# Patient Record
Sex: FEMALE | Race: BLACK OR AFRICAN AMERICAN | Employment: UNEMPLOYED | URBAN - METROPOLITAN AREA
[De-identification: names, ages, dates, MRNs, and addresses within clinical notes are randomized per-mention and may not be internally consistent; named-entity substitution may affect disease eponyms.]

---

## 2021-12-07 PROBLEM — R11.0 NAUSEA: Status: ACTIVE | Noted: 2021-12-07

## 2021-12-07 PROBLEM — E86.0 DEHYDRATION: Status: ACTIVE | Noted: 2021-12-07

## 2021-12-07 PROBLEM — F41.1 GENERALIZED ANXIETY DISORDER: Status: ACTIVE | Noted: 2021-12-07

## 2021-12-07 PROBLEM — I10 ESSENTIAL HYPERTENSION: Status: ACTIVE | Noted: 2021-12-07

## 2021-12-09 PROBLEM — K04.7 DENTAL ABSCESS: Status: ACTIVE | Noted: 2021-12-09

## 2021-12-09 PROBLEM — I10 ASYMPTOMATIC HYPERTENSION: Status: RESOLVED | Noted: 2021-12-07 | Resolved: 2021-12-09

## 2021-12-09 PROBLEM — F41.9 ANXIETY: Status: ACTIVE | Noted: 2021-12-09

## 2021-12-09 PROBLEM — I10 PRIMARY HYPERTENSION: Status: ACTIVE | Noted: 2021-12-09

## 2021-12-09 PROBLEM — M06.9 RHEUMATOID ARTHRITIS (HCC): Status: RESOLVED | Noted: 2021-12-09 | Resolved: 2021-12-09

## 2021-12-09 PROBLEM — F41.1 GENERALIZED ANXIETY DISORDER: Status: RESOLVED | Noted: 2021-12-07 | Resolved: 2021-12-09

## 2021-12-09 PROBLEM — M79.10 MYALGIA: Status: ACTIVE | Noted: 2021-12-09

## 2021-12-09 PROBLEM — I10 ACCELERATED HYPERTENSION: Status: ACTIVE | Noted: 2021-12-09

## 2021-12-09 PROBLEM — K02.9 PAIN DUE TO DENTAL CARIES: Status: RESOLVED | Noted: 2021-12-09 | Resolved: 2021-12-09

## 2021-12-09 PROBLEM — R55 SYNCOPE: Status: RESOLVED | Noted: 2021-12-09 | Resolved: 2021-12-09

## 2021-12-09 PROBLEM — K08.89 PAIN, DENTAL: Status: ACTIVE | Noted: 2021-12-09

## 2021-12-09 PROBLEM — K50.90 CROHN'S DISEASE (HCC): Status: ACTIVE | Noted: 2021-12-09

## 2021-12-09 PROBLEM — S99.911A RIGHT ANKLE INJURY: Status: ACTIVE | Noted: 2021-12-09

## 2021-12-09 PROBLEM — S89.90XA KNEE INJURIES: Status: ACTIVE | Noted: 2021-12-09

## 2021-12-09 PROBLEM — M32.9 EXACERBATION OF SYSTEMIC LUPUS (HCC): Status: ACTIVE | Noted: 2021-12-09

## 2021-12-09 PROBLEM — K08.89 PAIN, DENTAL: Status: RESOLVED | Noted: 2021-12-09 | Resolved: 2021-12-09

## 2021-12-09 PROBLEM — R10.9 ABDOMINAL PAIN: Status: RESOLVED | Noted: 2021-12-09 | Resolved: 2021-12-09

## 2021-12-09 PROBLEM — R11.0 NAUSEA: Status: RESOLVED | Noted: 2021-12-07 | Resolved: 2021-12-09

## 2021-12-09 PROBLEM — F32.A DEPRESSIVE DISORDER: Status: ACTIVE | Noted: 2021-12-09

## 2021-12-09 PROBLEM — M06.9 RHEUMATOID ARTHRITIS (HCC): Status: ACTIVE | Noted: 2021-12-09

## 2021-12-09 PROBLEM — R10.9 ABDOMINAL PAIN: Status: ACTIVE | Noted: 2021-12-09

## 2021-12-09 PROBLEM — E86.0 DEHYDRATION: Status: RESOLVED | Noted: 2021-12-07 | Resolved: 2021-12-09

## 2021-12-09 PROBLEM — R58 ECCHYMOSIS: Status: ACTIVE | Noted: 2021-12-09

## 2021-12-09 PROBLEM — I10 ASYMPTOMATIC HYPERTENSION: Status: ACTIVE | Noted: 2021-12-07

## 2021-12-09 PROBLEM — M25.571 RIGHT ANKLE PAIN: Status: ACTIVE | Noted: 2021-12-09

## 2021-12-09 PROBLEM — F32.A DEPRESSIVE DISORDER: Status: RESOLVED | Noted: 2021-12-09 | Resolved: 2021-12-09

## 2021-12-09 PROBLEM — R55 SYNCOPE: Status: ACTIVE | Noted: 2021-12-09

## 2021-12-09 PROBLEM — M79.10 MYALGIA: Status: RESOLVED | Noted: 2021-12-09 | Resolved: 2021-12-09

## 2021-12-09 PROBLEM — M32.9 EXACERBATION OF SYSTEMIC LUPUS (HCC): Status: RESOLVED | Noted: 2021-12-09 | Resolved: 2021-12-09

## 2021-12-09 PROBLEM — S99.911A RIGHT ANKLE INJURY: Status: RESOLVED | Noted: 2021-12-09 | Resolved: 2021-12-09

## 2021-12-09 PROBLEM — K04.7 DENTAL ABSCESS: Status: RESOLVED | Noted: 2021-12-09 | Resolved: 2021-12-09

## 2021-12-09 PROBLEM — R58 ECCHYMOSIS: Status: RESOLVED | Noted: 2021-12-09 | Resolved: 2021-12-09

## 2021-12-09 PROBLEM — M25.571 RIGHT ANKLE PAIN: Status: RESOLVED | Noted: 2021-12-09 | Resolved: 2021-12-09

## 2021-12-09 PROBLEM — S89.90XA KNEE INJURIES: Status: RESOLVED | Noted: 2021-12-09 | Resolved: 2021-12-09

## 2021-12-09 PROBLEM — K02.9 PAIN DUE TO DENTAL CARIES: Status: ACTIVE | Noted: 2021-12-09

## 2021-12-09 PROBLEM — I10 ACCELERATED HYPERTENSION: Status: RESOLVED | Noted: 2021-12-09 | Resolved: 2021-12-09

## 2021-12-16 ENCOUNTER — TELEMEDICINE (OUTPATIENT)
Dept: TELEHEALTH | Age: 36
End: 2021-12-16
Payer: MEDICAID

## 2021-12-16 DIAGNOSIS — Z02.9 ADMINISTRATIVE ENCOUNTER: Primary | ICD-10-CM

## 2021-12-16 PROCEDURE — 99499 UNLISTED E&M SERVICE: CPT | Performed by: NURSE PRACTITIONER

## 2021-12-16 NOTE — PROGRESS NOTES
Well nourished 42 yo female with complaint of flair of carpel tunnel syndrome.   Relates that she was prescribed a \"U\" medicine in the past.  After discussion it was determined to be Ultram. Inform patient that this is a narcotic and beyond the scope of p

## 2021-12-16 NOTE — PROGRESS NOTES
Well nourished 42 yo female with complaint of flair of carpel tunnel syndrome.   She relates that she has a flair in the past and treated with Ultram.  Instructed that this practitioner can not prescribe this medication (narcotic) and that that may be presc

## 2022-01-19 ENCOUNTER — TELEMEDICINE (OUTPATIENT)
Dept: TELEHEALTH | Age: 37
End: 2022-01-19
Payer: MEDICAID

## 2022-01-19 ENCOUNTER — TELEMEDICINE (OUTPATIENT)
Dept: TELEHEALTH | Age: 37
End: 2022-01-19

## 2022-01-19 DIAGNOSIS — Z02.9 ADMINISTRATIVE ENCOUNTER: Primary | ICD-10-CM

## 2022-01-19 PROCEDURE — 99499 UNLISTED E&M SERVICE: CPT | Performed by: NURSE PRACTITIONER

## 2022-01-19 NOTE — PROGRESS NOTES
Patient elected a video visit. She relates that her \"carpel tunnel syndrome \" pain has flared and request pain medication. Informed that this venue can not prescribe opioid medication of any kind.   Instructed to seek assistance with an Immediate Care o

## 2022-01-19 NOTE — PROGRESS NOTES
Review of medical record revealed patient sought care at [facility] [place] in [place].

## 2022-01-20 ENCOUNTER — TELEMEDICINE (OUTPATIENT)
Dept: TELEHEALTH | Age: 37
End: 2022-01-20

## 2022-01-20 DIAGNOSIS — Z02.9 ADMINISTRATIVE ENCOUNTER: Primary | ICD-10-CM

## 2022-01-20 RX ORDER — HYDROXYZINE HYDROCHLORIDE 25 MG/1
TABLET, FILM COATED ORAL
COMMUNITY
Start: 2020-08-31

## 2022-01-20 RX ORDER — ACETAMINOPHEN AND CODEINE PHOSPHATE 300; 30 MG/1; MG/1
TABLET ORAL
COMMUNITY
Start: 2020-09-20 | End: 2022-01-20

## 2022-01-20 RX ORDER — ALPRAZOLAM 0.5 MG/1
TABLET ORAL
COMMUNITY
Start: 2021-10-08

## 2022-01-20 RX ORDER — CHLORHEXIDINE GLUCONATE 0.12 MG/ML
1 RINSE ORAL 2 TIMES DAILY
COMMUNITY
Start: 2022-01-19

## 2022-01-20 RX ORDER — AMLODIPINE BESYLATE 10 MG/1
TABLET ORAL
COMMUNITY
Start: 2021-12-01

## 2022-01-20 RX ORDER — HYDROCODONE BITARTRATE AND ACETAMINOPHEN 5; 325 MG/1; MG/1
TABLET ORAL
COMMUNITY
Start: 2021-06-27 | End: 2022-01-20

## 2022-01-20 RX ORDER — DICYCLOMINE HCL 20 MG
1 TABLET ORAL 2 TIMES DAILY
COMMUNITY
Start: 2021-12-12

## 2022-01-20 RX ORDER — PENICILLIN V POTASSIUM 500 MG/1
500 TABLET ORAL 4 TIMES DAILY
COMMUNITY

## 2022-01-20 RX ORDER — TRAZODONE HYDROCHLORIDE 100 MG/1
TABLET ORAL
COMMUNITY
Start: 2021-12-01

## 2022-01-20 RX ORDER — PROPRANOLOL HYDROCHLORIDE 20 MG/1
1 TABLET ORAL 2 TIMES DAILY
COMMUNITY
Start: 2021-12-01

## 2022-01-20 RX ORDER — HYDRALAZINE HYDROCHLORIDE 50 MG/1
1 TABLET, FILM COATED ORAL 3 TIMES DAILY
COMMUNITY
Start: 2020-09-16

## 2022-01-20 RX ORDER — LORAZEPAM 0.5 MG/1
0.25 TABLET ORAL
COMMUNITY
Start: 2020-09-20

## 2022-01-20 NOTE — PROGRESS NOTES
Patient initiated Video Visit yesterday for pain. Reports still having significant pain today. Went to [facility] ED and was dx with severe dental abscess and started on PenVK, mouth rinse, and is taking acetaminophen. Still having significant pain.      A

## 2022-03-11 ENCOUNTER — TELEMEDICINE (OUTPATIENT)
Dept: TELEHEALTH | Age: 37
End: 2022-03-11

## 2022-03-11 DIAGNOSIS — F41.9 ANXIETY: ICD-10-CM

## 2022-03-11 DIAGNOSIS — K08.89 TOOTH PAIN: Primary | ICD-10-CM

## 2022-03-11 PROCEDURE — 99212 OFFICE O/P EST SF 10 MIN: CPT | Performed by: PHYSICIAN ASSISTANT

## 2022-03-19 ENCOUNTER — TELEMEDICINE (OUTPATIENT)
Dept: TELEHEALTH | Age: 37
End: 2022-03-19

## 2022-03-19 DIAGNOSIS — Z02.9 ADMINISTRATIVE ENCOUNTER: Primary | ICD-10-CM

## 2022-03-21 ENCOUNTER — TELEMEDICINE (OUTPATIENT)
Dept: TELEHEALTH | Age: 37
End: 2022-03-21

## 2022-03-21 DIAGNOSIS — Z02.9 ENCOUNTERS FOR ADMINISTRATIVE PURPOSE: ICD-10-CM

## 2022-03-21 DIAGNOSIS — R07.9 CHEST PAIN, UNSPECIFIED TYPE: Primary | ICD-10-CM

## 2022-04-28 ENCOUNTER — TELEMEDICINE (OUTPATIENT)
Dept: TELEHEALTH | Age: 37
End: 2022-04-28

## 2022-04-28 VITALS — HEIGHT: 62 IN | WEIGHT: 190 LBS | BODY MASS INDEX: 34.96 KG/M2

## 2022-04-28 DIAGNOSIS — L28.2 PRURITIC RASH: ICD-10-CM

## 2022-04-28 DIAGNOSIS — L29.9 ITCHING: Primary | ICD-10-CM

## 2022-04-28 PROCEDURE — 99212 OFFICE O/P EST SF 10 MIN: CPT | Performed by: NURSE PRACTITIONER

## 2022-04-28 RX ORDER — HYDROXYZINE HYDROCHLORIDE 25 MG/1
25 TABLET, FILM COATED ORAL EVERY 8 HOURS PRN
Qty: 15 TABLET | Refills: 0 | Status: SHIPPED | OUTPATIENT
Start: 2022-04-28 | End: 2022-05-03

## 2022-04-28 RX ORDER — LOSARTAN POTASSIUM 50 MG/1
100 TABLET ORAL
COMMUNITY
Start: 2022-03-22

## 2022-04-28 RX ORDER — PREDNISONE 20 MG/1
40 TABLET ORAL DAILY
Qty: 10 TABLET | Refills: 0 | Status: SHIPPED | OUTPATIENT
Start: 2022-04-28 | End: 2022-05-03

## 2022-05-04 ENCOUNTER — TELEMEDICINE (OUTPATIENT)
Dept: TELEHEALTH | Age: 37
End: 2022-05-04

## 2022-05-04 DIAGNOSIS — F41.9 ANXIETY: ICD-10-CM

## 2022-05-04 DIAGNOSIS — M54.42 ACUTE MIDLINE LOW BACK PAIN WITH LEFT-SIDED SCIATICA: Primary | ICD-10-CM

## 2022-05-04 DIAGNOSIS — S06.0X0D CONCUSSION WITHOUT LOSS OF CONSCIOUSNESS, SUBSEQUENT ENCOUNTER: ICD-10-CM

## 2022-05-04 PROCEDURE — 99214 OFFICE O/P EST MOD 30 MIN: CPT | Performed by: NURSE PRACTITIONER

## 2022-05-04 RX ORDER — TRAZODONE HYDROCHLORIDE 100 MG/1
100 TABLET ORAL NIGHTLY
Qty: 30 TABLET | Refills: 0 | Status: SHIPPED | OUTPATIENT
Start: 2022-05-04

## 2022-05-04 RX ORDER — METHYLPREDNISOLONE 4 MG/1
TABLET ORAL
Qty: 1 EACH | Refills: 0 | Status: SHIPPED | OUTPATIENT
Start: 2022-05-04

## 2022-05-13 ENCOUNTER — TELEMEDICINE (OUTPATIENT)
Dept: TELEHEALTH | Age: 37
End: 2022-05-13

## 2022-05-13 DIAGNOSIS — L50.9 URTICARIA: Primary | ICD-10-CM

## 2022-05-13 PROCEDURE — 99213 OFFICE O/P EST LOW 20 MIN: CPT | Performed by: NURSE PRACTITIONER

## 2022-05-13 RX ORDER — PREDNISONE 20 MG/1
TABLET ORAL
Qty: 10 TABLET | Refills: 0 | Status: SHIPPED | OUTPATIENT
Start: 2022-05-13

## 2022-05-13 RX ORDER — HYDROXYZINE 50 MG/1
TABLET, FILM COATED ORAL
Qty: 10 TABLET | Refills: 0 | Status: SHIPPED | OUTPATIENT
Start: 2022-05-13

## 2022-06-03 ENCOUNTER — TELEMEDICINE (OUTPATIENT)
Dept: TELEHEALTH | Age: 37
End: 2022-06-03

## 2022-06-03 DIAGNOSIS — Z02.9 ADMINISTRATIVE ENCOUNTER: Primary | ICD-10-CM

## 2022-06-03 NOTE — PROGRESS NOTES
Patient has had recurrent telehealth visits for urticaria and itching that only happens when she gets home. Think she is allergic to something in her house. No rash at this time. No breathing difficulty or swelling to face or angioedema noted on video visit. Discussed that this is not appropriate management of condition and needs to be seen in person by PCP for chronic urticaria. Referred to health. org to establish PCP if unable to make prompt appointment with Dr. Roro Hazel. Video visit cancelled, no charge.

## 2023-04-15 ENCOUNTER — HOSPITAL ENCOUNTER (EMERGENCY)
Age: 38
Discharge: HOME | End: 2023-04-15
Attending: EMERGENCY MEDICINE
Payer: MEDICAID

## 2023-04-15 VITALS
OXYGEN SATURATION: 98 % | WEIGHT: 180 LBS | SYSTOLIC BLOOD PRESSURE: 171 MMHG | HEIGHT: 62 IN | BODY MASS INDEX: 33.13 KG/M2 | DIASTOLIC BLOOD PRESSURE: 125 MMHG | HEART RATE: 97 BPM | RESPIRATION RATE: 18 BRPM | TEMPERATURE: 98 F

## 2023-04-15 DIAGNOSIS — Z76.0 ENCOUNTER FOR MEDICATION REFILL: ICD-10-CM

## 2023-04-15 DIAGNOSIS — R03.0 ELEVATED BLOOD PRESSURE READING: ICD-10-CM

## 2023-04-15 DIAGNOSIS — K08.89 PAIN, DENTAL: Primary | ICD-10-CM

## 2023-04-15 PROCEDURE — 99285 EMERGENCY DEPT VISIT HI MDM: CPT

## 2023-04-15 PROCEDURE — 64400 NJX AA&/STRD TRIGEMINAL NRV: CPT

## 2023-04-15 PROCEDURE — 99283 EMERGENCY DEPT VISIT LOW MDM: CPT

## 2023-04-15 PROCEDURE — 99284 EMERGENCY DEPT VISIT MOD MDM: CPT

## 2023-04-15 RX ORDER — ACETAMINOPHEN AND CODEINE PHOSPHATE 300; 30 MG/1; MG/1
1-2 TABLET ORAL EVERY 6 HOURS PRN
Qty: 10 TABLET | Refills: 0 | Status: SHIPPED | OUTPATIENT
Start: 2023-04-15 | End: 2023-04-20

## 2023-04-15 RX ORDER — GABAPENTIN 100 MG/1
100 CAPSULE ORAL 3 TIMES DAILY
Qty: 21 CAPSULE | Refills: 0 | Status: SHIPPED | OUTPATIENT
Start: 2023-04-15

## 2023-04-15 RX ORDER — ACETAMINOPHEN AND CODEINE PHOSPHATE 300; 30 MG/1; MG/1
1-2 TABLET ORAL EVERY 6 HOURS PRN
Qty: 10 TABLET | Refills: 0 | Status: SHIPPED | OUTPATIENT
Start: 2023-04-15 | End: 2023-04-15

## 2023-04-15 RX ORDER — HYDRALAZINE HYDROCHLORIDE 50 MG/1
50 TABLET, FILM COATED ORAL 3 TIMES DAILY
Qty: 30 TABLET | Refills: 0 | Status: SHIPPED | OUTPATIENT
Start: 2023-04-15

## 2023-04-15 RX ORDER — BUPIVACAINE HYDROCHLORIDE 5 MG/ML
10 INJECTION, SOLUTION EPIDURAL; INTRACAUDAL ONCE
Status: COMPLETED | OUTPATIENT
Start: 2023-04-15 | End: 2023-04-15

## 2023-04-15 RX ORDER — AMLODIPINE BESYLATE 10 MG/1
10 TABLET ORAL DAILY
Qty: 30 TABLET | Refills: 0 | Status: SHIPPED | OUTPATIENT
Start: 2023-04-15

## 2023-04-15 NOTE — DISCHARGE INSTRUCTIONS
Please follow-up with your primary care physician 1-2 days return to the ER if your symptoms worsen progress or if you have any further concerns. Please take Motrin 600 mg every 6 hours as needed for pain control. Please touch your primary care physician about your elevated blood pressure. It was elevated to 197/125 here which may be related to how much pain urine but it should be rechecked within 1 week.

## 2023-06-21 ENCOUNTER — HOSPITAL ENCOUNTER (EMERGENCY)
Age: 38
Discharge: HOME | End: 2023-06-21
Attending: EMERGENCY MEDICINE
Payer: MEDICAID

## 2023-06-21 VITALS
BODY MASS INDEX: 33.13 KG/M2 | SYSTOLIC BLOOD PRESSURE: 151 MMHG | WEIGHT: 180 LBS | HEART RATE: 85 BPM | RESPIRATION RATE: 18 BRPM | OXYGEN SATURATION: 99 % | DIASTOLIC BLOOD PRESSURE: 92 MMHG | TEMPERATURE: 98 F | HEIGHT: 62 IN

## 2023-06-21 DIAGNOSIS — K08.89 PAIN, DENTAL: Primary | ICD-10-CM

## 2023-06-21 DIAGNOSIS — F41.8 SITUATIONAL ANXIETY: ICD-10-CM

## 2023-06-21 PROCEDURE — 99283 EMERGENCY DEPT VISIT LOW MDM: CPT

## 2023-06-21 PROCEDURE — 99284 EMERGENCY DEPT VISIT MOD MDM: CPT

## 2023-06-21 RX ORDER — AMOXICILLIN 875 MG/1
875 TABLET, COATED ORAL 2 TIMES DAILY
Qty: 28 TABLET | Refills: 0 | Status: SHIPPED | OUTPATIENT
Start: 2023-06-21 | End: 2023-07-05

## 2023-06-21 RX ORDER — DIAZEPAM 5 MG/1
5 TABLET ORAL AS NEEDED
Qty: 6 TABLET | Refills: 0 | Status: SHIPPED | OUTPATIENT
Start: 2023-06-21

## 2023-06-21 RX ORDER — TRAMADOL HYDROCHLORIDE 50 MG/1
50 TABLET ORAL EVERY 8 HOURS PRN
Qty: 10 TABLET | Refills: 0 | Status: SHIPPED | OUTPATIENT
Start: 2023-06-21 | End: 2023-06-26

## 2023-06-21 RX ORDER — AMOXICILLIN 875 MG/1
875 TABLET, COATED ORAL ONCE
Status: COMPLETED | OUTPATIENT
Start: 2023-06-21 | End: 2023-06-21

## 2023-06-21 NOTE — ED INITIAL ASSESSMENT (HPI)
C/o dental pain to top left on and off for a couple of months- unable to get into dentist due to insurance

## 2023-06-21 NOTE — DISCHARGE INSTRUCTIONS
START THE ANTIBIOTICS  START THE SERTRALINE  FOLLOW WITH DENTISTRY- [facility] Artemio VENTURA

## 2023-07-30 ENCOUNTER — HOSPITAL ENCOUNTER (EMERGENCY)
Age: 38
Discharge: HOME | End: 2023-07-30
Attending: EMERGENCY MEDICINE
Payer: MEDICAID

## 2023-07-30 VITALS
DIASTOLIC BLOOD PRESSURE: 106 MMHG | RESPIRATION RATE: 16 BRPM | WEIGHT: 179.88 LBS | OXYGEN SATURATION: 98 % | BODY MASS INDEX: 33 KG/M2 | TEMPERATURE: 98 F | HEART RATE: 106 BPM | SYSTOLIC BLOOD PRESSURE: 176 MMHG

## 2023-07-30 DIAGNOSIS — K02.9 DENTAL CARIES: Primary | ICD-10-CM

## 2023-07-30 LAB
ATRIAL RATE: 100 BPM
P AXIS: 65 DEGREES
P-R INTERVAL: 146 MS
Q-T INTERVAL: 368 MS
QRS DURATION: 76 MS
QTC CALCULATION (BEZET): 474 MS
R AXIS: 60 DEGREES
T AXIS: 74 DEGREES
VENTRICULAR RATE: 100 BPM

## 2023-07-30 PROCEDURE — 93005 ELECTROCARDIOGRAM TRACING: CPT

## 2023-07-30 PROCEDURE — 93010 ELECTROCARDIOGRAM REPORT: CPT

## 2023-07-30 PROCEDURE — 99284 EMERGENCY DEPT VISIT MOD MDM: CPT

## 2023-07-30 PROCEDURE — 99283 EMERGENCY DEPT VISIT LOW MDM: CPT

## 2023-07-30 RX ORDER — PENICILLIN V POTASSIUM 500 MG/1
500 TABLET ORAL 2 TIMES DAILY
Qty: 14 TABLET | Refills: 0 | Status: SHIPPED | OUTPATIENT
Start: 2023-07-30 | End: 2023-08-06

## 2023-07-30 RX ORDER — HYDROCODONE BITARTRATE AND ACETAMINOPHEN 5; 325 MG/1; MG/1
1-2 TABLET ORAL EVERY 4 HOURS PRN
Qty: 12 TABLET | Refills: 0 | Status: SHIPPED | OUTPATIENT
Start: 2023-07-30

## 2023-07-30 NOTE — ED INITIAL ASSESSMENT (HPI)
Intermittent headaches over last 4-5 mos with radiation to left jaw. Current episode started yesterday. C/o left upper tooth pain. States she thinks that is the source of her pains.

## 2023-10-11 PROCEDURE — 93010 ELECTROCARDIOGRAM REPORT: CPT | Performed by: INTERNAL MEDICINE

## 2023-10-12 PROCEDURE — 93010 ELECTROCARDIOGRAM REPORT: CPT | Performed by: INTERNAL MEDICINE

## 2023-10-12 PROCEDURE — 93306 TTE W/DOPPLER COMPLETE: CPT | Performed by: INTERNAL MEDICINE

## 2023-12-13 ENCOUNTER — TELEMEDICINE (OUTPATIENT)
Dept: FAMILY MEDICINE CLINIC | Facility: CLINIC | Age: 38
End: 2023-12-13
Payer: MEDICAID

## 2023-12-13 DIAGNOSIS — L50.9 URTICARIA: ICD-10-CM

## 2023-12-13 DIAGNOSIS — F51.01 PRIMARY INSOMNIA: ICD-10-CM

## 2023-12-13 DIAGNOSIS — F41.9 ANXIETY: ICD-10-CM

## 2023-12-13 DIAGNOSIS — K21.9 GASTROESOPHAGEAL REFLUX DISEASE, UNSPECIFIED WHETHER ESOPHAGITIS PRESENT: ICD-10-CM

## 2023-12-13 DIAGNOSIS — I10 PRIMARY HYPERTENSION: Primary | ICD-10-CM

## 2023-12-13 PROCEDURE — 99214 OFFICE O/P EST MOD 30 MIN: CPT | Performed by: NURSE PRACTITIONER

## 2023-12-13 RX ORDER — LOSARTAN POTASSIUM 50 MG/1
100 TABLET ORAL DAILY
Qty: 30 TABLET | Refills: 1 | Status: SHIPPED | OUTPATIENT
Start: 2023-12-13

## 2023-12-13 RX ORDER — HYDROXYZINE 50 MG/1
TABLET, FILM COATED ORAL
Qty: 30 TABLET | Refills: 0 | Status: SHIPPED | OUTPATIENT
Start: 2023-12-13

## 2023-12-13 RX ORDER — AMLODIPINE BESYLATE 10 MG/1
10 TABLET ORAL DAILY
Qty: 30 TABLET | Refills: 1 | Status: SHIPPED | OUTPATIENT
Start: 2023-12-13

## 2023-12-13 RX ORDER — TRAZODONE HYDROCHLORIDE 50 MG/1
100 TABLET ORAL NIGHTLY
Qty: 30 TABLET | Refills: 1 | Status: SHIPPED | OUTPATIENT
Start: 2023-12-13

## 2023-12-13 NOTE — PROGRESS NOTES
Due to the real risk of possible exposure to Coronavirus (CoV-2, COVID-19) in the office/medical building and recommendations for social distancing (key to mitigation/limiting the spread of the virus) a Virtual or Telemedicine visit over the phone was performed as below. Patient has consented to the Virtual/Telephone Check-In service and expresses understanding and accepts financial responsibility for any deductible, co-insurance and/or co-pays associated with this service. Telehealth outside of [address] Verbal Consent   I conducted a telehealth visit with Caffie Seip today, 12/13/23, which was completed using two-way, real-time interactive audio and video communication. This has been done in good joselyn to provide continuity of care in the best interest of the provider-patient relationship, due to the COVID -19 public health crisis/national emergency where restrictions of face-to-face office visits are ongoing. Every conscious effort was taken to allow for sufficient and adequate time to complete the visit. The patient was made aware of the limitations of the telehealth visit, including treatment limitations as no physical exam could be performed. The patient was advised to call 911 or to go to the ER in case there was an emergency. The patient was also advised of the potential privacy & security concerns related to the telehealth platform. The patient was made aware of where to find [facility] notice of privacy practices, telehealth consent form and other related consent forms and documents. which are located on the [facility] website. The patient verbally agreed to telehealth consent form, related consents and the risks discussed. Lastly, the patient confirmed that they were in [place]. Included in this visit, time may have been spent reviewing labs, medications, radiology tests and decision making.  Appropriate medical decision-making and tests are ordered as detailed in the plan of care above.  Coding/billing information is submitted for this visit based on complexity of care and/or time spent for the visit. HPI:  Chief Complaint   Patient presents with    Follow - Up       Mary Jim is a 34year old female who calls for complaints of:    Several concerns. Reports itching that occurs at work, she thinks there is a lot of dust there. Itching is generalized. Denies rash. Uses Benadryl cream PRN but this does not fully resolve it. No itching on days she does not work. Used to use hydroxyzine as needed and this helped. Acid reflux: Symptoms started 2 years ago, triggered by certain foods. Sometimes causes nausea, belching. Has tried omeprazole OTC which helps at times. Appetite is lower lately due to this. Denies v/d/c, abdominal pain, blood in stool. Anxiety: Used to take sertraline and trazodone but has been out for quite awhile. Also used to take hydroxyzine and this helped a lot. Denies SHIP. Also having trouble sleeping. ROS:  GEN: Denies fever, chills, fatigue. CARDIOVASCULAR: Denies chest pain, dyspnea. RESPIRATORY: Denies cough, dyspnea. GI: Denies n/v/d/c, appetite normal.  NEURO: Denies headaches, dizziness. Physical Exam:  GEN:  Patient is alert, awake and oriented, well developed, well nourished. LUNGS: Patient speaks clearly in full sentences without dyspnea, no cough while on video visit. PSYCH: Appropriate mood and affect. Allergies   Allergen Reactions    Ketorolac Tromethamine HIVES    Metoclopramide SHORTNESS OF BREATH    Naproxen HIVES     Current Outpatient Medications   Medication Sig Dispense Refill    amLODIPine 10 MG Oral Tab Take 1 tablet (10 mg total) by mouth daily. 30 tablet 0    losartan 50 MG Oral Tab 2 tablets (100 mg total).        Past Medical History:   Diagnosis Date    Anxiety     Essential hypertension      Past Surgical History:   Procedure Laterality Date    APPENDECTOMY      REMOVAL GALLBLADDER      TOTAL ABDOM HYSTERECTOMY ASSESSMENT AND PLAN:   1. Patient is a 34year old female who calls for: Follow-up/refills    Additional Assessment and Plan:  1. Primary hypertension  -Reports good compliance with medications. Will refill x 1 month, needs to be seen in-person within the next month. She verbalized understanding. 2. Anxiety  -Will restart sertraline daily. Side effects and how to take this medicine reviewed with patient. See me 4 weeks. 3. Primary insomnia  -Will restart trazodone, starting at lower dose and can increase later if needed. Side effects and how to take reviewed. Follow-up within 1 month. 4. Gastroesophageal reflux disease, unspecified whether esophagitis present  -Recommended omeprazole 20mg QAM. Diet/lifestyle changes reviewed with patient. Should take the Prilosec at least 2 weeks. May use Pepcid PRN for breakthrough symptoms. See me within 4 weeks. 5. Urticaria  -Recommended daily Claritin or Zyrtec over-the-counter. Should shower as soon as she gets home from work and launder her clothes. May use hydroxyzine PRN for breakthrough itching. Discussed not to take this before driving or with other sedating meds. Follow up with me 4 weeks    Call and/or go to the ER if worsening symptoms including, but not limited to: respiratory distress, shortness of breath and  wheezing, worsening fever, cough and mental status. The patient (or patient's parent if <19 y/o) indicates understanding of the above recommendations and agrees to the above plan. No orders of the defined types were placed in this encounter.       Meds & Refills for this Visit:  Requested Prescriptions      No prescriptions requested or ordered in this encounter       Imaging & Consults:  None    ZENY Moran     Time spent during encounter: 15 minutes

## 2024-01-26 ENCOUNTER — TELEMEDICINE (OUTPATIENT)
Dept: TELEHEALTH | Age: 39
End: 2024-01-26
Payer: MEDICAID

## 2024-01-26 ENCOUNTER — E-VISIT (OUTPATIENT)
Dept: TELEHEALTH | Age: 39
End: 2024-01-26
Payer: MEDICAID

## 2024-01-26 DIAGNOSIS — L29.9 PRURITUS: Primary | ICD-10-CM

## 2024-01-26 DIAGNOSIS — Z02.9 ADMINISTRATIVE ENCOUNTER: Primary | ICD-10-CM

## 2024-01-26 RX ORDER — HYDROXYZINE 50 MG/1
50 TABLET, FILM COATED ORAL NIGHTLY PRN
Qty: 20 TABLET | Refills: 0 | Status: SHIPPED | OUTPATIENT
Start: 2024-01-26

## 2024-01-26 NOTE — PROGRESS NOTES
Mago Tovar is a 38 year old female.  HPI:   See answers to questions above.     Current Outpatient Medications   Medication Sig Dispense Refill    hydrOXYzine 50 MG Oral Tab Take 1 tablet (50 mg total) by mouth nightly as needed for Itching. 20 tablet 0    sertraline 50 MG Oral Tab Take 1 tablet (50 mg total) by mouth daily. 30 tablet 1    traZODone 50 MG Oral Tab Take 2 tablets (100 mg total) by mouth nightly. 30 tablet 1    losartan 50 MG Oral Tab Take 2 tablets (100 mg total) by mouth daily. 30 tablet 1    amLODIPine 10 MG Oral Tab Take 1 tablet (10 mg total) by mouth daily. 30 tablet 1      Past Medical History:   Diagnosis Date    Anxiety     Essential hypertension       Past Surgical History:   Procedure Laterality Date    APPENDECTOMY      REMOVAL GALLBLADDER      TOTAL ABDOM HYSTERECTOMY        No family history on file.   Social History:  Social History     Socioeconomic History    Marital status: Single   Tobacco Use    Smoking status: Never   Vaping Use    Vaping Use: Never used   Substance and Sexual Activity    Alcohol use: Yes     Comment: social    Drug use: Never         ASSESSMENT AND PLAN:     Encounter Diagnosis   Name Primary?    Pruritus Yes       Ongoing issue with pruritus - unclear etiology. States trigger is new cat at home and work dust. Seen by PCP for this and Rx'ed hydroxyzine. Works well for pt but ran out. Has tried Claritin and Zyrtec with no relief. Advised one time refill but will need to be re-evaluated by PCP or specialist for further management to determine underlying cause.     Meds & Refills for this Visit:  Requested Prescriptions     Signed Prescriptions Disp Refills    hydrOXYzine 50 MG Oral Tab 20 tablet 0     Sig: Take 1 tablet (50 mg total) by mouth nightly as needed for Itching.       Duration of  the service:  10 minutes    Patient advised to follow up with PCP if no improvement or worsening of symptoms  Refer to [employer] message for specific patient  instructions

## 2024-02-19 ENCOUNTER — HOSPITAL ENCOUNTER (INPATIENT)
Facility: HOSPITAL | Age: 39
LOS: 4 days | Discharge: HOME | End: 2024-02-23
Attending: STUDENT IN AN ORGANIZED HEALTH CARE EDUCATION/TRAINING PROGRAM | Admitting: HOSPITALIST
Payer: MEDICAID

## 2024-02-19 ENCOUNTER — APPOINTMENT (OUTPATIENT)
Dept: GENERAL RADIOLOGY | Facility: HOSPITAL | Age: 39
End: 2024-02-19
Attending: EMERGENCY MEDICINE
Payer: MEDICAID

## 2024-02-19 ENCOUNTER — APPOINTMENT (OUTPATIENT)
Dept: CT IMAGING | Facility: HOSPITAL | Age: 39
End: 2024-02-19
Attending: HOSPITALIST
Payer: MEDICAID

## 2024-02-19 ENCOUNTER — ANESTHESIA EVENT (OUTPATIENT)
Dept: ENDOSCOPY | Facility: HOSPITAL | Age: 39
End: 2024-02-19
Payer: MEDICAID

## 2024-02-19 ENCOUNTER — ANESTHESIA (OUTPATIENT)
Dept: ENDOSCOPY | Facility: HOSPITAL | Age: 39
End: 2024-02-19
Payer: MEDICAID

## 2024-02-19 DIAGNOSIS — F41.0 PANIC ATTACK: ICD-10-CM

## 2024-02-19 DIAGNOSIS — I10 PRIMARY HYPERTENSION: ICD-10-CM

## 2024-02-19 DIAGNOSIS — F41.9 ANXIETY: ICD-10-CM

## 2024-02-19 DIAGNOSIS — T18.128A ESOPHAGEAL OBSTRUCTION DUE TO FOOD IMPACTION: Primary | ICD-10-CM

## 2024-02-19 DIAGNOSIS — R10.9 ABDOMINAL PAIN OF UNKNOWN ETIOLOGY: ICD-10-CM
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
## 2024-02-19 LAB
ALBUMIN SERPL-MCNC: 4.1 G/DL (ref 3.4–5)
ALBUMIN/GLOB SERPL: 0.8 {RATIO} (ref 1–2)
ALP LIVER SERPL-CCNC: 106 U/L
ALT SERPL-CCNC: 19 U/L
ANION GAP SERPL CALC-SCNC: 6 MMOL/L (ref 0–18)
AST SERPL-CCNC: 17 U/L (ref 15–37)
ATRIAL RATE: 127 BPM
BASOPHILS # BLD AUTO: 0.08 X10(3) UL (ref 0–0.2)
BASOPHILS NFR BLD AUTO: 0.8 %
BILIRUB SERPL-MCNC: 0.3 MG/DL (ref 0.1–2)
BUN BLD-MCNC: 13 MG/DL (ref 9–23)
CALCIUM BLD-MCNC: 10.2 MG/DL (ref 8.5–10.1)
CHLORIDE SERPL-SCNC: 106 MMOL/L (ref 98–112)
CO2 SERPL-SCNC: 24 MMOL/L (ref 21–32)
CREAT BLD-MCNC: 0.98 MG/DL
D DIMER PPP FEU-MCNC: 0.45 UG/ML FEU (ref ?–0.5)
EGFRCR SERPLBLD CKD-EPI 2021: 75 ML/MIN/1.73M2 (ref 60–?)
EOSINOPHIL # BLD AUTO: 0.2 X10(3) UL (ref 0–0.7)
EOSINOPHIL NFR BLD AUTO: 2.1 %
ERYTHROCYTE [DISTWIDTH] IN BLOOD BY AUTOMATED COUNT: 14.3 %
FLUAV + FLUBV RNA SPEC NAA+PROBE: NEGATIVE
FLUAV + FLUBV RNA SPEC NAA+PROBE: NEGATIVE
GLOBULIN PLAS-MCNC: 4.9 G/DL (ref 2.8–4.4)
GLUCOSE BLD-MCNC: 103 MG/DL (ref 70–99)
HCT VFR BLD AUTO: 44.3 %
HGB BLD-MCNC: 14.2 G/DL
IMM GRANULOCYTES # BLD AUTO: 0.02 X10(3) UL (ref 0–1)
IMM GRANULOCYTES NFR BLD: 0.2 %
LYMPHOCYTES # BLD AUTO: 4.1 X10(3) UL (ref 1–4)
LYMPHOCYTES NFR BLD AUTO: 42.6 %
MCH RBC QN AUTO: 26 PG (ref 26–34)
MCHC RBC AUTO-ENTMCNC: 32.1 G/DL (ref 31–37)
MCV RBC AUTO: 81 FL
MONOCYTES # BLD AUTO: 0.47 X10(3) UL (ref 0.1–1)
MONOCYTES NFR BLD AUTO: 4.9 %
NEUTROPHILS # BLD AUTO: 4.75 X10 (3) UL (ref 1.5–7.7)
NEUTROPHILS # BLD AUTO: 4.75 X10(3) UL (ref 1.5–7.7)
NEUTROPHILS NFR BLD AUTO: 49.4 %
OSMOLALITY SERPL CALC.SUM OF ELEC: 282 MOSM/KG (ref 275–295)
P AXIS: 78 DEGREES
P-R INTERVAL: 128 MS
PLATELET # BLD AUTO: 258 10(3)UL (ref 150–450)
POTASSIUM SERPL-SCNC: 3.3 MMOL/L (ref 3.5–5.1)
PROT SERPL-MCNC: 9 G/DL (ref 6.4–8.2)
Q-T INTERVAL: 322 MS
QRS DURATION: 72 MS
QTC CALCULATION (BEZET): 467 MS
R AXIS: 76 DEGREES
RBC # BLD AUTO: 5.47 X10(6)UL
RSV RNA SPEC NAA+PROBE: NEGATIVE
SARS-COV-2 RNA RESP QL NAA+PROBE: NOT DETECTED
SODIUM SERPL-SCNC: 136 MMOL/L (ref 136–145)
T AXIS: 85 DEGREES
TROPONIN I SERPL HS-MCNC: 6 NG/L
TROPONIN I SERPL HS-MCNC: 9 NG/L
TSI SER-ACNC: 1.41 MIU/ML (ref 0.36–3.74)
VENTRICULAR RATE: 127 BPM
WBC # BLD AUTO: 9.6 X10(3) UL (ref 4–11)

## 2024-02-19 PROCEDURE — 71045 X-RAY EXAM CHEST 1 VIEW: CPT | Performed by: EMERGENCY MEDICINE

## 2024-02-19 PROCEDURE — 99223 1ST HOSP IP/OBS HIGH 75: CPT | Performed by: HOSPITALIST

## 2024-02-19 PROCEDURE — 74177 CT ABD & PELVIS W/CONTRAST: CPT | Performed by: HOSPITALIST

## 2024-02-19 PROCEDURE — 0DB68ZX EXCISION OF STOMACH, VIA NATURAL OR ARTIFICIAL OPENING ENDOSCOPIC, DIAGNOSTIC: ICD-10-PCS | Performed by: INTERNAL MEDICINE

## 2024-02-19 PROCEDURE — 0DB58ZX EXCISION OF ESOPHAGUS, VIA NATURAL OR ARTIFICIAL OPENING ENDOSCOPIC, DIAGNOSTIC: ICD-10-PCS | Performed by: INTERNAL MEDICINE

## 2024-02-19 RX ORDER — SODIUM CHLORIDE 9 MG/ML
125 INJECTION, SOLUTION INTRAVENOUS CONTINUOUS
Status: DISCONTINUED | OUTPATIENT
Start: 2024-02-19 | End: 2024-02-19

## 2024-02-19 RX ORDER — POTASSIUM CHLORIDE 14.9 MG/ML
20 INJECTION INTRAVENOUS ONCE
Status: COMPLETED | OUTPATIENT
Start: 2024-02-19 | End: 2024-02-19

## 2024-02-19 RX ORDER — HYDROMORPHONE HYDROCHLORIDE 1 MG/ML
0.4 INJECTION, SOLUTION INTRAMUSCULAR; INTRAVENOUS; SUBCUTANEOUS EVERY 5 MIN PRN
Status: DISCONTINUED | OUTPATIENT
Start: 2024-02-19 | End: 2024-02-19 | Stop reason: HOSPADM

## 2024-02-19 RX ORDER — SENNOSIDES 8.6 MG
17.2 TABLET ORAL NIGHTLY PRN
Status: DISCONTINUED | OUTPATIENT
Start: 2024-02-19 | End: 2024-02-23

## 2024-02-19 RX ORDER — LABETALOL HYDROCHLORIDE 5 MG/ML
5 INJECTION, SOLUTION INTRAVENOUS EVERY 5 MIN PRN
Status: DISCONTINUED | OUTPATIENT
Start: 2024-02-19 | End: 2024-02-19 | Stop reason: HOSPADM

## 2024-02-19 RX ORDER — HALOPERIDOL 5 MG/ML
0.5 INJECTION INTRAMUSCULAR EVERY 6 HOURS PRN
Status: DISCONTINUED | OUTPATIENT
Start: 2024-02-19 | End: 2024-02-23

## 2024-02-19 RX ORDER — SODIUM CHLORIDE 9 MG/ML
INJECTION, SOLUTION INTRAVENOUS CONTINUOUS
Status: DISCONTINUED | OUTPATIENT
Start: 2024-02-19 | End: 2024-02-23

## 2024-02-19 RX ORDER — LIDOCAINE HYDROCHLORIDE 10 MG/ML
INJECTION, SOLUTION EPIDURAL; INFILTRATION; INTRACAUDAL; PERINEURAL AS NEEDED
Status: DISCONTINUED | OUTPATIENT
Start: 2024-02-19 | End: 2024-02-19 | Stop reason: SURG

## 2024-02-19 RX ORDER — ACETAMINOPHEN 500 MG
1000 TABLET ORAL ONCE AS NEEDED
Status: DISCONTINUED | OUTPATIENT
Start: 2024-02-19 | End: 2024-02-19 | Stop reason: HOSPADM

## 2024-02-19 RX ORDER — HEPARIN SODIUM 5000 [USP'U]/ML
5000 INJECTION, SOLUTION INTRAVENOUS; SUBCUTANEOUS EVERY 12 HOURS SCHEDULED
Status: DISCONTINUED | OUTPATIENT
Start: 2024-02-19 | End: 2024-02-23

## 2024-02-19 RX ORDER — ACETAMINOPHEN 500 MG
500 TABLET ORAL EVERY 4 HOURS PRN
Status: DISCONTINUED | OUTPATIENT
Start: 2024-02-19 | End: 2024-02-23

## 2024-02-19 RX ORDER — ECHINACEA PURPUREA EXTRACT 125 MG
1 TABLET ORAL
Status: DISCONTINUED | OUTPATIENT
Start: 2024-02-19 | End: 2024-02-23

## 2024-02-19 RX ORDER — PROCHLORPERAZINE EDISYLATE 5 MG/ML
5 INJECTION INTRAMUSCULAR; INTRAVENOUS EVERY 8 HOURS PRN
Status: DISCONTINUED | OUTPATIENT
Start: 2024-02-19 | End: 2024-02-19

## 2024-02-19 RX ORDER — MEPERIDINE HYDROCHLORIDE 25 MG/ML
12.5 INJECTION INTRAMUSCULAR; INTRAVENOUS; SUBCUTANEOUS AS NEEDED
Status: DISCONTINUED | OUTPATIENT
Start: 2024-02-19 | End: 2024-02-19 | Stop reason: HOSPADM

## 2024-02-19 RX ORDER — ONDANSETRON 2 MG/ML
4 INJECTION INTRAMUSCULAR; INTRAVENOUS EVERY 6 HOURS PRN
Status: DISCONTINUED | OUTPATIENT
Start: 2024-02-19 | End: 2024-02-23

## 2024-02-19 RX ORDER — ONDANSETRON 2 MG/ML
4 INJECTION INTRAMUSCULAR; INTRAVENOUS EVERY 6 HOURS PRN
Status: DISCONTINUED | OUTPATIENT
Start: 2024-02-19 | End: 2024-02-19 | Stop reason: HOSPADM

## 2024-02-19 RX ORDER — MIDAZOLAM HYDROCHLORIDE 1 MG/ML
1 INJECTION INTRAMUSCULAR; INTRAVENOUS EVERY 5 MIN PRN
Status: DISCONTINUED | OUTPATIENT
Start: 2024-02-19 | End: 2024-02-19 | Stop reason: HOSPADM

## 2024-02-19 RX ORDER — HYDROCODONE BITARTRATE AND ACETAMINOPHEN 5; 325 MG/1; MG/1
1 TABLET ORAL ONCE AS NEEDED
Status: DISCONTINUED | OUTPATIENT
Start: 2024-02-19 | End: 2024-02-19 | Stop reason: HOSPADM

## 2024-02-19 RX ORDER — POTASSIUM CHLORIDE 1.5 G/1.58G
40 POWDER, FOR SOLUTION ORAL ONCE
Status: DISCONTINUED | OUTPATIENT
Start: 2024-02-19 | End: 2024-02-19

## 2024-02-19 RX ORDER — QUETIAPINE FUMARATE 25 MG/1
25 TABLET, FILM COATED ORAL NIGHTLY
COMMUNITY
End: 2024-02-23

## 2024-02-19 RX ORDER — HYDROMORPHONE HYDROCHLORIDE 1 MG/ML
0.2 INJECTION, SOLUTION INTRAMUSCULAR; INTRAVENOUS; SUBCUTANEOUS EVERY 5 MIN PRN
Status: DISCONTINUED | OUTPATIENT
Start: 2024-02-19 | End: 2024-02-19 | Stop reason: HOSPADM

## 2024-02-19 RX ORDER — NALOXONE HYDROCHLORIDE 0.4 MG/ML
80 INJECTION, SOLUTION INTRAMUSCULAR; INTRAVENOUS; SUBCUTANEOUS AS NEEDED
Status: DISCONTINUED | OUTPATIENT
Start: 2024-02-19 | End: 2024-02-19 | Stop reason: HOSPADM

## 2024-02-19 RX ORDER — ENEMA 19; 7 G/133ML; G/133ML
1 ENEMA RECTAL ONCE AS NEEDED
Status: DISCONTINUED | OUTPATIENT
Start: 2024-02-19 | End: 2024-02-23

## 2024-02-19 RX ORDER — DIPHENHYDRAMINE HYDROCHLORIDE 50 MG/ML
12.5 INJECTION INTRAMUSCULAR; INTRAVENOUS AS NEEDED
Status: DISCONTINUED | OUTPATIENT
Start: 2024-02-19 | End: 2024-02-19 | Stop reason: HOSPADM

## 2024-02-19 RX ORDER — ALPRAZOLAM 0.5 MG/1
1 TABLET ORAL ONCE
Status: COMPLETED | OUTPATIENT
Start: 2024-02-19 | End: 2024-02-19

## 2024-02-19 RX ORDER — DIAZEPAM 5 MG/ML
5 INJECTION, SOLUTION INTRAMUSCULAR; INTRAVENOUS ONCE
Status: COMPLETED | OUTPATIENT
Start: 2024-02-19 | End: 2024-02-19

## 2024-02-19 RX ORDER — SODIUM CHLORIDE, SODIUM LACTATE, POTASSIUM CHLORIDE, CALCIUM CHLORIDE 600; 310; 30; 20 MG/100ML; MG/100ML; MG/100ML; MG/100ML
INJECTION, SOLUTION INTRAVENOUS CONTINUOUS PRN
Status: DISCONTINUED | OUTPATIENT
Start: 2024-02-19 | End: 2024-02-19 | Stop reason: SURG

## 2024-02-19 RX ORDER — SODIUM CHLORIDE 9 MG/ML
INJECTION, SOLUTION INTRAVENOUS CONTINUOUS
Status: DISCONTINUED | OUTPATIENT
Start: 2024-02-19 | End: 2024-02-19

## 2024-02-19 RX ORDER — BISACODYL 10 MG
10 SUPPOSITORY, RECTAL RECTAL
Status: DISCONTINUED | OUTPATIENT
Start: 2024-02-19 | End: 2024-02-23

## 2024-02-19 RX ORDER — AMLODIPINE BESYLATE 5 MG/1
10 TABLET ORAL DAILY
Status: DISCONTINUED | OUTPATIENT
Start: 2024-02-19 | End: 2024-02-23

## 2024-02-19 RX ORDER — POLYETHYLENE GLYCOL 3350 17 G/17G
17 POWDER, FOR SOLUTION ORAL DAILY PRN
Status: DISCONTINUED | OUTPATIENT
Start: 2024-02-19 | End: 2024-02-23

## 2024-02-19 RX ORDER — DIAZEPAM 5 MG/ML
2.5 INJECTION, SOLUTION INTRAMUSCULAR; INTRAVENOUS EVERY 6 HOURS PRN
Status: DISCONTINUED | OUTPATIENT
Start: 2024-02-19 | End: 2024-02-20

## 2024-02-19 RX ORDER — SODIUM CHLORIDE, SODIUM LACTATE, POTASSIUM CHLORIDE, CALCIUM CHLORIDE 600; 310; 30; 20 MG/100ML; MG/100ML; MG/100ML; MG/100ML
INJECTION, SOLUTION INTRAVENOUS CONTINUOUS
Status: DISCONTINUED | OUTPATIENT
Start: 2024-02-19 | End: 2024-02-19

## 2024-02-19 RX ORDER — ONDANSETRON 2 MG/ML
4 INJECTION INTRAMUSCULAR; INTRAVENOUS ONCE
Status: COMPLETED | OUTPATIENT
Start: 2024-02-19 | End: 2024-02-19

## 2024-02-19 RX ORDER — HYDROMORPHONE HYDROCHLORIDE 1 MG/ML
0.6 INJECTION, SOLUTION INTRAMUSCULAR; INTRAVENOUS; SUBCUTANEOUS EVERY 5 MIN PRN
Status: DISCONTINUED | OUTPATIENT
Start: 2024-02-19 | End: 2024-02-19 | Stop reason: HOSPADM

## 2024-02-19 RX ORDER — MELATONIN
3 NIGHTLY PRN
Status: DISCONTINUED | OUTPATIENT
Start: 2024-02-19 | End: 2024-02-23

## 2024-02-19 RX ORDER — ACETAMINOPHEN 500 MG
1000 TABLET ORAL ONCE
Status: DISCONTINUED | OUTPATIENT
Start: 2024-02-19 | End: 2024-02-19

## 2024-02-19 RX ORDER — HYDROCODONE BITARTRATE AND ACETAMINOPHEN 5; 325 MG/1; MG/1
2 TABLET ORAL ONCE AS NEEDED
Status: DISCONTINUED | OUTPATIENT
Start: 2024-02-19 | End: 2024-02-19 | Stop reason: HOSPADM

## 2024-02-19 RX ORDER — PROCHLORPERAZINE EDISYLATE 5 MG/ML
5 INJECTION INTRAMUSCULAR; INTRAVENOUS EVERY 8 HOURS PRN
Status: DISCONTINUED | OUTPATIENT
Start: 2024-02-19 | End: 2024-02-19 | Stop reason: HOSPADM

## 2024-02-19 RX ADMIN — SODIUM CHLORIDE, SODIUM LACTATE, POTASSIUM CHLORIDE, CALCIUM CHLORIDE: 600; 310; 30; 20 INJECTION, SOLUTION INTRAVENOUS at 10:03:00

## 2024-02-19 RX ADMIN — LIDOCAINE HYDROCHLORIDE 5 ML: 10 INJECTION, SOLUTION EPIDURAL; INFILTRATION; INTRACAUDAL; PERINEURAL at 10:06:00

## 2024-02-19 NOTE — ED INITIAL ASSESSMENT (HPI)
Pt arrives to ED with c/o chest pain since 10pm last night in the center of her chest, denies any shortness of breath. Pt states that she has also been having frequent panic attacks.

## 2024-02-19 NOTE — PLAN OF CARE
NURSING ADMISSION NOTE      Patient admitted via Cart  Oriented to room.  Safety precautions initiated.  Bed in low position.  Call light in reach.      Admission navigator completed. Patient endorses pain, see EMR. In stable condition at time of admission. On RA, continuous cardiac and pulse ox in place. Safety precautions are in place.

## 2024-02-19 NOTE — CM/SW NOTE
Received a call from ER RN Ginny requesting resources for patient. IVONNE spoke to patient who stated she has nowhere to go. She has family in [place] but for the meantime she is homeless. LOUISM gave resources for homeless shelters and food pantries around the area. Patient thankful  and denied any other need from [facility].
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
## 2024-02-19 NOTE — CONSULTS
GASTROENTEROLOGY CONSULTATION  Efraín Hart MD    Department of Gastroenterology  [facility]    Mago Tovar Patient Status:  Emergency    3/31/1985 MRN [id number]   Location [facility] EMERGENCY DEPARTMENT Attending Marck Boone MD   Hosp Day # 0 PCP None Pcp     Reason for Consultation:  Dysphagia  GERD  Esophageal obstruction    History of Present Illness:  Mago Tovar is a a(n) 38 year old female with hx of anxiety and panic disorder, and gerd, consult requested for dysphagia with vomiting and apparent esophageal obstruction.  Pt seen in ER overnight with panic attacks.   Once panic attack resolved, pt with sense of inability to swallow, unable to drink water without vomiting.  Symptoms started after eating roast beef last bm.      Pt seen by [facility] - Dr. Boyd for similar complaints - chronic nausea and vomiting, and heartburn.  Pt has been tried on multiple PPIs and nortriptyline in the past.   EGD  2022 showed  h pylori gastritis - rx'ed with antibiotics , colonoscopy 2022 - poor prep    EGD Operative Report           Mago Tovar Patient Status:  No patient class for patient encounter    3/31/1985 MRN [id number]   Location GASTROENTEROLOGY - [address] [place] [address] Attending No att. providers found   Hosp Day #    0 PCP ROSMERY RAMIREZ         Pre-op Diagnosis: Abdominal bloating, Functional dyspepsia, GERD; Dysphagia      Post-Op Diagnosis:                ESOPHAGUS:  Normal esophagus. No hiatal hernia, No evidence of esophagitis                STOMACH:  Moderate diffuse gastritis. Biopsied for H. Pylori infection                DUODENUM:  Normal duodenum. Biopsied for celiac disease      Procedure Performed: EGD      Informed Consent: Informed consent for both the procedure and sedation were obtained from the patient. The potentially life-threatening complications of sedation, bleeding,  Perforation, transfusion or repeat endoscopy were reviewed along with  the possible need for hospitalization, surgical management, transfusion or repeat endoscopy should one of these complications arise. The patient understands and is agreeable to proceed.  Sedation Type: MAC-Patient received sedation with monitored anesthesia provided by an anesthesiologist  Moderate Sedation Time: None.  Deep sedation provided by anesthesia.  Procedure Description: The patient was placed in the left lateral decubitus position.  A bite block was placed in the patient’s mouth.  The endoscope was inserted through the mouth and advanced under direct visualization to the 3rd portion of the duodenum and was then withdrawn to examine the duodenal bulb and gastric antrum.  The endoscope was then retroflexed to examine the angulus, GE junction, cardia, body and fundus and then withdrawn to examine the esophagus. The endoscope was then removed from the patient. The patient tolerated the procedure well with no immediate complications and was transferred to the recovery area in stable condition.  Findings:                ESOPHAGUS:  Normal esophagus. No hiatal hernia, No evidence of esophagitis                STOMACH:  Moderate diffuse gastritis. Biopsied for H. Pylori infection                DUODENUM:  Normal duodenum. Biopsied for celiac disease   Recommendations:   Pantoprazole 40 mg daily   Nortriptyline 10 mg three times daily for 3 months   Low FODMAP diet   Discharge:  The patient was given an after visit summary detailing the procedure, findings, recommendations and follow up plans.  Shakir Boyd MD  9/19/2022  7:41 AM       History:  Past Medical History:   Diagnosis Date    Anxiety     Essential hypertension      Past Surgical History:   Procedure Laterality Date    APPENDECTOMY      REMOVAL GALLBLADDER      TOTAL ABDOM HYSTERECTOMY       History reviewed. No pertinent family history.   reports that she has never smoked. She does not have any smokeless tobacco history on file. She reports current  alcohol use. She reports that she does not use drugs.    Allergies:  Allergies   Allergen Reactions    Ketorolac Tromethamine HIVES    Metoclopramide SHORTNESS OF BREATH    Naproxen HIVES       Medications:    Current Facility-Administered Medications:     sodium chloride 0.9% infusion, 125 mL/hr, Intravenous, Continuous    potassium chloride 20 mEq/100mL IVPB premix 20 mEq, 20 mEq, Intravenous, Once    Review of Systems:  Gastrointestinal: See above  General: Denies fatigue, chills/fever, night sweats, weight loss, loss of appetite, weight gain, sleep disturbance.  Cardiovascular: Denies history of heart murmur, chest pain or angina.  Respiratory: Denies shortness of breath, chronic/frequent hoarseness, wheezing, chronic cough, cough up sputum.  Genitourinary: Denies kidney stones, painful/difficult urination, frequent urinary infections, frequent urination, blood in urine, incontinence, kidney failure.  Psychosocial: noncontributory  Neuro: no tremor, dysarthria, gait disturbance  Skin: Denies severe itching, unusual moles, rash, flushing, change in hair or nails.  Bone/joint: No joint pain or inflammation  Heme/Lymphatic: Denies easy bruising, anemia, excessive bleeding, enlarging or painful lymph nodes.  Allergy: Denies medication allergy, latex/rubber allergy, anaphylactic or other reaction to anesthesia, food allergy.   Eyes: Denies blurred/double vision, eye disease, glasses or contacts, glaucoma.  ENT: Denies nose or gums bleeding, mouth sores, bad breath or bad taste in mouth, hearing loss.  Physical Exam:    Blood pressure 111/83, pulse 103, temperature 99.4 °F (37.4 °C), temperature source Temporal, resp. rate 15, SpO2 95%, not currently breastfeeding.    General: Appears alert, oriented x3 and in no acute distress.  HEENT: Normal. No neck vein distention. Thyroid not enlarged.  No lymphadenopathy.  CV: S1 and S2 normal.  No murmurs or gallops.  Lungs: Clear to auscultation.  Abdomen: Soft and  nondistended.  Nontender.  No masses.  Bowel sounds are present.  Back: No CVA tenderness.  Extremities: No edema, cyanosis, or clubbing.  Skin: Warm and dry.  Rectal: Normal perianal areas, no masses felt.  There was not stool to test.  Laboratory Data:  Lab Results   Component Value Date    WBC 9.6 02/19/2024    HGB 14.2 02/19/2024    HCT 44.3 02/19/2024    .0 02/19/2024    CREATSERUM 0.98 02/19/2024    BUN 13 02/19/2024     02/19/2024    K 3.3 02/19/2024     02/19/2024    CO2 24.0 02/19/2024     02/19/2024    CA 10.2 02/19/2024    ALB 4.1 02/19/2024    ALKPHO 106 02/19/2024    BILT 0.3 02/19/2024    TP 9.0 02/19/2024    AST 17 02/19/2024    ALT 19 02/19/2024    TSH 1.410 02/19/2024    DDIMER 0.45 02/19/2024         Assessment:    Dysphagia  GERD  Esophageal obstruction  Chest pain   Abdominal pain        The patient's complaint of dysphagia seems most likely related to a peptic stricture from GERD vs eosinophillic esophogitis.   Other diagnostic possibilities include achalasia, gastroesohpageal cancer.    Plan:       1. EGD with possible dilation.     Risks of perforation and bleeding reviewed.       2.  Will admit pt under DUL[facility] hospitalist for further evaluation of abdominal pain.       Thank you for allowing to participate in the care of this patient.    Efraín Hart MD  2/19/2024  8:20 AM

## 2024-02-19 NOTE — BRIEF OP NOTE
Pre-Operative Diagnosis: food bolus     Post-Operative Diagnosis: hiatal hernia, esophagitis, gastritis      Procedure Performed:   ESOPHAGOGASTRODUODENOSCOPY (EGD) WITH BIOPSIES    Surgeon(s) and Role:     * Efraín Hart MD - Primary    Assistant(s):        Surgical Findings: see above     Specimen: esophagus and stomach     Estimated Blood Loss: No data recorded    Dictation Number:  none    Efraín Hart MD  2/19/2024  10:31 AM

## 2024-02-19 NOTE — ANESTHESIA POSTPROCEDURE EVALUATION
[facility] Patient Status:  Emergency   Age/Gender 38 year old female MRN [id number]   Location [facility] ENDOSCOPY PAIN CENTER Attending No att. providers found   Hosp Day # 0 PCP None Pcp       Anesthesia Post-op Note    ESOPHAGOGASTRODUODENOSCOPY (EGD) WITH BIOPSIES    Procedure Summary       Date: 02/19/24 Room / Location:  ENDOSCOPY 03 / [facility]    Anesthesia Start: 1003 Anesthesia Stop: 1021    Procedure: ESOPHAGOGASTRODUODENOSCOPY (EGD) WITH BIOPSIES Diagnosis: (hiatal hernia, esophagitis, gastritis)    Surgeons: Efraín Hart MD Anesthesiologist: Allen Farfan MD    Anesthesia Type: MAC ASA Status: 2            Anesthesia Type: MAC    Vitals Value Taken Time   /118 02/19/24 1022   Temp  02/19/24 1027   Pulse 80 02/19/24 1026   Resp 18 02/19/24 1027   SpO2 100 % 02/19/24 1026   Vitals shown include unfiled device data.    Patient Location: Endoscopy    Anesthesia Type: MAC    Airway Patency: patent    Postop Pain Control: adequate    Mental Status: preanesthetic baseline    Nausea/Vomiting: none    Cardiopulmonary/Hydration status: stable euvolemic    Complications: no apparent anesthesia related complications    Postop vital signs: stable    Dental Exam: Unchanged from Preop    Patient to be discharged home when criteria met.

## 2024-02-19 NOTE — OPERATIVE REPORT
PATIENT NAME: Mago Tovar  MRN: [id number]  DATE OF OPERATION:  2/19/24  REFERRING PHYSICIAN: Dr. Boone  Medications:  MAC  PREOPERATIVE DIAGNOSIS    Dysphagia   Chest and abdominal pain   GERD  POSTOPERATIVE DIAGNOSIS:  Circumferential ulcerations at the GE junction.  Some retained fluid in the esophagus without retained solid material.  No stricture noted.    The lower esophageal sphincter area was tight, without stricture, and required pressure to pass the GE junction, suspicious for achalasia.  Biopsies of the esophagus were done to assess for eosinophilic esophagitis.   2 - 3 cm hiatal hernia.  Normal stomach.  Biopsies were done to assess for h pylori infection, given prior hx of h pylori infection.  Normal duodenum.    PROCEDURE PERFORMED:                Esophagogastroduodenoscopy with biopsies   Endoscopist:  Efraín Hart MD     PROCEDURE AND FINDINGS: The patient was placed into the left lateral decubitus after informed consent was obtained.  All questions were answered.  An updated history and physical were performed and an ASA score of 2 was assigned. Informed consent was obtained prior to the procedure, with review of possible complications including bleeding, infection, and missed cancer.  MAC sedation was administered.   The Olympus video gastroscope was introduced into the mouth and passed into the esophogus after administration of MAC sedation.  The entire examined esophagus was remarkable for circumferential ulcerations at the GE junction.  Some retained fluid in the esophagus was noted, without retained solid material.  No stricture noted.    The lower esophageal sphincter area was tight, without stricture, and required pressure to pass the GE junction, suspicious for achalasia.  Biopsies of the esophagus were done to assess for eosinophilic esophagitis.   There was a 2 - 3 cm hiatal hernia.  The entire examined stomach,  including retroflexion view was remarkable for normal mucosa.   Biopsies were done to assess for h pylori infection, given prior hx of treated h pylori infection.  The examined duodenum to the third portion was normal.   The gastroscope was removed from the patient.  The procedure was completed. The patient tolerated the procedure well.    RECOMMENDATIONS   Pt to be admitted for further evaluation of abdominal pain.  Ct abdomen and pelvis with contrast ordered.  Full liquid diet.  Pending ct abdomen and pelvis results, consider esophageal motility study vs esophagram to further assess for achalasia.  Efraín Hart MD, Gastroenterologist  Cc: Dr. Boone

## 2024-02-19 NOTE — ED PROVIDER NOTES
History     Chief Complaint   Patient presents with    Chest Pain Angina    Anxiety/Panic attack       HPI    38 year old female with history of anxiety, hypertension presents for evaluation.  Patient states for couple weeks now she has had significant anxiety, having frequent panic attacks with feeling dyspnea, tingling, sense of doom, unable to sleep, racing heart rate.  She was previously on sertraline and diazepam but she stopped taking them a few months ago.  She is worsening symptoms this morning, she feels like there is a pain/blockage in her throat and she is having difficulty swallowing where she vomits things up now.  She had roast for dinner last night, she is unsure if she feels like there is something stuck in her throat          Past Medical History:   Diagnosis Date    Anxiety     Essential hypertension        Past Surgical History:   Procedure Laterality Date    APPENDECTOMY      REMOVAL GALLBLADDER      TOTAL ABDOM HYSTERECTOMY         Social History     Socioeconomic History    Marital status: Single   Tobacco Use    Smoking status: Never   Vaping Use    Vaping Use: Never used   Substance and Sexual Activity    Alcohol use: Yes     Comment: social    Drug use: Never                   Physical Exam     ED Triage Vitals [02/19/24 0528]   /83   Pulse 120   Resp 20   Temp 99.4 °F (37.4 °C)   Temp src Temporal   SpO2 95 %   O2 Device None (Room air)       Physical Exam  Constitutional:       General: She is in acute distress.   HENT:      Mouth/Throat:      Pharynx: No oropharyngeal exudate or posterior oropharyngeal erythema.   Eyes:      Extraocular Movements: Extraocular movements intact.   Cardiovascular:      Rate and Rhythm: Tachycardia present.      Pulses: Normal pulses.   Pulmonary:      Effort: Pulmonary effort is normal. No respiratory distress.   Abdominal:      General: Abdomen is flat. There is no distension.      Tenderness: There is no abdominal tenderness. There is no  guarding.   Musculoskeletal:         General: No swelling or deformity.      Cervical back: Normal range of motion.   Skin:     General: Skin is warm.   Neurological:      General: No focal deficit present.      Mental Status: She is alert.   Psychiatric:         Mood and Affect: Mood is anxious.              ED Course     Labs Reviewed   COMP METABOLIC PANEL (14) - Abnormal; Notable for the following components:       Result Value    Glucose 103 (*)     Potassium 3.3 (*)     Calcium, Total 10.2 (*)     Alkaline Phosphatase 106 (*)     Total Protein 9.0 (*)     Globulin  4.9 (*)     A/G Ratio 0.8 (*)     All other components within normal limits   CBC W/ DIFFERENTIAL - Abnormal; Notable for the following components:    RBC 5.47 (*)     Lymphocyte Absolute 4.10 (*)     All other components within normal limits   TROPONIN I HIGH SENSITIVITY - Normal   D-DIMER - Normal   TSH W REFLEX TO FREE T4 - Normal   SARS-COV-2/FLU A AND B/RSV BY PCR (GENEXPERT) - Normal    Narrative:     This test is intended for the qualitative detection and differentiation of SARS-CoV-2, influenza A, influenza B, and respiratory syncytial virus (RSV) viral RNA in nasopharyngeal or nares swabs from individuals suspected of respiratory viral infection consistent with COVID-19 by their healthcare provider. Signs and symptoms of respiratory viral infection due to SARS-CoV-2, influenza, and RSV can be similar.    Test performed using the Xpert Xpress SARS-CoV-2/FLU/RSV (real time RT-PCR)  assay on the GeneXpert instrument, [employer], [place] [address].   This test is being used under the Food and Drug Administration's Emergency Use Authorization.    The authorized Fact Sheet for Healthcare Providers for this assay is available upon request from the laboratory.   CBC WITH DIFFERENTIAL WITH PLATELET    Narrative:     The following orders were created for panel order CBC With Differential With Platelet.  Procedure                                Abnormality         Status                     ---------                               -----------         ------                     CBC W/ DIFFERENTIAL[[id number]]          Abnormal            Final result                 Please view results for these tests on the individual orders.   TROPONIN I HIGH SENSITIVITY   RAINBOW DRAW LAVENDER   RAINBOW DRAW LIGHT GREEN   RAINBOW DRAW BLUE   RAINBOW DRAW GOLD     XR CHEST AP PORTABLE  (CPT=71045)    Result Date: 2/19/2024  CONCLUSION:  Normal examination for a patient of this age.    LOCATION:  [facility]      Dictated by (CST): Odilon Lowry MD on 2/19/2024 at 8:01 AM     Finalized by (CST): Odilon Lowry MD on 2/19/2024 at 8:02 AM            MDM     Vitals:    02/19/24 0528 02/19/24 0645 02/19/24 0745 02/19/24 0830   BP: 111/83   (!) 150/95   Pulse: 120 114 103 87   Resp: 20 15     Temp: 99.4 °F (37.4 °C)      TempSrc: Temporal      SpO2: 95% 96% 95% 99%       Anxiety/panic, electro abnormalities, viral syndrome, esophageal food impaction, PE on differential  Give fluids, antiemetics and anxiety control    ED Course as of 02/19/24 0856  ------------------------------------------------------------  Time: 02/19 0602  Comment: EKG interpretation by me: EKG sinus rhythm at a rate of 127, axis nomal, no concerning acute ischemic ST changes  ------------------------------------------------------------  Time: 02/19 0701  Comment: CXR interpretation by me with no concerning acute findings    ------------------------------------------------------------  Time: 02/19 0717  Comment: Troponin negative, dimer normal.  Thyroid levels within normal limits.  Labs otherwise with reassuring renal function and hemoglobin.  ------------------------------------------------------------  Time: 02/19 0805  Comment: Upon reevaluation patient is having no improvement, attempting oral intake with immediate regurgitation or vomiting.  Concern for food esophageal impaction.  Will consult with  GI         Disposition and Plan     Clinical Impression:  1. Esophageal obstruction due to food impaction    2. Panic attack        Disposition:  Send to or/cath/gi    Follow-up:  No follow-up provider specified.    Medications Prescribed:  Current Discharge Medication List

## 2024-02-19 NOTE — H&P
[facility]  [facility]     Mago Tovar Patient Status:  Inpatient    3/31/1985 MRN [id number]   Location [facility] 3NE-A Attending No att. providers found   Hosp Day # 0 PCP None Pcp     Chief Complaint: Chest pain     Subjective:    History of Present Illness:     Mago Tovar is a 38 year old female with past medical history of hypertension and anxiety who presented to the emergency department with chest pains.  Patient with multiple complaints, states she has been having chest pain, abdominal pain, and feeling like something was stuck in her throat when swallowing.  Patient does have severe anxiety, she says she stopped taking her sertraline about 2 months ago because she felt like her symptoms were better controlled.  About 1 month ago is when she began having worsening anxiety.  She has over the last 2 to 3 days has been having persistent panic attacks has been occurring every couple of hours.  Today she began having chest discomfort, and symptoms already noted above.  Patient was seen after EGD, she still anxious about what is going on.  She denies any fevers, no cough, no shortness of breath, no other acute complaints.    History/Other:    Past Medical History:  Past Medical History:   Diagnosis Date    Anxiety     Essential hypertension      Past Surgical History:   Past Surgical History:   Procedure Laterality Date    APPENDECTOMY      REMOVAL GALLBLADDER      TOTAL ABDOM HYSTERECTOMY        Family History:   History reviewed. No pertinent family history.  Social History:    reports that she has never smoked. She does not have any smokeless tobacco history on file. She reports current alcohol use. She reports that she does not use drugs.     Allergies:   Allergies   Allergen Reactions    Ketorolac Tromethamine HIVES    Metoclopramide SHORTNESS OF BREATH    Naproxen HIVES    Prochlorperazine SHORTNESS OF BREATH       Medications:    No current  facility-administered medications on file prior to encounter.     Current Outpatient Medications on File Prior to Encounter   Medication Sig Dispense Refill    QUEtiapine 25 MG Oral Tab Take 1 tablet (25 mg total) by mouth nightly.      losartan 50 MG Oral Tab Take 2 tablets (100 mg total) by mouth daily. 30 tablet 1    amLODIPine 10 MG Oral Tab Take 1 tablet (10 mg total) by mouth daily. 30 tablet 1    sertraline 50 MG Oral Tab Take 1 tablet (50 mg total) by mouth daily. (Patient not taking: Reported on 2/19/2024) 30 tablet 1    traZODone 50 MG Oral Tab Take 2 tablets (100 mg total) by mouth nightly. (Patient not taking: Reported on 2/19/2024) 30 tablet 1       Review of Systems:   A comprehensive review of systems was completed.    Pertinent positives and negatives noted in the HPI.    Objective:   Physical Exam:    BP (!) 155/131 (BP Location: Right arm)   Pulse 105   Temp 98.5 °F (36.9 °C) (Oral)   Resp 19   Wt 200 lb 11.2 oz (91 kg)   SpO2 98%   BMI 36.71 kg/m²   General: mild distress, anxious, pleasant   Respiratory: No rhonchi, no wheezes  Cardiovascular: S1, S2. Regular rate and rhythm  Abdomen: Soft, Non-tender, non-distended, positive bowel sounds  Neuro: No new focal deficits  Extremities: No edema    Results:    Labs:      Labs Last 24 Hours:    Recent Labs   Lab 02/19/24  0538   RBC 5.47*   HGB 14.2   HCT 44.3   MCV 81.0   MCH 26.0   MCHC 32.1   RDW 14.3   NEPRELIM 4.75   WBC 9.6   .0       Recent Labs   Lab 02/19/24  0538   *   BUN 13   CREATSERUM 0.98   EGFRCR 75   CA 10.2*   ALB 4.1      K 3.3*      CO2 24.0   ALKPHO 106*   AST 17   ALT 19   BILT 0.3   TP 9.0*       No results found for: \"PT\", \"INR\"    Recent Labs   Lab 02/19/24  0538   TROPHS 6       No results for input(s): \"TROP\", \"PBNP\" in the last 168 hours.    No results for input(s): \"PCT\" in the last 168 hours.    Imaging: Imaging data reviewed in Epic.    Assessment & Plan:      #Chest pain  EKG sinus  tachycardia  CXR clear  Trop 6, will trend  Dimer 0.45, TSH 1.41  Monitor on telemetry     #Abdominal pain/bloating  EGD with with possible achalasia vs eosinophilic esophagitis  Biopsy sent  Ct abd today  GI following    #Anxiety/Panic attacks  Resume home sertraline, prn diazepam     #Essential HTN- resume amlodipine         Plan of care discussed with patient, er physician, nurse    Ney Blackmon MD    Supplementary Documentation:     The 21st Century Cures Act makes medical notes like these available to patients in the interest of transparency. Please be advised this is a medical document. Medical documents are intended to carry relevant information, facts as evident, and the clinical opinion of the practitioner. The medical note is intended as peer to peer communication and may appear blunt or direct. It is written in medical language and may contain abbreviations or verbiage that are unfamiliar.

## 2024-02-19 NOTE — PLAN OF CARE
Patient a&0x4, endorses abdominal pain and nausea, continuous cardiac and pulse ox in place, up with assist for safety.   PRN Zofran administered, patient continues to endorse nausea and spit out PRN Tylenol and zoloft and amlodopine.   Dr. Sima rea, dr. Blackmon called and will order haldol x1 as patient is allergic to compazine and reglan.  Patient off floor for CT scan.  1550- pt returned from CT, will  re-attempt PO meds now.  Safety precautions are in place.  Problem: PAIN - ADULT  Goal: Verbalizes/displays adequate comfort level or patient's stated pain goal  Description: INTERVENTIONS:  - Encourage pt to monitor pain and request assistance  - Assess pain using appropriate pain scale  - Administer analgesics based on type and severity of pain and evaluate response  - Implement non-pharmacological measures as appropriate and evaluate response  - Consider cultural and social influences on pain and pain management  - Manage/alleviate anxiety  - Utilize distraction and/or relaxation techniques  - Monitor for opioid side effects  - Notify MD/LIP if interventions unsuccessful or patient reports new pain  - Anticipate increased pain with activity and pre-medicate as appropriate  Outcome: Progressing     Problem: SAFETY ADULT - FALL  Goal: Free from fall injury  Description: INTERVENTIONS:  - Assess pt frequently for physical needs  - Identify cognitive and physical deficits and behaviors that affect risk of falls.  - [place] fall precautions as indicated by assessment.  - Educate pt/family on patient safety including physical limitations  - Instruct pt to call for assistance with activity based on assessment  - Modify environment to reduce risk of injury  - Provide assistive devices as appropriate  - Consider OT/PT consult to assist with strengthening/mobility  - Encourage toileting schedule  Outcome: Progressing     Problem: GASTROINTESTINAL - ADULT  Goal: Minimal or absence of nausea and vomiting  Description:  INTERVENTIONS:  - Maintain adequate hydration with IV or PO as ordered and tolerated  - Nasogastric tube to low intermittent suction as ordered  - Evaluate effectiveness of ordered antiemetic medications  - Provide nonpharmacologic comfort measures as appropriate  - Advance diet as tolerated, if ordered  - Obtain nutritional consult as needed  - Evaluate fluid balance  Outcome: Progressing

## 2024-02-19 NOTE — ANESTHESIA PREPROCEDURE EVALUATION
PRE-OP EVALUATION    Patient Name: Mago Tovar    Admit Diagnosis: No admission diagnoses are documented for this encounter.    Pre-op Diagnosis: food bolus    ESOPHAGOGASTRODUODENOSCOPY (EGD) with food bolus removal    Anesthesia Procedure: ESOPHAGOGASTRODUODENOSCOPY (EGD) with food bolus removal    Surgeon(s) and Role:     * Efraín Hart MD - Primary    Pre-op vitals reviewed.  Temp: 99.4 °F (37.4 °C)  Pulse: 87  Resp: 15  BP: 150/95  SpO2: 99 %  There is no height or weight on file to calculate BMI.    Current medications reviewed.  Hospital Medications:   [COMPLETED] sodium chloride 0.9 % IV bolus 1,000 mL  1,000 mL Intravenous Once    [COMPLETED] ALPRAZolam (Xanax) tab 1 mg  1 mg Oral Once    [COMPLETED] ondansetron (Zofran) 4 MG/2ML injection 4 mg  4 mg Intravenous Once    [COMPLETED] diazepam (Valium) 5 mg/mL injection 5 mg  5 mg Intravenous Once    sodium chloride 0.9% infusion  125 mL/hr Intravenous Continuous    potassium chloride 20 mEq/100mL IVPB premix 20 mEq  20 mEq Intravenous Once       Outpatient Medications:   (Not in a hospital admission)      Allergies: Ketorolac tromethamine, Metoclopramide, and Naproxen      Anesthesia Evaluation    Patient summary reviewed.    Anesthetic Complications           GI/Hepatic/Renal  Comment: Esophageal bolus                                Cardiovascular        Exercise tolerance: good     MET: >4      (+) hypertension                                     Endo/Other    Negative endo/other ROS.                              Pulmonary    Negative pulmonary ROS.                       Neuro/Psych        (+) anxiety                              Past Surgical History:   Procedure Laterality Date    APPENDECTOMY      REMOVAL GALLBLADDER      TOTAL ABDOM HYSTERECTOMY       Social History     Socioeconomic History    Marital status: Single   Tobacco Use    Smoking status: Never   Vaping Use    Vaping Use: Never used   Substance and Sexual Activity     Alcohol use: Yes     Comment: social    Drug use: Never     History   Drug Use Unknown     Available pre-op labs reviewed.  Lab Results   Component Value Date    WBC 9.6 02/19/2024    RBC 5.47 (H) 02/19/2024    HGB 14.2 02/19/2024    HCT 44.3 02/19/2024    MCV 81.0 02/19/2024    MCH 26.0 02/19/2024    MCHC 32.1 02/19/2024    RDW 14.3 02/19/2024    .0 02/19/2024     Lab Results   Component Value Date     02/19/2024    K 3.3 (L) 02/19/2024     02/19/2024    CO2 24.0 02/19/2024    BUN 13 02/19/2024    CREATSERUM 0.98 02/19/2024     (H) 02/19/2024    CA 10.2 (H) 02/19/2024            Airway      Mallampati: III  Mouth opening: >3 FB  TM distance: > 6 cm   Cardiovascular    Cardiovascular exam normal.         Dental             Pulmonary    Pulmonary exam normal.                 Other findings              ASA: 2   Plan: MAC  NPO status verified and patient meets guidelines.          Plan/risks discussed with: patient                Present on Admission:  **None**

## 2024-02-20 ENCOUNTER — APPOINTMENT (OUTPATIENT)
Dept: GENERAL RADIOLOGY | Facility: HOSPITAL | Age: 39
End: 2024-02-20
Attending: INTERNAL MEDICINE
Payer: MEDICAID

## 2024-02-20 LAB
ANION GAP SERPL CALC-SCNC: 7 MMOL/L (ref 0–18)
BASOPHILS # BLD AUTO: 0.05 X10(3) UL (ref 0–0.2)
BASOPHILS NFR BLD AUTO: 0.8 %
BUN BLD-MCNC: 13 MG/DL (ref 9–23)
CALCIUM BLD-MCNC: 8.4 MG/DL (ref 8.5–10.1)
CHLORIDE SERPL-SCNC: 109 MMOL/L (ref 98–112)
CO2 SERPL-SCNC: 24 MMOL/L (ref 21–32)
CREAT BLD-MCNC: 0.67 MG/DL
EGFRCR SERPLBLD CKD-EPI 2021: 113 ML/MIN/1.73M2 (ref 60–?)
EOSINOPHIL # BLD AUTO: 0.17 X10(3) UL (ref 0–0.7)
EOSINOPHIL NFR BLD AUTO: 2.6 %
ERYTHROCYTE [DISTWIDTH] IN BLOOD BY AUTOMATED COUNT: 14.3 %
GLUCOSE BLD-MCNC: 95 MG/DL (ref 70–99)
HCT VFR BLD AUTO: 38.7 %
HGB BLD-MCNC: 12.1 G/DL
IMM GRANULOCYTES # BLD AUTO: 0.02 X10(3) UL (ref 0–1)
IMM GRANULOCYTES NFR BLD: 0.3 %
LYMPHOCYTES # BLD AUTO: 2.45 X10(3) UL (ref 1–4)
LYMPHOCYTES NFR BLD AUTO: 37.8 %
MAGNESIUM SERPL-MCNC: 2.1 MG/DL (ref 1.6–2.6)
MCH RBC QN AUTO: 25.5 PG (ref 26–34)
MCHC RBC AUTO-ENTMCNC: 31.3 G/DL (ref 31–37)
MCV RBC AUTO: 81.6 FL
MONOCYTES # BLD AUTO: 0.31 X10(3) UL (ref 0.1–1)
MONOCYTES NFR BLD AUTO: 4.8 %
NEUTROPHILS # BLD AUTO: 3.48 X10 (3) UL (ref 1.5–7.7)
NEUTROPHILS # BLD AUTO: 3.48 X10(3) UL (ref 1.5–7.7)
NEUTROPHILS NFR BLD AUTO: 53.7 %
OSMOLALITY SERPL CALC.SUM OF ELEC: 290 MOSM/KG (ref 275–295)
PLATELET # BLD AUTO: 231 10(3)UL (ref 150–450)
POTASSIUM SERPL-SCNC: 2.9 MMOL/L (ref 3.5–5.1)
POTASSIUM SERPL-SCNC: 2.9 MMOL/L (ref 3.5–5.1)
POTASSIUM SERPL-SCNC: 3.8 MMOL/L (ref 3.5–5.1)
RBC # BLD AUTO: 4.74 X10(6)UL
SODIUM SERPL-SCNC: 140 MMOL/L (ref 136–145)
WBC # BLD AUTO: 6.5 X10(3) UL (ref 4–11)

## 2024-02-20 PROCEDURE — 99232 SBSQ HOSP IP/OBS MODERATE 35: CPT | Performed by: HOSPITALIST

## 2024-02-20 PROCEDURE — 74246 X-RAY XM UPR GI TRC 2CNTRST: CPT | Performed by: INTERNAL MEDICINE

## 2024-02-20 RX ORDER — POTASSIUM CHLORIDE 14.9 MG/ML
20 INJECTION INTRAVENOUS ONCE
Status: COMPLETED | OUTPATIENT
Start: 2024-02-20 | End: 2024-02-20

## 2024-02-20 RX ORDER — PANTOPRAZOLE SODIUM 40 MG/1
40 TABLET, DELAYED RELEASE ORAL
Status: DISCONTINUED | OUTPATIENT
Start: 2024-02-20 | End: 2024-02-20

## 2024-02-20 RX ORDER — MORPHINE SULFATE 2 MG/ML
2 INJECTION, SOLUTION INTRAMUSCULAR; INTRAVENOUS EVERY 2 HOUR PRN
Status: DISCONTINUED | OUTPATIENT
Start: 2024-02-20 | End: 2024-02-23

## 2024-02-20 RX ORDER — DIAZEPAM 5 MG/ML
2.5 INJECTION, SOLUTION INTRAMUSCULAR; INTRAVENOUS ONCE
Status: COMPLETED | OUTPATIENT
Start: 2024-02-20 | End: 2024-02-20

## 2024-02-20 RX ORDER — DIAZEPAM 5 MG/ML
5 INJECTION, SOLUTION INTRAMUSCULAR; INTRAVENOUS EVERY 6 HOURS PRN
Status: DISCONTINUED | OUTPATIENT
Start: 2024-02-20 | End: 2024-02-21

## 2024-02-20 RX ORDER — SUCRALFATE ORAL 1 G/10ML
1 SUSPENSION ORAL
Status: DISCONTINUED | OUTPATIENT
Start: 2024-02-20 | End: 2024-02-23

## 2024-02-20 RX ORDER — CLONAZEPAM 0.5 MG/1
0.5 TABLET ORAL 2 TIMES DAILY
Status: DISCONTINUED | OUTPATIENT
Start: 2024-02-20 | End: 2024-02-23
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
## 2024-02-20 NOTE — PROGRESS NOTES
[facility]     Hospitalist Progress Note     Mago Tovar Patient Status:  Inpatient    3/31/1985 MRN [id number]   Formerly [facility] 3NE-A Attending Marck Boone MD   Hosp Day # 2 PCP None Pcp     Chief Complaint: Chest pain     Subjective:   Patient continues to have anxiety and panic attacks re: GI issues. Unable to tolerate liquids or solids. +BM    Current medications:   [Held by provider] clonazePAM  0.5 mg Oral BID    sucralfate  1 g Oral TID AC and HS    pantoprazole  40 mg Intravenous Q12H    [Held by provider] sertraline  50 mg Oral Daily    amLODIPine  10 mg Oral Daily    heparin  5,000 Units Subcutaneous 2 times per day       Objective:    Review of Systems:   10 point ROS completed and was negative, except for pertinent positive and negatives stated in subjective.    Vital signs:  Temp:  [97.9 °F (36.6 °C)-98.5 °F (36.9 °C)] 98.5 °F (36.9 °C)  Pulse:  [74-98] 86  Resp:  [11-23] 18  BP: (153-164)/() 164/105  SpO2:  [95 %-100 %] 100 %  Patient Weight for the past 72 hrs:   Weight   24 1304 200 lb 11.2 oz (91 kg)     Physical Exam:    General: No acute distress.   Respiratory: Clear to auscultation bilaterally.   Cardiovascular: S1, S2. Regular rate and rhythm.   Abdomen: Soft, nontender, nondistended.  Positive bowel sounds.   Extremities: No edema.  Neuro: AAOx3    Diagnostic Data:    Labs:  Recent Labs   Lab 24  0538 24  1034   WBC 9.6 6.5   HGB 14.2 12.1   MCV 81.0 81.6   .0 231.0       Recent Labs   Lab 24  0538 24  1034 24  2121   * 95  --    BUN 13 13  --    CREATSERUM 0.98 0.67  --    CA 10.2* 8.4*  --    ALB 4.1  --   --     140  --    K 3.3* 2.9*  2.9* 3.8    109  --    CO2 24.0 24.0  --    ALKPHO 106*  --   --    AST 17  --   --    ALT 19  --   --    BILT 0.3  --   --    TP 9.0*  --   --        Estimated Creatinine Clearance: 88.3 mL/min (based on SCr of 0.67 mg/dL).    No results for input(s):  \"PTP\", \"INR\" in the last 168 hours.         COVID-19 Lab Results    COVID-19  Lab Results   Component Value Date    COVID19 Not Detected 02/19/2024    COVID19 NOT DETECTED 12/09/2021       Pro-Calcitonin  No results for input(s): \"PCT\" in the last 168 hours.    Cardiac  No results for input(s): \"TROP\", \"PBNP\" in the last 168 hours.    Creatinine Kinase  No results for input(s): \"CK\" in the last 168 hours.    Inflammatory Markers  Recent Labs   Lab 02/19/24  0538   DDIMER 0.45       Recent Labs   Lab 02/19/24 0538 02/19/24  1500   TROPHS 6 9       Imaging: Imaging data reviewed in Epic.    Medications:    [Held by provider] clonazePAM  0.5 mg Oral BID    sucralfate  1 g Oral TID AC and HS    pantoprazole  40 mg Intravenous Q12H    [Held by provider] sertraline  50 mg Oral Daily    amLODIPine  10 mg Oral Daily    heparin  5,000 Units Subcutaneous 2 times per day       Assessment & Plan:    Chest pain, non-cardiac, trop neg  Essential hypertension  Norvasc  Add Hydralazine PRN  Monitor hemodynamics  Dysphagia sp EGD 2/19 with circumferential ulceration at GE junction, some retained fluid without retained solid material. Tight esophageal sphincter, achalasia?  PPI  Carafate  Follow-up pathology   Esophageal motility study   GI consult   Anxiety  Klonopin & Zoloft - unable to tolerate pills  Change Valium to Ativan IV PRN  Hypercalcemia, resolved    Supplementary Documentation:   Quality:  DVT Prophylaxis: Heparin     At this point Ms. Tovar is expected to be discharge to: Home    Plan of care discussed with patient and RN.    Alexx Victoria MD

## 2024-02-20 NOTE — PLAN OF CARE
Assumed pt care at 0730. A&Ox4. VSS. Room air. NSR on tele. NPO currently for XR upper GI this AM. Reports abdominal pain at a 6/10 this AM, denies N/V. R AC PIV infusing 0.9NS @ 83 mL/hr. Regular diet. Voiding, up sba. Heparin subcutaneous for VTE prevention. Pt updated with POC.     0945: patient returned from XR, began to have a panic attack. Reporting symptoms of numbness/tingling to fingers and mouth as well as shortness of breath. HR low 100s, O2 sats mid 90s on room air. Dr. Blackmon notified. Orders for IV valium x1. HR down to 70s, still with O2 sats mid 90s on room air.     1600: Patient unable to tolerate PO Klonopin with SLP at bedside, threw up immediately following administration. Dr. Blackmon notified regarding this and several panic attacks throughout the day. See MAR for increased IV PRN Valium orders.     Problem: GASTROINTESTINAL - ADULT  Goal: Minimal or absence of nausea and vomiting  Description: INTERVENTIONS:  - Maintain adequate hydration with IV or PO as ordered and tolerated  - Nasogastric tube to low intermittent suction as ordered  - Evaluate effectiveness of ordered antiemetic medications  - Provide nonpharmacologic comfort measures as appropriate  - Advance diet as tolerated, if ordered  - Obtain nutritional consult as needed  - Evaluate fluid balance  Outcome: Progressing  Goal: Maintains or returns to baseline bowel function  Description: INTERVENTIONS:  - Assess bowel function  - Maintain adequate hydration with IV or PO as ordered and tolerated  - Evaluate effectiveness of GI medications  - Encourage mobilization and activity  - Obtain nutritional consult as needed  - Establish a toileting routine/schedule  - Consider collaborating with pharmacy to review patient's medication profile  Outcome: Progressing

## 2024-02-20 NOTE — SLP NOTE
ADULT SWALLOWING EVALUATION    ASSESSMENT    ASSESSMENT/OVERALL IMPRESSION:  Patient is a 39 y/o female admitted with chest pain and PMHx significant for anxiety. Patient currently undergoing extensive work-up for esophageal dysphagia. SLP order received to evaluate oropharyngeal swallow. Patient reported multi-day history of globus sensation and vomiting. She feels food/drink is \"getting stuck\" while pointing to her sternum. She denied overt s/s of aspiration with PO intake and reported consuming a regular diet and thin liquids at baseline.     Evaluation limited d/t clear liquid diet restriction. PO trials of thin liquids provided. Bolus acceptance was adequate without evidence of anterior bolus loss. Pharyngeal swallow initiation appeared timely and hyolaryngeal excursion was adequate per palpation. No overt s/s of aspiration observed. Patient did exhibit vomiting following a delay.    Oropharyngeal swallow appears intact with suspected esophageal dysphagia. GI with plans for continued work-up tomorrow. She may benefit from supplemental nutrition/hydration in the meantime as she is unable to keep anything down. No further SLP services warranted at this time.          RECOMMENDATIONS      Diet Recommendations - Liquids:  (clear liquid diet per GI)                              Medication Administration Recommendations: Non-oral (given frequent regurgitation)  Treatment Plan/Recommendations: No further inpatient SLP service warranted    HISTORY   MEDICAL HISTORY  Reason for Referral: R/O aspiration    Problem List  Principal Problem:    Esophageal obstruction due to food impaction  Active Problems:    Panic attack    Abdominal pain of unknown etiology      Past Medical History  Past Medical History:   Diagnosis Date    Anxiety     Essential hypertension           Diet Prior to Admission: Regular;Thin liquids       Patient/Family Goals: none stated    SWALLOWING HISTORY  Current Diet Consistency:  (clear liquid diet  per GI)  Dysphagia History: as above  Imaging Results:   CXR 2/19/24  CONCLUSION:  Normal examination for a patient of this age.          LOCATION:  [facility]                  Dictated by (CST): Odilon Lowry MD on 2/19/2024 at 8:01 AM       Finalized by (CST): Odilon Lowry MD on 2/19/2024 at 8:02 AM     SUBJECTIVE       OBJECTIVE   ORAL MOTOR EXAMINATION  Dentition: Functional  Symmetry: Within Functional Limits  Strength: Within Functional Limits     Range of Motion: Within Functional Limits       Voice Quality: Clear  Respiratory Status: Unlabored  Consistencies Trialed: Thin liquids  Method of Presentation: Self presentation  Patient Positioning: Upright;Midline    Oral Phase of Swallow: Within Functional Limits                      Pharyngeal Phase of Swallow: Within Functional Limits           (Please note: Silent aspiration cannot be evaluated clinically. Videofluoroscopic Swallow Study is required to rule-out silent aspiration.)    Esophageal Phase of Swallow: Complaints consistent with possible esophageal involvement  Comments: d/w RN                  FOLLOW UP  Treatment Plan/Recommendations: No further inpatient SLP service warranted     Follow Up Needed (Documentation Required): No       Thank you for your referral.   If you have any questions, please contact AVIS Ordaz

## 2024-02-20 NOTE — CM/SW NOTE
Pt discussed in rounds and per RN, pt just received Valium and is not appropriate for assessment at this time. SW will f/u with pt to complete assessment when appropriate.     JAHAIRA Reyna  Discharge Planner

## 2024-02-20 NOTE — PROGRESS NOTES
[facility]  Progress Note    Mago Tovar Patient Status:  Inpatient    3/31/1985 MRN [id number]   [facility] 3NE-A Attending Marck Boone MD   Hosp Day # 1 PCP None Pcp     Subjective:  Continued inability to swallow, with burning in chest and throat.  Unable to tolerate tablets.      Current Facility-Administered Medications:     morphINE PF 2 MG/ML injection 2 mg, 2 mg, Intravenous, Q2H PRN    clonazePAM (KlonoPIN) tab 0.5 mg, 0.5 mg, Oral, BID    potassium chloride 40 mEq in 250mL sodium chloride 0.9% IVPB premix, 40 mEq, Intravenous, Once **FOLLOWED BY** potassium chloride 20 mEq/100mL IVPB premix 20 mEq, 20 mEq, Intravenous, Once    sertraline (Zoloft) tab 50 mg, 50 mg, Oral, Daily    amLODIPine (Norvasc) tab 10 mg, 10 mg, Oral, Daily    acetaminophen (Tylenol Extra Strength) tab 500 mg, 500 mg, Oral, Q4H PRN    melatonin tab 3 mg, 3 mg, Oral, Nightly PRN    glycerin-hypromellose- (Artifical Tears) 0.2-0.2-1 % ophthalmic solution 1 drop, 1 drop, Both Eyes, QID PRN    sodium chloride (Saline Mist) 0.65 % nasal solution 1 spray, 1 spray, Each Nare, Q3H PRN    heparin (Porcine) 5000 UNIT/ML injection 5,000 Units, 5,000 Units, Subcutaneous, 2 times per day    polyethylene glycol (PEG 3350) (Miralax) 17 g oral packet 17 g, 17 g, Oral, Daily PRN    sennosides (Senokot) tab 17.2 mg, 17.2 mg, Oral, Nightly PRN    bisacodyl (Dulcolax) 10 MG rectal suppository 10 mg, 10 mg, Rectal, Daily PRN    fleet enema (Fleet) 7-19 GM/118ML rectal enema 133 mL, 1 enema, Rectal, Once PRN    ondansetron (Zofran) 4 MG/2ML injection 4 mg, 4 mg, Intravenous, Q6H PRN    diazepam (Valium) 5 mg/mL injection 2.5 mg, 2.5 mg, Intravenous, Q6H PRN    sodium chloride 0.9% infusion, , Intravenous, Continuous    haloperidol lactate (Haldol) 5 MG/ML injection 0.5 mg, 0.5 mg, Intravenous, Q6H PRN    Past Medical History:   Diagnosis Date    Anxiety     Essential hypertension      Past Surgical History:    Procedure Laterality Date    APPENDECTOMY      REMOVAL GALLBLADDER      TOTAL ABDOM HYSTERECTOMY           Objective:  Blood pressure (!) 170/111, pulse 82, temperature 98 °F (36.7 °C), temperature source Oral, resp. rate (!) 28, weight 200 lb 11.2 oz (91 kg), SpO2 96%, not currently breastfeeding.      EXAM:  BP (!) 170/111 (BP Location: Left arm)   Pulse 82   Temp 98 °F (36.7 °C) (Oral)   Resp (!) 28   Wt 200 lb 11.2 oz (91 kg)   SpO2 96%   BMI 36.71 kg/m²   GENERAL: well developed, well nourished, in no apparent distress  HEENT: atraumatic, normocephalic  CHEST: no chest tenderness  LUNGS: clear to auscultation  CARDIO: RRR without murmur  GI: good BS's and no masses, HSM or tenderness  EXTREMITIES: no cyanosis, clubbing or edema      Labs:   Lab Results   Component Value Date    WBC 6.5 02/20/2024    HGB 12.1 02/20/2024    HCT 38.7 02/20/2024    .0 02/20/2024    CREATSERUM 0.67 02/20/2024    BUN 13 02/20/2024     02/20/2024    K 2.9 02/20/2024    K 2.9 02/20/2024     02/20/2024    CO2 24.0 02/20/2024    GLU 95 02/20/2024    CA 8.4 02/20/2024    MG 2.1 02/20/2024     PROCEDURE:  XR UGI W/ESOPHOGRAM (AC STOMACH) (CPT=74246)     TECHNIQUE:  An air contrast upper gastrointestinal series was performed in the usual manner.  No  abdominal radiograph was performed.     COMPARISON:  None.     INDICATIONS:  Dysphagia     PATIENT STATED HISTORY: (As transcribed by Technologist)  Patient states has trouble swallowing food for about 2 weeks, feels like it gets stuck in chest area.  Had a history of hiatial hernia and had a GI study done yesterday.      FLUOROSCOPY IMAGES OBTAINED:  26  FLUOROSCOPY TIME:  4 minutes 3 seconds  RADIATION DOSE (AIR KERMA PRODUCT):  7493.8uGy/m2     FINDINGS:    There are tertiary contractions noted within the esophagus suggestive of dysmotility.  There is a limited evaluation of the mucosal pattern of the esophagus due to difficulty tolerating effervescent  crystals.  There is a small to moderate hiatal hernia  likely present with gastric folds seen above the diaphragm.  This is difficult to measure but measures in the range of 7 cm.  Spontaneous gastroesophageal reflux was noted to the level of the upper thoracic esophagus.     Mucosal pattern of the visualized stomach is unremarkable.  The duodenal bulb and sweep are unremarkable.  There is a somewhat limited evaluation of the mucosal pattern of the duodenal bulb.                     Impression:     CONCLUSION:  There is a small to moderate hiatal hernia present.  There is spontaneous gastroesophageal reflux to the level of the upper thoracic esophagus.     Tertiary contractions within the esophagus suggestive of dysmotility.        LOCATION:  [facility]        Dictated by (CST): Dale Floyd MD on 2/20/2024 at 10:24 AM      Finalized by (CST): Dale Floyd MD on 2/20/2024 at 10:26 AM         PROCEDURE:  CT ABDOMEN+PELVIS (CONTRAST ONLY) (CPT=74177)     COMPARISON:  None.     INDICATIONS:  abdominal pain and bloating     TECHNIQUE:  CT scanning was performed from the dome of the diaphragm to the pubic symphysis with non-ionic intravenous contrast material. Post contrast coronal MPR imaging was performed.  Dose reduction techniques were used. Dose information is  transmitted to the ACR (American College of Radiology) NRDR (National Radiology Data Registry) which includes the Dose Index Registry.     PATIENT STATED HISTORY:(As transcribed by Technologist)  Patient reports having severe generalized abdominal pain and nausea/vomiting for one week. Patient has difficulty keeping any food down      CONTRAST USED:  100cc of Isovue 370     FINDINGS:    LIVER:  No enlargement, atrophy, abnormal density, or significant focal lesion.    BILIARY:  Cholecystectomy.  No visible dilatation or calcification.    PANCREAS:  No lesion, fluid collection, ductal dilatation, or atrophy.    SPLEEN:  No enlargement or focal lesion.    KIDNEYS:  No  mass, obstruction, or calcification.    ADRENALS:  No mass or enlargement.    AORTA/VASCULAR:  No aneurysm or dissection.    RETROPERITONEUM:  No mass or adenopathy.    BOWEL/MESENTERY:  Small hiatal hernia with diffuse esophageal wall thickening involving the visualized esophagus, may represent reflux esophagitis.  Normal caliber small and large bowel.  ABDOMINAL WALL:  No mass or hernia.    URINARY BLADDER:  No visible focal wall thickening, lesion, or calculus.    PELVIC NODES:  No adenopathy.    PELVIC ORGANS:  2.2 cm left adnexal cyst most likely ovarian in origin.  No free fluid.    BONES:  No bony lesion or fracture.    LUNG BASES:  No visible pulmonary or pleural disease.                     Impression   CONCLUSION:    1. Small hiatal hernia with diffuse esophageal wall thickening involving the visualized esophagus may represent reflux esophagitis, correlate clinically.        LOCATION:  [facility]        Dictated by (CST): Lis Willingham MD on 2/19/2024 at 4:35 PM      Finalized by (CST): Lis Willingham MD on 2/19/2024 at 4:39 PM       Assessment:     Heartburn   Chest pain   Dysphagia    Ct abdomen and pelvis negative for intraabdominal pathology, other than esophageal thickening.  The esophagram shows gastroesophageal reflux with hiatal hernia and tertiary contractions.  Pt's presentation most likely related to poorly treated gastroesophageal reflux with esophageal spasms.  However, achalasia, particularly spastic achalasia (type 3) is still a possibility.    Plan:  1. Protonix 40 mg iv bid and will add carafate 1 gm po tid.  2. Esophageal motility study tomorrow to assess for achalasia.  3. Clear liquid diet.    Efraín Hart MD  2/20/2024  12:05 PM

## 2024-02-20 NOTE — PROGRESS NOTES
[facility]     Hospitalist Progress Note     Mago Tovar Patient Status:  Inpatient    3/31/1985 MRN [id number]   Location [facility] 3NE-A Attending Marck Boone MD   Hosp Day # 1 PCP None Pcp     Chief Complaint: Chest pain    Subjective:     Patient still having severe anxiety, this leads to her to feel chest pain and have difficulty swallowing.  She also endorses depressive episodes but no SI/HI.  No other acute complaints.     Objective:    Review of Systems:   A comprehensive review of systems was completed; pertinent positive and negatives stated in subjective.    Vital signs:  Temp:  [98 °F (36.7 °C)-98.6 °F (37 °C)] 98 °F (36.7 °C)  Pulse:  [70-90] 82  Resp:  [13-28] 28  BP: (112-170)/() 170/111  SpO2:  [92 %-98 %] 96 %    Physical Exam:    General: No acute distress, pleasant   Respiratory: No wheezes, no rhonchi  Cardiovascular: S1, S2, regular rate and rhythm  Abdomen: Soft, Non-tender, non-distended, positive bowel sounds  Neuro: No new focal deficits.   Extremities: No edema    Diagnostic Data:    Labs:  Recent Labs   Lab 24  0538 24  1034   WBC 9.6 6.5   HGB 14.2 12.1   MCV 81.0 81.6   .0 231.0       Recent Labs   Lab 24  0538 24  1034   * 95   BUN 13 13   CREATSERUM 0.98 0.67   CA 10.2* 8.4*   ALB 4.1  --     140   K 3.3* 2.9*  2.9*    109   CO2 24.0 24.0   ALKPHO 106*  --    AST 17  --    ALT 19  --    BILT 0.3  --    TP 9.0*  --        Estimated Creatinine Clearance: 88.3 mL/min (based on SCr of 0.67 mg/dL).    Recent Labs   Lab 24  0538 24  1500   TROPHS 6 9       No results for input(s): \"PTP\", \"INR\" in the last 168 hours.               Microbiology    No results found for this visit on 24.      Imaging: Reviewed in Epic.    Medications:    clonazePAM  0.5 mg Oral BID    potassium chloride  40 mEq Intravenous Once    Followed by    potassium chloride  20 mEq Intravenous Once    pantoprazole  40  mg Oral BID AC    sertraline  50 mg Oral Daily    amLODIPine  10 mg Oral Daily    heparin  5,000 Units Subcutaneous 2 times per day       Assessment & Plan:      #Chest pain  EKG sinus tachycardia  CXR clear  Trop 6 -> 9  Dimer 0.45, TSH 1.41  Likely anxiety related, noncardiac      #Abdominal pain/bloating  EGD with with possible achalasia vs eosinophilic esophagitis  Biopsy sent  Ct abd without acute findings  Upper GI -> dysmotility   Further workup per GI     #Anxiety/Panic attacks  Resume home sertraline, prn diazepam   Will add Klonopin for better control  If patient continues to have severe panic attacks, will add psych consult      #Essential HTN- resume amlodipine   #Hypokalemia - replete and trend       Ney Blackmon MD    Supplementary Documentation:     Quality:  DVT Mechanical Prophylaxis:     Early ambuation  DVT Pharmacologic Prophylaxis   Medication    heparin (Porcine) 5000 UNIT/ML injection 5,000 Units                Code Status: Not on file  Huggins: No urinary catheter in place  Huggins Duration (in days):   Central line:    NADER:     Discharge is dependent on: clinical status  At this point Ms. Tovar is expected to be discharge to: Home    The 21st Century Cures Act makes medical notes like these available to patients in the interest of transparency. Please be advised this is a medical document. Medical documents are intended to carry relevant information, facts as evident, and the clinical opinion of the practitioner. The medical note is intended as peer to peer communication and may appear blunt or direct. It is written in medical language and may contain abbreviations or verbiage that are unfamiliar.

## 2024-02-20 NOTE — PLAN OF CARE
Assumed patient care at 1930.  RA, VSS, Nsr on tele.  AxOx4  PRN haldol given for nausea per MAR  PRN valium given for anxiety per MAR  XR micrograms/Esophagus planned for 2/20 NPO 4 hours before procedure  0.9 NS infusing at 83ml/hr into RAC PIV  Bed in lowest position.  Bed alarm on.  Needs met at this time.    0435- Pt experiencing increased anxiety, hyperventilating, and reporting numbness and tingling after receiving PRN morphine for pain. VSS. PRN valium given with good response and pt resting comfortably. MD notified. No new orders.    Problem: GASTROINTESTINAL - ADULT  Goal: Minimal or absence of nausea and vomiting  Description: INTERVENTIONS:  - Maintain adequate hydration with IV or PO as ordered and tolerated  - Nasogastric tube to low intermittent suction as ordered  - Evaluate effectiveness of ordered antiemetic medications  - Provide nonpharmacologic comfort measures as appropriate  - Advance diet as tolerated, if ordered  - Obtain nutritional consult as needed  - Evaluate fluid balance  Outcome: Progressing  Goal: Maintains or returns to baseline bowel function  Description: INTERVENTIONS:  - Assess bowel function  - Maintain adequate hydration with IV or PO as ordered and tolerated  - Evaluate effectiveness of GI medications  - Encourage mobilization and activity  - Obtain nutritional consult as needed  - Establish a toileting routine/schedule  - Consider collaborating with pharmacy to review patient's medication profile  Outcome: Progressing

## 2024-02-21 PROCEDURE — 99233 SBSQ HOSP IP/OBS HIGH 50: CPT | Performed by: HOSPITALIST

## 2024-02-21 PROCEDURE — 4A0B78Z MEASUREMENT OF GASTROINTESTINAL MOTILITY, VIA NATURAL OR ARTIFICIAL OPENING: ICD-10-PCS | Performed by: INTERNAL MEDICINE

## 2024-02-21 RX ORDER — HYDRALAZINE HYDROCHLORIDE 20 MG/ML
10 INJECTION INTRAMUSCULAR; INTRAVENOUS EVERY 6 HOURS PRN
Status: DISCONTINUED | OUTPATIENT
Start: 2024-02-21 | End: 2024-02-23

## 2024-02-21 RX ORDER — LORAZEPAM 2 MG/ML
0.5 INJECTION INTRAMUSCULAR ONCE
Status: COMPLETED | OUTPATIENT
Start: 2024-02-21 | End: 2024-02-21

## 2024-02-21 RX ORDER — LIDOCAINE HYDROCHLORIDE 20 MG/ML
JELLY TOPICAL
Status: DISCONTINUED | OUTPATIENT
Start: 2024-02-21 | End: 2024-02-22 | Stop reason: HOSPADM

## 2024-02-21 RX ORDER — LORAZEPAM 2 MG/ML
1 INJECTION INTRAMUSCULAR EVERY 6 HOURS PRN
Status: DISCONTINUED | OUTPATIENT
Start: 2024-02-21 | End: 2024-02-23

## 2024-02-21 NOTE — PROCEDURES
[address] [address]  ?  02/21/2024  ?  Re: Mago Tovar  ?  To Whom It May Concern:    Mago Tovar was admitted to [facility] from 2/19/2024 and remains inpatient at this time.     Please excuse Mago Tovar from attending work for these days.    Further restrictions and work excuse will be based on clinical course.  ?  Thank you,    Aelxx Victoria MD, MD  [facility] Hospitalist

## 2024-02-21 NOTE — PLAN OF CARE
Assumed pt care this morning at 0730.  Pt is A&Ox4, following commands.   Pt on room air, VSS.  NSR on tele.   Pt denies pain.  Pt up ad cheyanne.  Pt on regular diet.    L forearm  saline locked.    Bed in lowest position, call light in reach.      Problem: GASTROINTESTINAL - ADULT  Goal: Minimal or absence of nausea and vomiting  Description: INTERVENTIONS:  - Maintain adequate hydration with IV or PO as ordered and tolerated  - Nasogastric tube to low intermittent suction as ordered  - Evaluate effectiveness of ordered antiemetic medications  - Provide nonpharmacologic comfort measures as appropriate  - Advance diet as tolerated, if ordered  - Obtain nutritional consult as needed  - Evaluate fluid balance  Outcome: Progressing  Goal: Maintains or returns to baseline bowel function  Description: INTERVENTIONS:  - Assess bowel function  - Maintain adequate hydration with IV or PO as ordered and tolerated  - Evaluate effectiveness of GI medications  - Encourage mobilization and activity  - Obtain nutritional consult as needed  - Establish a toileting routine/schedule  - Consider collaborating with pharmacy to review patient's medication profile  Outcome: Progressing

## 2024-02-21 NOTE — OPERATIVE REPORT
PATIENT NAME: Mago Tovar  MRN: [id number]  DATE OF OPERATION: 2/21/2024  REFERRING PHYSICIAN: Dr. Victoria  PREOPERATIVE DIAGNOSIS                Dysphagia       Chest pain      Abnormal EGD and esophagram  POSTOPERATIVE DIAGNOSIS:  Manometric changes consistent with achalasia, type II.  Pan-hypopressurization of 100% of the esophageal contractions.  Elevated basal IRP / LES pressure at 59.  Residual pressure was 8, though this seems to me artifactually low related to movement of the LES pressure probe into the hiatal hernia sac.  PROCEDURE PERFORMED:               Esophageal motility study  Endoscopist:                                             Efraín Hart MD     PROCEDURE AND FINDINGS: The patient underwent a high resolution manometry by the nursing staff.  Ten wet swallows were analyzed.  I personally reviewed the study images and data collection on swallowing characteristics.  The current data was collected on the 10 wet swallows analyzed:  Lower sphincter assessment:  1. Hiatal hernia  - yes     2. Length of LES - 3 cm  3. LES basal IRP:  59.4 mm Hg (mean) - normal 13 - 43.      LES residual IRP 8.1 mm Hg (median)  normal < 15. (This seems to me artifactually low related to movement of the LES pressure probe into the hiatal hernia sac with respirations).  Esophageal motility:  1. Number of swallow evaluated - 10.   Peristaltic 0%   Premature 0 %   Failed 100%   Panesophageal pressurization 100%  2. High resolution paramenters:   Distal latency                   5.7 (normal)   Distal contractile intergral  1116 (500 - 5000)   Contractile front velocity   4.1  (< 9.0)    Impression:  Achalasia, type II    RECOMMENDATIONS         1. Therapeutic options discussed with the pt.  As she is unable to swallow, will proceed with further evaluation for achalasia balloon dilation vs POEM procedure.        2. Dr. Roa to evaluate and manage therapeutic options for pt on 2/22, as I do not perform POEM or  achalasia dilations.    Efraín Hart MD, Gastroenterologist  Cc: Dr. Victoria

## 2024-02-21 NOTE — PLAN OF CARE
Assumed care of patient at 1930  A&O X4, RA, MARCIA.   Sr on tele.   Potassium bag completed.  Medicated with iv morphine for reports of abdominal and chest pain, and effective.   Scheduled  Carafate given, and tolerated well without any concerns.   Patient tolerating ice chips with no concerns.  Ambulates to bathroom, steady gait noted with ambulation.   Staff rounding maintained for patient's needs.    Plan for motility study with GI today.       Problem: GASTROINTESTINAL - ADULT  Goal: Minimal or absence of nausea and vomiting  Description: INTERVENTIONS:  - Maintain adequate hydration with IV or PO as ordered and tolerated  - Nasogastric tube to low intermittent suction as ordered  - Evaluate effectiveness of ordered antiemetic medications  - Provide nonpharmacologic comfort measures as appropriate  - Advance diet as tolerated, if ordered  - Obtain nutritional consult as needed  - Evaluate fluid balance  Outcome: Progressing  Goal: Maintains or returns to baseline bowel function  Description: INTERVENTIONS:  - Assess bowel function  - Maintain adequate hydration with IV or PO as ordered and tolerated  - Evaluate effectiveness of GI medications  - Encourage mobilization and activity  - Obtain nutritional consult as needed  - Establish a toileting routine/schedule  - Consider collaborating with pharmacy to review patient's medication profile  Outcome: Progressing

## 2024-02-21 NOTE — PAYOR COMM NOTE
--------------  ADMISSION REVIEW     Payor: [facility]  Subscriber #:  [id number]  Authorization Number: [id number]    Admit date: 2/19/24  Admit time: 11:24 AM       REVIEW DOCUMENTATION:     ED Provider Notes        ED Provider Notes signed by Marck Boone MD at 2/19/2024  8:56 AM       Author: Marck Boone MD Service: -- Author Type: Physician    Filed: 2/19/2024  8:56 AM Date of Service: 2/19/2024  6:23 AM Status: Signed    : Marck Boone MD (Physician)             History     Chief Complaint   Patient presents with    Chest Pain Angina    Anxiety/Panic attack       HPI    38 year old female with history of anxiety, hypertension presents for evaluation.  Patient states for couple weeks now she has had significant anxiety, having frequent panic attacks with feeling dyspnea, tingling, sense of doom, unable to sleep, racing heart rate.  She was previously on sertraline and diazepam but she stopped taking them a few months ago.  She is worsening symptoms this morning, she feels like there is a pain/blockage in her throat and she is having difficulty swallowing where she vomits things up now.  She had roast for dinner last night, she is unsure if she feels like there is something stuck in her throat          Past Medical History:   Diagnosis Date    Anxiety     Essential hypertension        Past Surgical History:   Procedure Laterality Date    APPENDECTOMY      REMOVAL GALLBLADDER      TOTAL ABDOM HYSTERECTOMY         Social History     Socioeconomic History    Marital status: Single   Tobacco Use    Smoking status: Never   Vaping Use    Vaping Use: Never used   Substance and Sexual Activity    Alcohol use: Yes     Comment: social    Drug use: Never                   Physical Exam     ED Triage Vitals [02/19/24 0528]   /83   Pulse 120   Resp 20   Temp 99.4 °F (37.4 °C)   Temp src Temporal   SpO2 95 %   O2 Device None (Room air)       Physical Exam  Constitutional:        General: She is in acute distress.   HENT:      Mouth/Throat:      Pharynx: No oropharyngeal exudate or posterior oropharyngeal erythema.   Eyes:      Extraocular Movements: Extraocular movements intact.   Cardiovascular:      Rate and Rhythm: Tachycardia present.      Pulses: Normal pulses.   Pulmonary:      Effort: Pulmonary effort is normal. No respiratory distress.   Abdominal:      General: Abdomen is flat. There is no distension.      Tenderness: There is no abdominal tenderness. There is no guarding.   Musculoskeletal:         General: No swelling or deformity.      Cervical back: Normal range of motion.   Skin:     General: Skin is warm.   Neurological:      General: No focal deficit present.      Mental Status: She is alert.   Psychiatric:         Mood and Affect: Mood is anxious.              ED Course     Labs Reviewed   COMP METABOLIC PANEL (14) - Abnormal; Notable for the following components:       Result Value    Glucose 103 (*)     Potassium 3.3 (*)     Calcium, Total 10.2 (*)     Alkaline Phosphatase 106 (*)     Total Protein 9.0 (*)     Globulin  4.9 (*)     A/G Ratio 0.8 (*)     All other components within normal limits   CBC W/ DIFFERENTIAL - Abnormal; Notable for the following components:    RBC 5.47 (*)     Lymphocyte Absolute 4.10 (*)     All other components within normal limits   TROPONIN I HIGH SENSITIVITY - Normal   D-DIMER - Normal   TSH W REFLEX TO FREE T4 - Normal   SARS-COV-2/FLU A AND B/RSV BY PCR (GENEXPERT) - Normal    Narrative:     This test is intended for the qualitative detection and differentiation of SARS-CoV-2, influenza A, influenza B, and respiratory syncytial virus (RSV) viral RNA in nasopharyngeal or nares swabs from individuals suspected of respiratory viral infection consistent with COVID-19 by their healthcare provider. Signs and symptoms of respiratory viral infection due to SARS-CoV-2, influenza, and RSV can be similar.    Test performed using the Xpert Xpress  SARS-CoV-2/FLU/RSV (real time RT-PCR)  assay on the GeneXpert instrument, [employer], [place] [address].   This test is being used under the Food and Drug Administration's Emergency Use Authorization.    The authorized Fact Sheet for Healthcare Providers for this assay is available upon request from the laboratory.   CBC WITH DIFFERENTIAL WITH PLATELET    Narrative:     The following orders were created for panel order CBC With Differential With Platelet.  Procedure                               Abnormality         Status                     ---------                               -----------         ------                     CBC W/ DIFFERENTIAL[[id number]]          Abnormal            Final result                 Please view results for these tests on the individual orders.   TROPONIN I HIGH SENSITIVITY   RAINBOW DRAW LAVENDER   RAINBOW DRAW LIGHT GREEN   RAINBOW DRAW BLUE   RAINBOW DRAW GOLD     XR CHEST AP PORTABLE  (CPT=71045)    Result Date: 2/19/2024  CONCLUSION:  Normal examination for a patient of this age.    LOCATION:  [facility]      Dictated by (CST): Odilon Lowry MD on 2/19/2024 at 8:01 AM     Finalized by (CST): Odilon Lowry MD on 2/19/2024 at 8:02 AM            MDM     Vitals:    02/19/24 0528 02/19/24 0645 02/19/24 0745 02/19/24 0830   BP: 111/83   (!) 150/95   Pulse: 120 114 103 87   Resp: 20 15     Temp: 99.4 °F (37.4 °C)      TempSrc: Temporal      SpO2: 95% 96% 95% 99%       Anxiety/panic, electro abnormalities, viral syndrome, esophageal food impaction, PE on differential  Give fluids, antiemetics and anxiety control    ED Course as of 02/19/24 0856  ------------------------------------------------------------  Time: 02/19 0602  Comment: EKG interpretation by me: EKG sinus rhythm at a rate of 127, axis nomal, no concerning acute ischemic ST changes  ------------------------------------------------------------  Time: 02/19 0701  Comment: CXR interpretation by me with no concerning acute  findings    ------------------------------------------------------------  Time: 02/19 0717  Comment: Troponin negative, dimer normal.  Thyroid levels within normal limits.  Labs otherwise with reassuring renal function and hemoglobin.  ------------------------------------------------------------  Time: 02/19 0805  Comment: Upon reevaluation patient is having no improvement, attempting oral intake with immediate regurgitation or vomiting.  Concern for food esophageal impaction.  Will consult with GI         Disposition and Plan     Clinical Impression:  1. Esophageal obstruction due to food impaction    2. Panic attack        Disposition:  Send to or/cath/gi    Follow-up:  No follow-up provider specified.    Medications Prescribed:  Current Discharge Medication List                Signed by Marck Boone MD on 2/19/2024  8:56 AM         MEDICATIONS ADMINISTERED IN LAST 1 DAY:  diazepam (Valium) 5 mg/mL injection 2.5 mg       Date Action Dose Route User    2/20/2024 1204 Given 2.5 mg Intravenous Chrissy Escoto RN          diazepam (Valium) 5 mg/mL injection 5 mg       Date Action Dose Route User    2/21/2024 0158 Given 5 mg Intravenous Anusha Noonan RN    2/20/2024 1803 Given 5 mg Intravenous Chrissy Escoto RN          heparin (Porcine) 5000 UNIT/ML injection 5,000 Units       Date Action Dose Route User    2/20/2024 2109 Given 5,000 Units Subcutaneous (Right Lower Abdomen) Anusha Noonan RN          lidocaine 2% topical sterile jelly       Date Action Dose Route User    2/21/2024 0836 Given 1 Application Topical (Left Naris) Juanita Brand RN    2/21/2024 0813 Given 1 Application Topical (Right Naris) Juanita Brand RN          morphINE PF 2 MG/ML injection 2 mg       Date Action Dose Route User    2/21/2024 0616 Given 2 mg Intravenous Anusha Noonan RN    2/20/2024 2126 Given 2 mg Intravenous Anusha Noonan, KOTA          pantoprazole (Protonix) 40 mg in sodium chloride 0.9% PF 10 mL IV push       Date Action Dose  Route User    2/21/2024 0200 Given 40 mg Intravenous Anusha Noonan RN    2/20/2024 1802 Given 40 mg Intravenous Chrissy Escoto RN          potassium chloride 20 mEq/100mL IVPB premix 20 mEq       Date Action Dose Route User    2/20/2024 1802 New Bag 20 mEq Intravenous Chrissy Escoto RN          potassium chloride 40 mEq in 250mL sodium chloride 0.9% IVPB premix       Date Action Dose Route User    2/20/2024 1253 New Bag 40 mEq Intravenous Chrissy Escoto RN          sodium chloride 0.9% infusion       Date Action Dose Route User    2/20/2024 1805 New Bag (none) Intravenous Chrissy Escoto RN          sucralfate (Carafate) 1 GM/10ML oral suspension 1 g       Date Action Dose Route User    2/21/2024 0542 Given 1 g Oral Anusha Noonan RN    2/20/2024 2126 Given 1 g Oral Anusha Noonan RN            Vitals (last day)       Date/Time Temp Pulse Resp BP SpO2 Weight O2 Device O2 Flow Rate (L/min) [facility]    02/21/24 0812 -- 86 18 164/105 100 % -- None (Room air) -- EP    02/21/24 0710 98.5 °F (36.9 °C) -- -- -- 97 % -- None (Room air) 0 L/min KO    02/21/24 0710 -- 83 18 153/100 -- -- -- -- [facility]    02/21/24 0700 97.9 °F (36.6 °C) 86 23 160/110 95 % -- None (Room air) -- SB    02/20/24 2327 98.2 °F (36.8 °C) 74 11 158/99 96 % -- None (Room air) -- SB    02/20/24 1911 98.5 °F (36.9 °C) 83 19 154/97 -- -- None (Room air) -- SB    02/20/24 1625 98 °F (36.7 °C) 98 16 154/81 100 % -- -- -- WW    02/20/24 1145 98.2 °F (36.8 °C) 90 20 162/85 95 % -- -- -- WW    02/20/24 0740 98.2 °F (36.8 °C) 75 16 158/86 93 % -- -- -- WW    02/20/24 0430 98 °F (36.7 °C) 82 28 170/111 96 % -- None (Room air) -- SB     2/19 GI CONSULT NOTE    Reason for Consultation:  Dysphagia  GERD  Esophageal obstruction     History of Present Illness:  Mago Tovar is a a(n) 38 year old female with hx of anxiety and panic disorder, and gerd, consult requested for dysphagia with vomiting and apparent esophageal obstruction.  Pt seen in ER  overnight with panic attacks.   Once panic attack resolved, pt with sense of inability to swallow, unable to drink water without vomiting.  Symptoms started after eating roast beef last bm.                   Pt seen by [facility] - Dr. Boyd for similar complaints - chronic nausea and vomiting, and heartburn.  Pt has been tried on multiple PPIs and nortriptyline in the past.               EGD  9/2022 showed  h pylori gastritis - rx'ed with antibiotics , colonoscopy 9/2022 - poor prep    EGD Operative Report    2/19 GI OP NOTE  Case Time: Procedures: Surgeons:   2/19/2024 10:09 AM ESOPHAGOGASTRODUODENOSCOPY (EGD) WITH BIOPSIES    Efraín Hart MD               Signed         PATIENT NAME: Mago Tovar  MRN: [id number]  DATE OF OPERATION:  2/19/24  REFERRING PHYSICIAN: Dr. Boone  Medications:  MAC  PREOPERATIVE DIAGNOSIS             Dysphagia             Chest and abdominal pain             GERD  POSTOPERATIVE DIAGNOSIS:  Circumferential ulcerations at the GE junction.  Some retained fluid in the esophagus without retained solid material.  No stricture noted.    The lower esophageal sphincter area was tight, without stricture, and required pressure to pass the GE junction, suspicious for achalasia.  Biopsies of the esophagus were done to assess for eosinophilic esophagitis.   Normal stomach.  Biopsies were done to assess for h pylori infection, given prior hx of h pylori infection.  Normal duodenum.     PROCEDURE PERFORMED:                Esophagogastroduodenoscopy with biopsies   Endoscopist:  Efraín Hart MD       RECOMMENDATIONS   Pt to be admitted for further evaluation of abdominal pain.  Ct abdomen and pelvis with contrast ordered.  Full liquid diet.  Pending ct abdomen and pelvis results, consider esophageal motility study vs esophagram to further assess for achalasia.  2/19 H&P  History of Present Illness:      Mago Tovar is a 38 year old female with past medical history of hypertension and  anxiety who presented to the emergency department with chest pains.  Patient with multiple complaints, states she has been having chest pain, abdominal pain, and feeling like something was stuck in her throat when swallowing.  Patient does have severe anxiety, she says she stopped taking her sertraline about 2 months ago because she felt like her symptoms were better controlled.  About 1 month ago is when she began having worsening anxiety.  She has over the last 2 to 3 days has been having persistent panic attacks has been occurring every couple of hours.  Today she began having chest discomfort, and symptoms already noted above.  Patient was seen after EGD, she still anxious about what is going on.  She denies any fevers, no cough, no shortness of breath, no other acute complaints.          Assessment & Plan:   #Chest pain  EKG sinus tachycardia  CXR clear  Trop 6, will trend  Dimer 0.45, TSH 1.41  Monitor on telemetry      #Abdominal pain/bloating  EGD with with possible achalasia vs eosinophilic esophagitis  Biopsy sent  Ct abd today  GI following     #Anxiety/Panic attacks  Resume home sertraline, prn diazepam      #Essential HTN- resume amlodipine            Plan of care discussed with patient, er physician, nurse     Ney Blackmon MD                 2/20 HOSPITALIST NOTE    Subjective:   Patient still having severe anxiety, this leads to her to feel chest pain and have difficulty swallowing.  She also endorses depressive episodes but no SI/HI.  No other acute complaints.      Vital signs:  Temp:  [98 °F (36.7 °C)-98.6 °F (37 °C)] 98 °F (36.7 °C)  Pulse:  [70-90] 82  Resp:  [13-28] 28  BP: (112-170)/() 170/111  SpO2:  [92 %-98 %] 96 %         Assessment & Plan:   #Chest pain  EKG sinus tachycardia  CXR clear  Trop 6 -> 9  Dimer 0.45, TSH 1.41  Likely anxiety related, noncardiac      #Abdominal pain/bloating  EGD with with possible achalasia vs eosinophilic esophagitis  Biopsy sent  Ct abd without acute  findings  Upper GI -> dysmotility   Further workup per GI     #Anxiety/Panic attacks  Resume home sertraline, prn diazepam   Will add Klonopin for better control  If patient continues to have severe panic attacks, will add psych consult      #Essential HTN- resume amlodipine   #Hypokalemia - replete and trend      2/20 GI NOTE     Subjective:  Continued inability to swallow, with burning in chest and throat.  Unable to tolerate tablets.        Objective:  Blood pressure (!) 170/111, pulse 82, temperature 98 °F (36.7 °C), temperature source Oral, resp. rate (!) 28, weight 200 lb 11.2 oz (91 kg), SpO2 96%,      Labs:         Lab Results   Component Value Date     WBC 6.5 02/20/2024     HGB 12.1 02/20/2024     HCT 38.7 02/20/2024     .0 02/20/2024     CREATSERUM 0.67 02/20/2024     BUN 13 02/20/2024      02/20/2024     K 2.9 02/20/2024     K 2.9 02/20/2024      02/20/2024     CO2 24.0 02/20/2024     GLU 95 02/20/2024     CA 8.4 02/20/2024     MG 2.1 02/20/2024       Assessment:     Heartburn   Chest pain   Dysphagia                Ct abdomen and pelvis negative for intraabdominal pathology, other than esophageal thickening.  The esophagram shows gastroesophageal reflux with hiatal hernia and tertiary contractions.  Pt's presentation most likely related to poorly treated gastroesophageal reflux with esophageal spasms.  However, achalasia, particularly spastic achalasia (type 3) is still a possibility.     Plan:  1. Protonix 40 mg iv bid and will add carafate 1 gm po tid.  2. Esophageal motility study tomorrow to assess for achalasia.  3. Clear liquid diet.

## 2024-02-22 ENCOUNTER — ANESTHESIA EVENT (OUTPATIENT)
Dept: ENDOSCOPY | Facility: HOSPITAL | Age: 39
End: 2024-02-22
Payer: MEDICAID

## 2024-02-22 ENCOUNTER — ANESTHESIA (OUTPATIENT)
Dept: ENDOSCOPY | Facility: HOSPITAL | Age: 39
End: 2024-02-22
Payer: MEDICAID

## 2024-02-22 LAB
ANION GAP SERPL CALC-SCNC: 4 MMOL/L (ref 0–18)
BASOPHILS # BLD AUTO: 0.04 X10(3) UL (ref 0–0.2)
BASOPHILS NFR BLD AUTO: 0.6 %
BUN BLD-MCNC: 8 MG/DL (ref 9–23)
CALCIUM BLD-MCNC: 8.7 MG/DL (ref 8.5–10.1)
CHLORIDE SERPL-SCNC: 110 MMOL/L (ref 98–112)
CO2 SERPL-SCNC: 23 MMOL/L (ref 21–32)
CREAT BLD-MCNC: 0.81 MG/DL
EGFRCR SERPLBLD CKD-EPI 2021: 94 ML/MIN/1.73M2 (ref 60–?)
EOSINOPHIL # BLD AUTO: 0.17 X10(3) UL (ref 0–0.7)
EOSINOPHIL NFR BLD AUTO: 2.6 %
ERYTHROCYTE [DISTWIDTH] IN BLOOD BY AUTOMATED COUNT: 14.2 %
GLUCOSE BLD-MCNC: 104 MG/DL (ref 70–99)
HCT VFR BLD AUTO: 40.7 %
HGB BLD-MCNC: 13.3 G/DL
IMM GRANULOCYTES # BLD AUTO: 0.02 X10(3) UL (ref 0–1)
IMM GRANULOCYTES NFR BLD: 0.3 %
LYMPHOCYTES # BLD AUTO: 2.62 X10(3) UL (ref 1–4)
LYMPHOCYTES NFR BLD AUTO: 39.9 %
MCH RBC QN AUTO: 25.9 PG (ref 26–34)
MCHC RBC AUTO-ENTMCNC: 32.7 G/DL (ref 31–37)
MCV RBC AUTO: 79.2 FL
MONOCYTES # BLD AUTO: 0.33 X10(3) UL (ref 0.1–1)
MONOCYTES NFR BLD AUTO: 5 %
NEUTROPHILS # BLD AUTO: 3.38 X10 (3) UL (ref 1.5–7.7)
NEUTROPHILS # BLD AUTO: 3.38 X10(3) UL (ref 1.5–7.7)
NEUTROPHILS NFR BLD AUTO: 51.6 %
OSMOLALITY SERPL CALC.SUM OF ELEC: 283 MOSM/KG (ref 275–295)
PLATELET # BLD AUTO: 279 10(3)UL (ref 150–450)
POTASSIUM SERPL-SCNC: 3.4 MMOL/L (ref 3.5–5.1)
RBC # BLD AUTO: 5.14 X10(6)UL
SODIUM SERPL-SCNC: 137 MMOL/L (ref 136–145)
WBC # BLD AUTO: 6.6 X10(3) UL (ref 4–11)

## 2024-02-22 PROCEDURE — 99231 SBSQ HOSP IP/OBS SF/LOW 25: CPT | Performed by: HOSPITALIST

## 2024-02-22 PROCEDURE — 0D758ZZ DILATION OF ESOPHAGUS, VIA NATURAL OR ARTIFICIAL OPENING ENDOSCOPIC: ICD-10-PCS | Performed by: INTERNAL MEDICINE

## 2024-02-22 PROCEDURE — 3E0G8GC INTRODUCTION OF OTHER THERAPEUTIC SUBSTANCE INTO UPPER GI, VIA NATURAL OR ARTIFICIAL OPENING ENDOSCOPIC: ICD-10-PCS | Performed by: INTERNAL MEDICINE

## 2024-02-22 RX ORDER — DIPHENHYDRAMINE HYDROCHLORIDE 50 MG/ML
12.5 INJECTION INTRAMUSCULAR; INTRAVENOUS EVERY 4 HOURS PRN
Status: DISCONTINUED | OUTPATIENT
Start: 2024-02-22 | End: 2024-02-23

## 2024-02-22 RX ORDER — LIDOCAINE HYDROCHLORIDE 10 MG/ML
INJECTION, SOLUTION EPIDURAL; INFILTRATION; INTRACAUDAL; PERINEURAL AS NEEDED
Status: DISCONTINUED | OUTPATIENT
Start: 2024-02-22 | End: 2024-02-22 | Stop reason: SURG

## 2024-02-22 RX ORDER — DIPHENHYDRAMINE HCL 25 MG
25 CAPSULE ORAL EVERY 4 HOURS PRN
Status: DISCONTINUED | OUTPATIENT
Start: 2024-02-22 | End: 2024-02-23

## 2024-02-22 RX ORDER — NALOXONE HYDROCHLORIDE 0.4 MG/ML
0.08 INJECTION, SOLUTION INTRAMUSCULAR; INTRAVENOUS; SUBCUTANEOUS ONCE AS NEEDED
Status: DISCONTINUED | OUTPATIENT
Start: 2024-02-22 | End: 2024-02-22 | Stop reason: HOSPADM

## 2024-02-22 RX ORDER — SODIUM CHLORIDE, SODIUM LACTATE, POTASSIUM CHLORIDE, CALCIUM CHLORIDE 600; 310; 30; 20 MG/100ML; MG/100ML; MG/100ML; MG/100ML
INJECTION, SOLUTION INTRAVENOUS CONTINUOUS
Status: DISCONTINUED | OUTPATIENT
Start: 2024-02-22 | End: 2024-02-23

## 2024-02-22 RX ORDER — ISOSORBIDE DINITRATE 5 MG/1
5 TABLET ORAL
Status: DISCONTINUED | OUTPATIENT
Start: 2024-02-22 | End: 2024-02-23

## 2024-02-22 RX ORDER — ONDANSETRON 2 MG/ML
4 INJECTION INTRAMUSCULAR; INTRAVENOUS ONCE AS NEEDED
Status: DISCONTINUED | OUTPATIENT
Start: 2024-02-22 | End: 2024-02-22 | Stop reason: HOSPADM

## 2024-02-22 RX ADMIN — SODIUM CHLORIDE: 9 INJECTION, SOLUTION INTRAVENOUS at 18:15:00

## 2024-02-22 RX ADMIN — LIDOCAINE HYDROCHLORIDE 10 MG: 10 INJECTION, SOLUTION EPIDURAL; INFILTRATION; INTRACAUDAL; PERINEURAL at 18:08:00

## 2024-02-22 NOTE — PROGRESS NOTES
Discharge Summary    NAME: Bailey King  :  1959  MRN:  [id number]    Admit date:  2023  Discharge date:  23    Advance Directive: Full Code    Consults: hematology/oncology and general surgery    Primary Care Physician:  KALPESH Nicholson CNP    Discharge Diagnoses:  Principal Problem:    SBO (small bowel obstruction) ([address]  Active Problems: Moderate malnutrition ([address]    Palliative care patient    Ileus ([address]    Cancer related pain  Abdominal pain    Significant Diagnostic Studies:   CT ABDOMEN PELVIS W IV CONTRAST Additional Contrast? None    Result Date: 2023  Exam: CT of the abdomen and pelvis with IV contrast Technique: Helical images were obtained through the abdomen and pelvis following the protocol administration of nonionic intravenous contrast material.No oral contrast was administered. Coronal and sagittal reconstructions were performed. Radiation dose optimization technique was utilized. Clinical information: Right-sided abdominal pain, history of colon cancer with metastasis. Comparison: Contrast-enhanced CT of the abdomen and pelvis performed 2022 Findings: Lung bases: Multiple small nodules in the right middle and lower lobes measuring up to 9 mm. These appear new when compared to prior study and are consistent with metastatic disease. Mesentery/peritoneum: No free intraperitoneal air. Small volume free fluid is visualized adjacent to the dewayne hepatis. Soft tissue peritoneal lesion visualized in the left hemiabdomen measuring 1.7 cm compatible with peritoneal implant. This appears larger in size when compared to prior study. Abdominal wall: Soft tissue lesion in the anterior abdominal wall superiorly and slightly to the right of midline measures 2.1 cm suggestive of metastasis. Liver: The patient is post partial right hepatectomy. Multiple hypoenhancing lesions are visualized throughout the liver the largest of which is in the inferior right hepatic lobe remnant [facility]     Hospitalist Progress Note     Mago Tovar Patient Status:  Inpatient    3/31/1985 MRN [id number]   Location [facility] 3NE-A Attending Marck Boone MD   Hosp Day # 3 PCP None Pcp     Chief Complaint: Chest pain     Subjective:   Patient states anxiety unchanged. Admits to abdominal pain.     Current medications:   potassium chloride  40 mEq Intravenous Once    isosorbide dinitrate  5 mg Oral TID (Nitrates)    [Held by provider] clonazePAM  0.5 mg Oral BID    sucralfate  1 g Oral TID AC and HS    pantoprazole  40 mg Intravenous Q12H    [Held by provider] sertraline  50 mg Oral Daily    amLODIPine  10 mg Oral Daily    [Held by provider] heparin  5,000 Units Subcutaneous 2 times per day       Objective:    Review of Systems:   10 point ROS completed and was negative, except for pertinent positive and negatives stated in subjective.    Vital signs:  Temp:  [98.2 °F (36.8 °C)-98.8 °F (37.1 °C)] 98.8 °F (37.1 °C)  Pulse:  [] 102  Resp:  [10-19] 11  BP: (123-164)/() 123/94  SpO2:  [93 %-98 %] 93 %  Patient Weight for the past 72 hrs:   Weight   24 1304 200 lb 11.2 oz (91 kg)     Physical Exam:    General: No acute distress.   Neuro: AAOx3    Diagnostic Data:    Labs:  Recent Labs   Lab 24  0538 24  1034 24  0747   WBC 9.6 6.5 6.6   HGB 14.2 12.1 13.3   MCV 81.0 81.6 79.2*   .0 231.0 279.0       Recent Labs   Lab 24  0538 24  1034 24  2121 24  0747   * 95  --  104*   BUN 13 13  --  8*   CREATSERUM 0.98 0.67  --  0.81   CA 10.2* 8.4*  --  8.7   ALB 4.1  --   --   --     140  --  137   K 3.3* 2.9*  2.9* 3.8 3.4*    109  --  110   CO2 24.0 24.0  --  23.0   ALKPHO 106*  --   --   --    AST 17  --   --   --    ALT 19  --   --   --    BILT 0.3  --   --   --    TP 9.0*  --   --   --        Estimated Creatinine Clearance: 73 mL/min (based on SCr of 0.81 mg/dL).    No results for input(s): \"PTP\", \"INR\" in  the last 168 hours.         COVID-19 Lab Results    COVID-19  Lab Results   Component Value Date    COVID19 Not Detected 02/19/2024    COVID19 NOT DETECTED 12/09/2021       Pro-Calcitonin  No results for input(s): \"PCT\" in the last 168 hours.    Cardiac  No results for input(s): \"TROP\", \"PBNP\" in the last 168 hours.    Creatinine Kinase  No results for input(s): \"CK\" in the last 168 hours.    Inflammatory Markers  Recent Labs   Lab 02/19/24  0538   DDIMER 0.45       Recent Labs   Lab 02/19/24 0538 02/19/24  1500   TROPHS 6 9       Imaging: Imaging data reviewed in Epic.    Medications:    potassium chloride  40 mEq Intravenous Once    isosorbide dinitrate  5 mg Oral TID (Nitrates)    [Held by provider] clonazePAM  0.5 mg Oral BID    sucralfate  1 g Oral TID AC and HS    pantoprazole  40 mg Intravenous Q12H    [Held by provider] sertraline  50 mg Oral Daily    amLODIPine  10 mg Oral Daily    [Held by provider] heparin  5,000 Units Subcutaneous 2 times per day       Assessment & Plan:    Chest pain, non-cardiac, trop neg  Essential hypertension  Norvasc  Hydralazine PRN  Monitor hemodynamics  Dysphagia sp EGD 2/19 with circumferential ulceration at GE junction, some retained fluid without retained solid material. Tight esophageal sphincter, achalasia? Manometry with achalasia type 2 on 2/21/2024  PPI  Carafate  Follow-up pathology   Endoscopic intervention today  GI consult   Anxiety  Klonopin & Zoloft - unable to tolerate pills  Valium not effective, trial with Ativan IV PRN - no significant effect  Hypercalcemia, resolved    Supplementary Documentation:   Quality:  DVT Prophylaxis: Heparin - hold     At this point Ms. Tovar is expected to be discharge to: Home    Plan of care discussed with patient and RN.    Alexx Victoria MD

## 2024-02-22 NOTE — PROGRESS NOTES
02/22/24 0952   Over the last 2 weeks, how often have you been bothered by any of the following problems?   Little interest or pleasure in doing things 3  (PT reports feeling sad and not feeling well medically)   Feeling down, depressed, or hopeless 3   Trouble falling or staying asleep, or sleeping too much 3  (PT reports difficulty staying asleep.)   Feeling tired or having little energy 3   Poor appetite or overeating 3   Feeling bad about yourself - or that you are a failure or have let yourself or your family down 1   Trouble concentrating on things, such as reading the newspaper or watching television 1   Moving or speaking so slowly that other people could have noticed. Or the opposite - being so fidgety or restless that you have been moving around a lot more than usual 0   Thoughts that you would be better off dead, or of hurting yourself in some way 0   PHQ-9 TOTAL SCORE 17   If you checked off any problems, how difficult have these problems made it for you to do your work, take care of things at home, or get along with other people? Somewhat difficult   OTHER   Comments PT is not feeling well today. PT stated that she has been diagnosed with [place]. PT is lving alone and completing ADLs. PT reports sadness, helplessness, hopelessness, and worthlessness. PT stated that it has been ongoing for a year. PT is working full time and has no issues. PT is currently living in a hotel and has had a hard time finding her own apartment. PT denied SI, HI, AVH, SIB, aggression, violence, property destruction, paranoia, mela, delusions, or psychosis. PT was offered outpatient referrals and patient was agreeable.

## 2024-02-22 NOTE — PLAN OF CARE
Assumed care at 0700  Pt AxOx4, SR/ST, RA  C/o abd pain relieved with prn morpine  Benadryl prn given for itching and ativan given for anxiety with relief  Unable to swallow pills  Kept NPO for GI procedure. Pt down for procedure this evening.  IVF running per order  Pt updated on POC      Problem: GASTROINTESTINAL - ADULT  Goal: Minimal or absence of nausea and vomiting  Description: INTERVENTIONS:  - Maintain adequate hydration with IV or PO as ordered and tolerated  - Nasogastric tube to low intermittent suction as ordered  - Evaluate effectiveness of ordered antiemetic medications  - Provide nonpharmacologic comfort measures as appropriate  - Advance diet as tolerated, if ordered  - Obtain nutritional consult as needed  - Evaluate fluid balance  2/22/2024 1440 by Dary Boateng, RN  Outcome: Progressing  Goal: Maintains or returns to baseline bowel function  Description: INTERVENTIONS:  - Assess bowel function  - Maintain adequate hydration with IV or PO as ordered and tolerated  - Evaluate effectiveness of GI medications  - Encourage mobilization and activity  - Obtain nutritional consult as needed  - Establish a toileting routine/schedule  - Consider collaborating with pharmacy to review patient's medication profile  2/22/2024 1440 by Dary Boateng, RN  Outcome: Progressing

## 2024-02-22 NOTE — ANESTHESIA PREPROCEDURE EVALUATION
PRE-OP EVALUATION    Patient Name: Mago Tovar    Admit Diagnosis: Panic attack [F41.0]  Abdominal pain of unknown etiology [R10.9]  Esophageal obstruction due to food impaction [T18.128A, W44.F3XA]    Pre-op Diagnosis: INPATIENT    ESOPHAGOGASTRODUODENOSCOPY (EGD) with possible dilation    Anesthesia Procedure: ESOPHAGOGASTRODUODENOSCOPY (EGD) with possible dilation    Surgeon(s) and Role:     * Perico Roa MD - Primary    Pre-op vitals reviewed.  Temp: 98.3 °F (36.8 °C)  Pulse: 88  Resp: 11  BP: 155/98  SpO2: 99 %  Body mass index is 36.71 kg/m².    Current medications reviewed.  Hospital Medications:   [COMPLETED] potassium chloride 40 mEq in 250mL sodium chloride 0.9% IVPB premix  40 mEq Intravenous Once    isosorbide dinitrate (Isordil Titradose) tab 5 mg  5 mg Oral TID (Nitrates)    diphenhydrAMINE (Benadryl) 50 mg/mL  injection 12.5 mg  12.5 mg Intravenous Q4H PRN    Or    diphenhydrAMINE (Benadryl) cap/tab 25 mg  25 mg Oral Q4H PRN    onabotulinumtoxinA (Botox) injection 100 Units  100 Units Injection Once    lidocaine 2% topical sterile jelly    PRN    LORazepam (Ativan) 2 mg/mL injection 1 mg  1 mg Intravenous Q6H PRN    hydrALAzine (Apresoline) 20 mg/mL injection 10 mg  10 mg Intravenous Q6H PRN    [COMPLETED] LORazepam (Ativan) 2 mg/mL injection 0.5 mg  0.5 mg Intravenous Once    morphINE PF 2 MG/ML injection 2 mg  2 mg Intravenous Q2H PRN    [COMPLETED] diazepam (Valium) 5 mg/mL injection 2.5 mg  2.5 mg Intravenous Once    [Held by provider] clonazePAM (KlonoPIN) tab 0.5 mg  0.5 mg Oral BID    [COMPLETED] potassium chloride 40 mEq in 250mL sodium chloride 0.9% IVPB premix  40 mEq Intravenous Once    Followed by    [COMPLETED] potassium chloride 20 mEq/100mL IVPB premix 20 mEq  20 mEq Intravenous Once    sucralfate (Carafate) 1 GM/10ML oral suspension 1 g  1 g Oral TID AC and HS    pantoprazole (Protonix) 40 mg in sodium chloride 0.9% PF 10 mL IV push  40 mg Intravenous Q12H     [COMPLETED] sodium chloride 0.9 % IV bolus 1,000 mL  1,000 mL Intravenous Once    [COMPLETED] ALPRAZolam (Xanax) tab 1 mg  1 mg Oral Once    [COMPLETED] ondansetron (Zofran) 4 MG/2ML injection 4 mg  4 mg Intravenous Once    [COMPLETED] diazepam (Valium) 5 mg/mL injection 5 mg  5 mg Intravenous Once    [COMPLETED] potassium chloride 20 mEq/100mL IVPB premix 20 mEq  20 mEq Intravenous Once    [Held by provider] sertraline (Zoloft) tab 50 mg  50 mg Oral Daily    amLODIPine (Norvasc) tab 10 mg  10 mg Oral Daily    acetaminophen (Tylenol Extra Strength) tab 500 mg  500 mg Oral Q4H PRN    melatonin tab 3 mg  3 mg Oral Nightly PRN    glycerin-hypromellose- (Artifical Tears) 0.2-0.2-1 % ophthalmic solution 1 drop  1 drop Both Eyes QID PRN    sodium chloride (Saline Mist) 0.65 % nasal solution 1 spray  1 spray Each Nare Q3H PRN    [Held by provider] heparin (Porcine) 5000 UNIT/ML injection 5,000 Units  5,000 Units Subcutaneous 2 times per day    polyethylene glycol (PEG 3350) (Miralax) 17 g oral packet 17 g  17 g Oral Daily PRN    sennosides (Senokot) tab 17.2 mg  17.2 mg Oral Nightly PRN    bisacodyl (Dulcolax) 10 MG rectal suppository 10 mg  10 mg Rectal Daily PRN    fleet enema (Fleet) 7-19 GM/118ML rectal enema 133 mL  1 enema Rectal Once PRN    ondansetron (Zofran) 4 MG/2ML injection 4 mg  4 mg Intravenous Q6H PRN    sodium chloride 0.9% infusion   Intravenous Continuous    haloperidol lactate (Haldol) 5 MG/ML injection 0.5 mg  0.5 mg Intravenous Q6H PRN    [COMPLETED] iopamidol 76% (ISOVUE-370) injection for power injector  100 mL Intravenous ONCE PRN       Outpatient Medications:     Medications Prior to Admission   Medication Sig Dispense Refill Last Dose    QUEtiapine 25 MG Oral Tab Take 1 tablet (25 mg total) by mouth nightly.       losartan 50 MG Oral Tab Take 2 tablets (100 mg total) by mouth daily. 30 tablet 1 2/18/2024    amLODIPine 10 MG Oral Tab Take 1 tablet (10 mg total) by mouth daily. 30 tablet 1  2/18/2024    sertraline 50 MG Oral Tab Take 1 tablet (50 mg total) by mouth daily. (Patient not taking: Reported on 2/19/2024) 30 tablet 1 Not Taking    traZODone 50 MG Oral Tab Take 2 tablets (100 mg total) by mouth nightly. (Patient not taking: Reported on 2/19/2024) 30 tablet 1 Not Taking       Allergies: Ketorolac tromethamine, Metoclopramide, Naproxen, and Prochlorperazine      Anesthesia Evaluation    Patient summary reviewed.    Anesthetic Complications           GI/Hepatic/Renal  Comment: Esophageal bolus                                Cardiovascular        Exercise tolerance: good     MET: >4      (+) hypertension                                     Endo/Other    Negative endo/other ROS.                              Pulmonary    Negative pulmonary ROS.                       Neuro/Psych        (+) anxiety                              Past Surgical History:   Procedure Laterality Date    APPENDECTOMY      REMOVAL GALLBLADDER      TOTAL ABDOM HYSTERECTOMY       Social History     Socioeconomic History    Marital status: Single   Tobacco Use    Smoking status: Never   Vaping Use    Vaping Use: Never used   Substance and Sexual Activity    Alcohol use: Yes     Comment: social    Drug use: Never     History   Drug Use Unknown     Available pre-op labs reviewed.  Lab Results   Component Value Date    WBC 6.6 02/22/2024    RBC 5.14 02/22/2024    HGB 13.3 02/22/2024    HCT 40.7 02/22/2024    MCV 79.2 (L) 02/22/2024    MCH 25.9 (L) 02/22/2024    MCHC 32.7 02/22/2024    RDW 14.2 02/22/2024    .0 02/22/2024     Lab Results   Component Value Date     02/22/2024    K 3.4 (L) 02/22/2024     02/22/2024    CO2 23.0 02/22/2024    BUN 8 (L) 02/22/2024    CREATSERUM 0.81 02/22/2024     (H) 02/22/2024    CA 8.7 02/22/2024            Airway      Mallampati: II  Mouth opening: 3 FB  TM distance: 4 - 6 cm  Neck ROM: full Cardiovascular    Cardiovascular exam normal.         Dental    Dentition appears  grossly intact         Pulmonary    Pulmonary exam normal.                 Other findings              ASA: 2   Plan: MAC  NPO status verified and patient meets guidelines.    Post-procedure pain management plan discussed with surgeon and patient.      Plan/risks discussed with: patient                Present on Admission:  **None**

## 2024-02-22 NOTE — CONSULTS
[facility]                       [facility]    [facility] Aurea Tovar Patient Status:  Inpatient    3/31/1985 MRN [id number]   Location [facility] 3NE-A Attending Alexx Victoria MD   Hosp Day # 3 PCP None Pcp     Reason for consultation: Dysphagia, concern for achalasia  HPI: This is a 38-year-old female with a history that includes HTN, anxiety, and chronic dysphagia who was admitted 2024 with progressive dysphagia.  She is completed an inpatient evaluation with Dr. Hart ([facility]) with concern for possible achalasia.  Dr. Hart is currently requesting interventional GI eval by [facility].  Patient notes longstanding history of dysphagia with solids and liquids over the last several years with symptoms progressively worsening over the last 2 months.  She reports frequent regurgitation after bolus feels stuck at her mid sternum.  She also notes chronic nausea with nonbloody vomiting and reports constant severe heartburn despite PPI 4 times daily.  She also reports occasional odynophagia, dark stools, and intermittent diarrhea.  She reports NSAIDs and OTC acid suppressants to help manage her chronic symptoms.  No chronic anticoagulants or antiplatelet agents.  PMHx:   Past Medical History:   Diagnosis Date    Anxiety     Essential hypertension                 PSHx:   Past Surgical History:   Procedure Laterality Date    APPENDECTOMY      REMOVAL GALLBLADDER      TOTAL ABDOM HYSTERECTOMY       Medications:    potassium chloride 40 mEq in 250mL sodium chloride 0.9% IVPB premix  40 mEq Intravenous Once    isosorbide dinitrate (Isordil Titradose) tab 5 mg  5 mg Oral TID (Nitrates)    lidocaine 2% topical sterile jelly    PRN    LORazepam (Ativan) 2 mg/mL injection 1 mg  1 mg Intravenous Q6H PRN    hydrALAzine (Apresoline) 20 mg/mL injection 10 mg  10 mg Intravenous Q6H PRN    [COMPLETED] LORazepam (Ativan) 2 mg/mL injection 0.5 mg  0.5 mg Intravenous  Once    morphINE PF 2 MG/ML injection 2 mg  2 mg Intravenous Q2H PRN    [COMPLETED] diazepam (Valium) 5 mg/mL injection 2.5 mg  2.5 mg Intravenous Once    [Held by provider] clonazePAM (KlonoPIN) tab 0.5 mg  0.5 mg Oral BID    [COMPLETED] potassium chloride 40 mEq in 250mL sodium chloride 0.9% IVPB premix  40 mEq Intravenous Once    Followed by    [COMPLETED] potassium chloride 20 mEq/100mL IVPB premix 20 mEq  20 mEq Intravenous Once    sucralfate (Carafate) 1 GM/10ML oral suspension 1 g  1 g Oral TID AC and HS    pantoprazole (Protonix) 40 mg in sodium chloride 0.9% PF 10 mL IV push  40 mg Intravenous Q12H    [COMPLETED] sodium chloride 0.9 % IV bolus 1,000 mL  1,000 mL Intravenous Once    [COMPLETED] ALPRAZolam (Xanax) tab 1 mg  1 mg Oral Once    [COMPLETED] ondansetron (Zofran) 4 MG/2ML injection 4 mg  4 mg Intravenous Once    [COMPLETED] diazepam (Valium) 5 mg/mL injection 5 mg  5 mg Intravenous Once    [COMPLETED] potassium chloride 20 mEq/100mL IVPB premix 20 mEq  20 mEq Intravenous Once    [Held by provider] sertraline (Zoloft) tab 50 mg  50 mg Oral Daily    amLODIPine (Norvasc) tab 10 mg  10 mg Oral Daily    acetaminophen (Tylenol Extra Strength) tab 500 mg  500 mg Oral Q4H PRN    melatonin tab 3 mg  3 mg Oral Nightly PRN    glycerin-hypromellose- (Artifical Tears) 0.2-0.2-1 % ophthalmic solution 1 drop  1 drop Both Eyes QID PRN    sodium chloride (Saline Mist) 0.65 % nasal solution 1 spray  1 spray Each Nare Q3H PRN    [Held by provider] heparin (Porcine) 5000 UNIT/ML injection 5,000 Units  5,000 Units Subcutaneous 2 times per day    polyethylene glycol (PEG 3350) (Miralax) 17 g oral packet 17 g  17 g Oral Daily PRN    sennosides (Senokot) tab 17.2 mg  17.2 mg Oral Nightly PRN    bisacodyl (Dulcolax) 10 MG rectal suppository 10 mg  10 mg Rectal Daily PRN    fleet enema (Fleet) 7-19 GM/118ML rectal enema 133 mL  1 enema Rectal Once PRN    ondansetron (Zofran) 4 MG/2ML injection 4 mg  4 mg Intravenous  Q6H PRN    sodium chloride 0.9% infusion   Intravenous Continuous    haloperidol lactate (Haldol) 5 MG/ML injection 0.5 mg  0.5 mg Intravenous Q6H PRN    [COMPLETED] iopamidol 76% (ISOVUE-370) injection for power injector  100 mL Intravenous ONCE PRN     Allergies:   Allergies   Allergen Reactions    Ketorolac Tromethamine HIVES    Metoclopramide SHORTNESS OF BREATH    Naproxen HIVES    Prochlorperazine SHORTNESS OF BREATH     Social HX:   Social History     Socioeconomic History    Marital status: Single   Tobacco Use    Smoking status: Never   Vaping Use    Vaping Use: Never used   Substance and Sexual Activity    Alcohol use: Yes     Comment: social    Drug use: Never     Social Determinants of Health     Food Insecurity: Food Insecurity Present (2/19/2024)    Food Insecurity     Food Insecurity: Often true   Transportation Needs: No Transportation Needs (2/19/2024)    Transportation Needs     Lack of Transportation: No   Housing Stability: Medium Risk (2/19/2024)    Housing Stability     Housing Instability: Yes      FamHx: The patient has no family history of colon cancer or other gastrointestinal malignancies;  No family history of ulcer disease, or inflammatory bowel disease  ROS:  In addition to the pertinent positives described above:            Infectious Disease:  No chronic infections or recent fevers prior to the acute illness            Cardiovascular: + HTN            Respiratory: No shortness of breath, asthma, copd, recurrent pneumonia            Hematologic: The patient reports no easy bruising, frequent gum bleeding or nose bleeding;  The patient has no history of known chronic anemia            Dermatologic: The patient reports no recent rashes or chronic skin disorders            Rheumatologic: The patient reports no history of chronic arthritis, myalgias, arthralgias            Genitourinary:  The patient reports no history of recurrent urinary tract infections, hematuria, dysuria, or  nephrolithiasis           Psychiatric: + anxiety           Oncologic: The patient reports no history of prior solid tumor or hematologic malignancy           ENT: The patient reports no hoarseness of voice, hearing loss, sinus congestion, tinnitus           Neurologic: The patient reports no history of seizure, stroke, or frequent headaches  PE: BP (!) 123/94 (BP Location: Right arm)   Pulse 102   Temp 98.8 °F (37.1 °C) (Oral)   Resp 11   Wt 200 lb 11.2 oz (91 kg)   SpO2 93%   BMI 36.71 kg/m²   Gen: AAO x 3, able to speak in complete sentences  HENT: EOMI, PERRL, oropharynx is clear with moist mucosal membranes  Eyes: Sclerae are anicteric  Neck:  Supple without nuchal rigidity  CV: Regular rate and rhythm, with normal S1 and S2  Resp: Clear to auscultation bilaterally without wheezes; rubs, rhonchi, or rales  Abdomen: Soft, non-tender, non-distended with the presence of bowel sounds; No hepatosplenomegaly; no rebound or guarding; No ascites is clinically apparent; no tympany to percussion  Ext: No peripheral edema or cyanosis  Skin: Warm and dry  Psychiatric: Appropriate mood and congruent affect without obvious depression or anxiety  Labs:   Lab Results   Component Value Date    WBC 6.6 02/22/2024    HGB 13.3 02/22/2024    HCT 40.7 02/22/2024    .0 02/22/2024    CREATSERUM 0.81 02/22/2024    BUN 8 02/22/2024     02/22/2024    K 3.4 02/22/2024     02/22/2024    CO2 23.0 02/22/2024     02/22/2024    CA 8.7 02/22/2024     Recent Labs   Lab 02/19/24  0538 02/20/24  1034 02/20/24  2121 02/22/24  0747   * 95  --  104*   BUN 13 13  --  8*   CREATSERUM 0.98 0.67  --  0.81   CA 10.2* 8.4*  --  8.7    140  --  137   K 3.3* 2.9*  2.9* 3.8 3.4*    109  --  110   CO2 24.0 24.0  --  23.0     Recent Labs   Lab 02/22/24  0747   RBC 5.14   HGB 13.3   HCT 40.7   MCV 79.2*   MCH 25.9*   MCHC 32.7   RDW 14.2   NEPRELIM 3.38   WBC 6.6   .0       Recent Labs   Lab  02/19/24  0538   ALT 19   AST 17       Impression: 38-year-old female with chronic dysphagia admitted with acute on chronic symptoms with workup concerning for achalasia.  Interventional GI consulted for possible EGD with dilation versus possible Botox injections  The risks, benefits, alternatives of the procedure including the risks of anesthesia, bleeding, perforation, missed lesions, need for surgery, and infection were discussed with the patient. She expressed understanding of the risks and was agreeable to proceed.    Recommendations:     Plan for EGD with possible dilation, possible biopsy, possible Botox under MAC with Dr. Roa  Management of care by established GI--Dr. Hart  Pain management per hospitalist recommendations; antiemetics as needed    Thank you for the consultation, we will follow the patient with you.  Attending addendum (Dr Roa) to follow later today and provide formal, final recommendations at that time   ZENY Dozier  10:05 AM  2/22/2024  [facility]  [phone]

## 2024-02-22 NOTE — PROGRESS NOTES
Assumed care at 2215  A&Ox4  RA  NSR on tele, ST occasionally when anxious  NPO @ midnight for EGD in AM  Up ad cheyanne  PIV L FA infusing 0.9 @ 83ml/hr  Call light within reach, safety precautions maintained, All needs met at this time

## 2024-02-23 VITALS
TEMPERATURE: 98 F | BODY MASS INDEX: 37 KG/M2 | SYSTOLIC BLOOD PRESSURE: 146 MMHG | WEIGHT: 200.69 LBS | RESPIRATION RATE: 18 BRPM | DIASTOLIC BLOOD PRESSURE: 104 MMHG | OXYGEN SATURATION: 91 % | HEART RATE: 91 BPM

## 2024-02-23 LAB — POTASSIUM SERPL-SCNC: 3.4 MMOL/L (ref 3.5–5.1)

## 2024-02-23 PROCEDURE — 99239 HOSP IP/OBS DSCHRG MGMT >30: CPT | Performed by: HOSPITALIST

## 2024-02-23 RX ORDER — POTASSIUM CHLORIDE 20 MEQ/1
40 TABLET, EXTENDED RELEASE ORAL ONCE
Status: COMPLETED | OUTPATIENT
Start: 2024-02-23 | End: 2024-02-23

## 2024-02-23 RX ORDER — LOSARTAN POTASSIUM 100 MG/1
100 TABLET ORAL DAILY
Qty: 90 TABLET | Refills: 0 | Status: SHIPPED | OUTPATIENT
Start: 2024-02-23

## 2024-02-23 RX ORDER — PANTOPRAZOLE SODIUM 40 MG/1
40 TABLET, DELAYED RELEASE ORAL
Qty: 30 TABLET | Refills: 3 | Status: SHIPPED | OUTPATIENT
Start: 2024-02-23 | End: 2024-03-02

## 2024-02-23 RX ORDER — CLONAZEPAM 0.5 MG/1
0.5 TABLET ORAL 2 TIMES DAILY PRN
Qty: 30 TABLET | Refills: 0 | Status: SHIPPED | OUTPATIENT
Start: 2024-02-23

## 2024-02-23 RX ORDER — QUETIAPINE FUMARATE 25 MG/1
25 TABLET, FILM COATED ORAL NIGHTLY
Qty: 90 TABLET | Refills: 0 | Status: SHIPPED | OUTPATIENT
Start: 2024-02-23

## 2024-02-23 RX ORDER — AMLODIPINE BESYLATE 10 MG/1
10 TABLET ORAL DAILY
Qty: 90 TABLET | Refills: 0 | Status: SHIPPED | OUTPATIENT
Start: 2024-02-23 | End: 2024-05-23

## 2024-02-23 RX ORDER — PANTOPRAZOLE SODIUM 40 MG/1
40 TABLET, DELAYED RELEASE ORAL
Status: DISCONTINUED | OUTPATIENT
Start: 2024-02-23 | End: 2024-02-23

## 2024-02-23 NOTE — ANESTHESIA POSTPROCEDURE EVALUATION
[facility] Patient Status:  Inpatient   Age/Gender 38 year old female MRN [id number]   Location [facility] ENDOSCOPY PAIN CENTER Attending Alexx Victoria MD   Hosp Day # 3 PCP None Pcp       Anesthesia Post-op Note    ESOPHAGOGASTRODUODENOSCOPY (EGD) with balloon dilation to 20mm and botox submucosal injection    Procedure Summary       Date: 02/22/24 Room / Location:  ENDOSCOPY 02 / [facility] ENDOSCOPY    Anesthesia Start: 1805 Anesthesia Stop: 1824    Procedure: ESOPHAGOGASTRODUODENOSCOPY (EGD) with balloon dilation to 20mm and botox submucosal injection Diagnosis: (dysphagia)    Surgeons: Perico Roa MD Anesthesiologist: Odilon Varela MD    Anesthesia Type: MAC ASA Status: 2            Anesthesia Type: MAC    Vitals Value Taken Time   /72 02/22/24 1825   Temp 98 02/22/24 1825   Pulse 75 02/22/24 1825   Resp 16 02/22/24 1825   SpO2 98 02/22/24 1825       Patient Location: Endoscopy    Anesthesia Type: MAC    Airway Patency: patent    Postop Pain Control: adequate    Mental Status: mildly sedated but able to meaningfully participate in the post-anesthesia evaluation    Nausea/Vomiting: none    Cardiopulmonary/Hydration status: stable euvolemic    Complications: no apparent anesthesia related complications    Postop vital signs: stable    Dental Exam: Unchanged from Preop    Patient to be discharged home when criteria met.

## 2024-02-23 NOTE — CM/SW NOTE
SW received VM from pt requesting to speak with SW. SW met with pt at bedside. Pt stated she will be discharging today and inquired on transportation. SW encouraged pt to reach out to her [facility] Medicaid to arrange transportation, and discussed Lyft/Uber/Cab if Medicaid is unable to transport. Pt inquired on housing resources. Homeless shelter information had been provided to pt. SW encouraged pt to reach out to [facility] or the [facility]. Pt stated she is staying at the [facility] in [place] and will return to hospitals at discharge. Pt denied any further needs at this time.     JAHAIRA Reyna  Discharge Planner

## 2024-02-23 NOTE — PROGRESS NOTES
[address] [address]  ?  02/23/2024  ?  Re: Mago Tovar  ?  To Whom It May Concern:    Mago Tovar was admitted to [facility] from 2/19/2024 to 02/23/2024.    Please excuse Mago Tovar from attending work for these days.    The patient may return to work on Tuesday, February 27, 2024.    Patient will follow up with their primary care physician upon discharge.     Further restrictions and work excuse will be based on primary care physician's evaluation.  ?  Thank you,    Alexx Victoria MD, MD  [facility]

## 2024-02-23 NOTE — PROGRESS NOTES
[facility]  Progress Note    Mago Tovar Patient Status:  Inpatient    3/31/1985 MRN [id number]   [facility] 3NE-A Attending Alexx Victoria MD   Hosp Day # 4 PCP None Pcp     Subjective:  Dysphagia resolved - able to eat breakfast.  NO complaints today.      Current Facility-Administered Medications:     pantoprazole (Protonix) DR tab 40 mg, 40 mg, Oral, QAM AC    diphenhydrAMINE (Benadryl) 50 mg/mL  injection 12.5 mg, 12.5 mg, Intravenous, Q4H PRN **OR** diphenhydrAMINE (Benadryl) cap/tab 25 mg, 25 mg, Oral, Q4H PRN    lactated ringers infusion, , Intravenous, Continuous    hydrALAzine (Apresoline) 20 mg/mL injection 10 mg, 10 mg, Intravenous, Q6H PRN    morphINE PF 2 MG/ML injection 2 mg, 2 mg, Intravenous, Q2H PRN    clonazePAM (KlonoPIN) tab 0.5 mg, 0.5 mg, Oral, BID    sertraline (Zoloft) tab 50 mg, 50 mg, Oral, Daily    amLODIPine (Norvasc) tab 10 mg, 10 mg, Oral, Daily    acetaminophen (Tylenol Extra Strength) tab 500 mg, 500 mg, Oral, Q4H PRN    melatonin tab 3 mg, 3 mg, Oral, Nightly PRN    glycerin-hypromellose- (Artifical Tears) 0.2-0.2-1 % ophthalmic solution 1 drop, 1 drop, Both Eyes, QID PRN    sodium chloride (Saline Mist) 0.65 % nasal solution 1 spray, 1 spray, Each Nare, Q3H PRN    [Held by provider] heparin (Porcine) 5000 UNIT/ML injection 5,000 Units, 5,000 Units, Subcutaneous, 2 times per day    polyethylene glycol (PEG 3350) (Miralax) 17 g oral packet 17 g, 17 g, Oral, Daily PRN    sennosides (Senokot) tab 17.2 mg, 17.2 mg, Oral, Nightly PRN    bisacodyl (Dulcolax) 10 MG rectal suppository 10 mg, 10 mg, Rectal, Daily PRN    fleet enema (Fleet) 7-19 GM/118ML rectal enema 133 mL, 1 enema, Rectal, Once PRN    ondansetron (Zofran) 4 MG/2ML injection 4 mg, 4 mg, Intravenous, Q6H PRN    sodium chloride 0.9% infusion, , Intravenous, Continuous    haloperidol lactate (Haldol) 5 MG/ML injection 0.5 mg, 0.5 mg, Intravenous, Q6H PRN    Past Medical History:    Diagnosis Date    Anxiety     Essential hypertension      Past Surgical History:   Procedure Laterality Date    APPENDECTOMY      REMOVAL GALLBLADDER      TOTAL ABDOM HYSTERECTOMY           Objective:  Blood pressure (!) 146/104, pulse 91, temperature 98 °F (36.7 °C), temperature source Oral, resp. rate 18, weight 200 lb 11.2 oz (91 kg), SpO2 91%, not currently breastfeeding.      EXAM:  BP (!) 146/104 (BP Location: Left arm)   Pulse 91   Temp 98 °F (36.7 °C) (Oral)   Resp 18   Wt 200 lb 11.2 oz (91 kg)   SpO2 91%   BMI 36.71 kg/m²   GENERAL: well developed, well nourished, in no apparent distress  HEENT: atraumatic, normocephalic  CHEST: no chest tenderness  LUNGS: clear to auscultation  CARDIO: RRR without murmur  GI: good BS's and no masses, HSM or tenderness  EXTREMITIES: no cyanosis, clubbing or edema      Labs:   Lab Results   Component Value Date    K 3.4 2024     Case Time: Procedures: Surgeons:   2024  6:08 PM ESOPHAGOGASTRODUODENOSCOPY (EGD) with balloon dilation to 20mm and botox submucosal injection    Perico Roa MD               Signed                  [facility] Patient Status:  Inpatient    3/31/1985 MRN [id number]   Location [facility] ENDOSCOPY PAIN CENTER Attending Alexx Victoria MD   Date 2024 PCP None Pcp      PREOPERATIVE DIAGNOSIS/INDICATION: Esophageal motility disorder, dysphagia, abnormal manometry reviewed by me suggestive of EGJOO, not meeting criteria for Achalasia, Nutcracker esophagus or LISANDRA  POSTOPERTATIVE DIAGNOSIS: Changes suggestive of LISANDRA or developing type III achalasia/nutcracker esophagus  PROCEDURE PERFORMED: EGD with balloon dilatation 20 mm and Botox injection  TIME OUT WAS PERFORMED     SEDATION: MAC sedation provided by General Anesthesia     INFORMED CONSENT: Risks, benefits and alternatives to the procedure were explained to the patient including but not limited to bleeding, infection, perforation, adverse drug  reactions, pancreatitis and the need for hospitalization and surgery if this occurs, the patient understands and agrees to procedure.  PROCEDURE DESCRIPTION: A regular upper endoscope was introduced into the patient’s mouth, hypo pharynx, esophagus, stomach and the first and second portion of the duodenum, retroflexion of the endoscope was performed in the stomach. Careful examination of the above described areas was performed on withdrawal of the endoscope. The patient tolerated the procedure well, there were no immediate complication immediately following the procedure, and the patient was transferred to recovery in stable condition.  FINDINGS:  ESOPHAGUS: Mid/distal esophageal vigorous spasm, small amount of fluid residue  EGJ: Small hiatal hernia  STOMACH: Normal  DUODENUM: Normal  THERAPEUTICS: Esophageal ring balloon dilatation through the scope with CRE fixed wire balloon size 18-19-20 mm mucosal inspection was performed after each dilatation diameter, no mucosal break noted, no complications, perforation of bleeding noted, Botox 100 units total was injected in a four quadrant fashion at the area of spasm, luminal tightness  RECOMMENDATIONS:   Post EGD precautions, watch for bleeding, infection, perforation, adverse drug reactions   Trial of clears and advance to soft as tolerated  OV follow up as outpatient  Consider referral to tertiary center with motility capabilities  Repeat high resolution manometry as outpatient, if meeting criteria for LISANDRA or Type III achalasia consider TA Roa MD  2/22/2024  6:32 PM              Assessment:     Dysphagia  Esophageal dysmotility - likely achalasia   Marked improvement s/p botox injection into the LES yesterday with Dr. Roa.  Pt able to swallow, no further pain.     Presentation with esophageal motility disorder favoring LISANDRA vs achalasia.    Plan:  1. Ok to discharge to home today.  2. Continue pantoprazole 40 mg per day on discharge.  3. Follow  up evaluation at [facility][1] in [place] - Dr. Marshall Calderon or Bakari Romero, who are esophageal / achalasia specialists, as the pt will require further evaluation for more definitive surgical therapy vs POEM vs pneumatic balloon dilation.       Efraín Hart MD  2/23/2024  10:55 AM

## 2024-02-23 NOTE — PROGRESS NOTES
NURSING DISCHARGE NOTE    Discharged to [facility] (where she is currently staying, see SW note for more details.) via Wheelchair.  Accompanied by RN  Belongings Taken by patient/family.  Peripheral IV discontinued. AVS reviewed and sent home with the patient along with return to work letter from MD. All questions answered at this time.

## 2024-02-23 NOTE — PROGRESS NOTES
[facility]     Hospitalist Progress Note     Carolecuauhtemoc Tovar Patient Status:  Inpatient    3/31/1985 MRN [id number]   Location [facility] 3NE-A Attending Marck Boone MD   Hosp Day # 4 PCP None Pcp     Chief Complaint: Chest pain     Subjective:   Patient feeling much better! Tolerating soft diet. Asking to go home.     Current medications:   pantoprazole  40 mg Oral QAM AC    potassium chloride  40 mEq Oral Once    clonazePAM  0.5 mg Oral BID    sertraline  50 mg Oral Daily    amLODIPine  10 mg Oral Daily    [Held by provider] heparin  5,000 Units Subcutaneous 2 times per day       Objective:    Review of Systems:   10 point ROS completed and was negative, except for pertinent positive and negatives stated in subjective.    Vital signs:  Temp:  [97.9 °F (36.6 °C)-98.5 °F (36.9 °C)] 98 °F (36.7 °C)  Pulse:  [83-92] 91  Resp:  [11-22] 18  BP: (137-160)/() 146/104  SpO2:  [91 %-100 %] 91 %  Patient Weight for the past 72 hrs:   Weight   24 1304 200 lb 11.2 oz (91 kg)     Physical Exam:    General: No acute distress.   CVS S1 S2  Abd Soft  Neuro: AAOx3    Diagnostic Data:    Labs:  Recent Labs   Lab 24  0538 24  1034 24  0747   WBC 9.6 6.5 6.6   HGB 14.2 12.1 13.3   MCV 81.0 81.6 79.2*   .0 231.0 279.0       Recent Labs   Lab 24  0538 24  1034 24  2121 24  0747 24  0837   * 95  --  104*  --    BUN 13 13  --  8*  --    CREATSERUM 0.98 0.67  --  0.81  --    CA 10.2* 8.4*  --  8.7  --    ALB 4.1  --   --   --   --     140  --  137  --    K 3.3* 2.9*  2.9* 3.8 3.4* 3.4*    109  --  110  --    CO2 24.0 24.0  --  23.0  --    ALKPHO 106*  --   --   --   --    AST 17  --   --   --   --    ALT 19  --   --   --   --    BILT 0.3  --   --   --   --    TP 9.0*  --   --   --   --        Estimated Creatinine Clearance: 73 mL/min (based on SCr of 0.81 mg/dL).    No results for input(s): \"PTP\", \"INR\" in the last 168 hours.          COVID-19 Lab Results    COVID-19  Lab Results   Component Value Date    COVID19 Not Detected 02/19/2024    COVID19 NOT DETECTED 12/09/2021       Pro-Calcitonin  No results for input(s): \"PCT\" in the last 168 hours.    Cardiac  No results for input(s): \"TROP\", \"PBNP\" in the last 168 hours.    Creatinine Kinase  No results for input(s): \"CK\" in the last 168 hours.    Inflammatory Markers  Recent Labs   Lab 02/19/24 0538   DDIMER 0.45       Recent Labs   Lab 02/19/24 0538 02/19/24  1500   TROPHS 6 9       Imaging: Imaging data reviewed in Epic.    Medications:    pantoprazole  40 mg Oral QAM AC    potassium chloride  40 mEq Oral Once    clonazePAM  0.5 mg Oral BID    sertraline  50 mg Oral Daily    amLODIPine  10 mg Oral Daily    [Held by provider] heparin  5,000 Units Subcutaneous 2 times per day       Assessment & Plan:    Chest pain, non-cardiac, trop neg  Essential hypertension  Resume Norvasc   Resume Losartan  Hydralazine PRN  Monitor hemodynamics  Dysphagia sp EGD 2/19 with circumferential ulceration at GE junction, some retained fluid without retained solid material. Tight esophageal sphincter, achalasia? Manometry with achalasia type 2 on 2/21/2024, LISANDRA sp balloon dilatation and Botox injection 2/23/2024  Isordil  PPI  Carafate  Follow-up pathology   GI consult   Anxiety  Klonopin PRN  Zoloft taper  Stop Ativan IV PRN  Hypercalcemia, resolved    Home today if cleared by GI.    Plan of care discussed with patient and RN.    Alexx Victoria MD

## 2024-02-23 NOTE — PLAN OF CARE
Assumed care at 0730.  A&O 4.  Room air.  Low fiber/soft diet.  Tele- NSR/ST.  VTE- Heparin on auto hold by MD. See MAR and managed orders for more details.   Non cardiac electrolyte replacement protocol. Potassium (3.4) replaced this shift. See MAR and managed orders for more details.   SW following for dc needs. See notes for more details.   Up ad cheyanne.    Pt oriented to the room, safety prec initiated, bed is in the lowest position and call light within reach. Pt updated on the plan of care and awaiting discharge. All needs met at this time.     Problem: GASTROINTESTINAL - ADULT  Goal: Minimal or absence of nausea and vomiting  Description: INTERVENTIONS:  - Maintain adequate hydration with IV or PO as ordered and tolerated  - Nasogastric tube to low intermittent suction as ordered  - Evaluate effectiveness of ordered antiemetic medications  - Provide nonpharmacologic comfort measures as appropriate  - Advance diet as tolerated, if ordered  - Obtain nutritional consult as needed  - Evaluate fluid balance  Outcome: Progressing  Goal: Maintains or returns to baseline bowel function  Description: INTERVENTIONS:  - Assess bowel function  - Maintain adequate hydration with IV or PO as ordered and tolerated  - Evaluate effectiveness of GI medications  - Encourage mobilization and activity  - Obtain nutritional consult as needed  - Establish a toileting routine/schedule  - Consider collaborating with pharmacy to review patient's medication profile  Outcome: Progressing

## 2024-02-23 NOTE — OPERATIVE REPORT
Mago Tovar Patient Status:  Inpatient    3/31/1985 MRN [id number]   [facility] ENDOSCOPY PAIN CENTER Attending Alexx Victoria MD   Date 2024 PCP None Pcp     PREOPERATIVE DIAGNOSIS/INDICATION: Esophageal motility disorder, dysphagia, abnormal manometry reviewed by me suggestive of EGJOO, not meeting criteria for Achalasia, Nutcracker esophagus or LISANDRA  POSTOPERTATIVE DIAGNOSIS: Changes suggestive of LISANDRA or developing type III achalasia/nutcracker esophagus  PROCEDURE PERFORMED: EGD with balloon dilatation 20 mm and Botox injection  TIME OUT WAS PERFORMED    SEDATION: MAC sedation provided by General Anesthesia    INFORMED CONSENT: Risks, benefits and alternatives to the procedure were explained to the patient including but not limited to bleeding, infection, perforation, adverse drug reactions, pancreatitis and the need for hospitalization and surgery if this occurs, the patient understands and agrees to procedure.  PROCEDURE DESCRIPTION: A regular upper endoscope was introduced into the patient’s mouth, hypo pharynx, esophagus, stomach and the first and second portion of the duodenum, retroflexion of the endoscope was performed in the stomach. Careful examination of the above described areas was performed on withdrawal of the endoscope. The patient tolerated the procedure well, there were no immediate complication immediately following the procedure, and the patient was transferred to recovery in stable condition.  FINDINGS:  ESOPHAGUS: Mid/distal esophageal vigorous spasm, small amount of fluid residue  EGJ: Small hiatal hernia  STOMACH: Normal  DUODENUM: Normal  THERAPEUTICS: Esophageal ring balloon dilatation through the scope with CRE fixed wire balloon size 18-19-20 mm mucosal inspection was performed after each dilatation diameter, no mucosal break noted, no complications, perforation of bleeding noted, Botox 100 units total was injected in a four quadrant fashion at the  area of spasm, luminal tightness  RECOMMENDATIONS:   Post EGD precautions, watch for bleeding, infection, perforation, adverse drug reactions   Trial of clears and advance to soft as tolerated  OV follow up as outpatient  Consider referral to tertiary center with motility capabilities  Repeat high resolution manometry as outpatient, if meeting criteria for LISANDRA or Type III achalasia consider TA Roa MD  2/22/2024  6:32 PM

## 2024-02-23 NOTE — PLAN OF CARE
Patient A&O x4, room air, no c/o pain.  Patient has been able to swallow much better.  She took all her medication with out issue, and held down clear liquid diet.  She advanced to a cup of icecream and was able to swallow without a gag or emesis.  Patient will try soft diet in the morning.

## 2024-02-24 ENCOUNTER — HOSPITAL ENCOUNTER (INPATIENT)
Facility: HOSPITAL | Age: 39
LOS: 3 days | Discharge: HOME | DRG: 328 | End: 2024-03-02
Attending: EMERGENCY MEDICINE | Admitting: HOSPITALIST
Payer: MEDICAID

## 2024-02-24 ENCOUNTER — APPOINTMENT (OUTPATIENT)
Dept: GENERAL RADIOLOGY | Facility: HOSPITAL | Age: 39
End: 2024-02-24
Attending: EMERGENCY MEDICINE
Payer: MEDICAID

## 2024-02-24 ENCOUNTER — HOSPITAL ENCOUNTER (INPATIENT)
Facility: HOSPITAL | Age: 39
LOS: 3 days | Discharge: HOME | End: 2024-03-02
Attending: EMERGENCY MEDICINE | Admitting: HOSPITALIST
Payer: MEDICAID

## 2024-02-24 ENCOUNTER — APPOINTMENT (OUTPATIENT)
Dept: GENERAL RADIOLOGY | Facility: HOSPITAL | Age: 39
DRG: 328 | End: 2024-02-24
Attending: EMERGENCY MEDICINE
Payer: MEDICAID

## 2024-02-24 DIAGNOSIS — R07.89 CHEST PAIN, ATYPICAL: Primary | ICD-10-CM

## 2024-02-24 DIAGNOSIS — K22.0 ACHALASIA: ICD-10-CM
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
## 2024-02-24 DIAGNOSIS — E87.6 HYPOKALEMIA: ICD-10-CM

## 2024-02-24 DIAGNOSIS — T18.128A ESOPHAGEAL OBSTRUCTION DUE TO FOOD IMPACTION: ICD-10-CM

## 2024-02-24 DIAGNOSIS — R11.10 INTRACTABLE VOMITING: ICD-10-CM

## 2024-02-24 LAB
ANION GAP SERPL CALC-SCNC: 4 MMOL/L (ref 0–18)
BASOPHILS # BLD AUTO: 0.05 X10(3) UL (ref 0–0.2)
BASOPHILS NFR BLD AUTO: 0.6 %
BUN BLD-MCNC: 11 MG/DL (ref 9–23)
CALCIUM BLD-MCNC: 9.8 MG/DL (ref 8.5–10.1)
CHLORIDE SERPL-SCNC: 109 MMOL/L (ref 98–112)
CO2 SERPL-SCNC: 27 MMOL/L (ref 21–32)
CREAT BLD-MCNC: 0.91 MG/DL
EGFRCR SERPLBLD CKD-EPI 2021: 82 ML/MIN/1.73M2 (ref 60–?)
EOSINOPHIL # BLD AUTO: 0.23 X10(3) UL (ref 0–0.7)
EOSINOPHIL NFR BLD AUTO: 3 %
ERYTHROCYTE [DISTWIDTH] IN BLOOD BY AUTOMATED COUNT: 14.6 %
GLUCOSE BLD-MCNC: 99 MG/DL (ref 70–99)
HCT VFR BLD AUTO: 42.6 %
HGB BLD-MCNC: 13.6 G/DL
IMM GRANULOCYTES # BLD AUTO: 0.02 X10(3) UL (ref 0–1)
IMM GRANULOCYTES NFR BLD: 0.3 %
LYMPHOCYTES # BLD AUTO: 3.51 X10(3) UL (ref 1–4)
LYMPHOCYTES NFR BLD AUTO: 45.4 %
MCH RBC QN AUTO: 26.2 PG (ref 26–34)
MCHC RBC AUTO-ENTMCNC: 31.9 G/DL (ref 31–37)
MCV RBC AUTO: 81.9 FL
MONOCYTES # BLD AUTO: 0.45 X10(3) UL (ref 0.1–1)
MONOCYTES NFR BLD AUTO: 5.8 %
NEUTROPHILS # BLD AUTO: 3.47 X10 (3) UL (ref 1.5–7.7)
NEUTROPHILS # BLD AUTO: 3.47 X10(3) UL (ref 1.5–7.7)
NEUTROPHILS NFR BLD AUTO: 44.9 %
OSMOLALITY SERPL CALC.SUM OF ELEC: 289 MOSM/KG (ref 275–295)
PLATELET # BLD AUTO: 304 10(3)UL (ref 150–450)
POTASSIUM SERPL-SCNC: 3.3 MMOL/L (ref 3.5–5.1)
RBC # BLD AUTO: 5.2 X10(6)UL
SODIUM SERPL-SCNC: 140 MMOL/L (ref 136–145)
WBC # BLD AUTO: 7.7 X10(3) UL (ref 4–11)

## 2024-02-24 PROCEDURE — 71045 X-RAY EXAM CHEST 1 VIEW: CPT | Performed by: EMERGENCY MEDICINE

## 2024-02-24 PROCEDURE — 99223 1ST HOSP IP/OBS HIGH 75: CPT | Performed by: HOSPITALIST

## 2024-02-24 RX ORDER — POTASSIUM CHLORIDE 14.9 MG/ML
20 INJECTION INTRAVENOUS ONCE
Status: COMPLETED | OUTPATIENT
Start: 2024-02-24 | End: 2024-02-25

## 2024-02-25 ENCOUNTER — ANESTHESIA (OUTPATIENT)
Dept: ENDOSCOPY | Facility: HOSPITAL | Age: 39
End: 2024-02-25
Payer: MEDICAID

## 2024-02-25 ENCOUNTER — ANESTHESIA EVENT (OUTPATIENT)
Dept: ENDOSCOPY | Facility: HOSPITAL | Age: 39
End: 2024-02-25
Payer: MEDICAID

## 2024-02-25 PROBLEM — K22.0 ACHALASIA: Status: ACTIVE | Noted: 2024-02-25

## 2024-02-25 PROBLEM — E87.6 HYPOKALEMIA: Status: ACTIVE | Noted: 2024-02-25

## 2024-02-25 PROBLEM — R11.10 INTRACTABLE VOMITING: Status: ACTIVE | Noted: 2024-02-25

## 2024-02-25 PROBLEM — K21.9 ESOPHAGEAL REFLUX: Chronic | Status: ACTIVE | Noted: 2024-02-25

## 2024-02-25 LAB
ATRIAL RATE: 102 BPM
P AXIS: 76 DEGREES
P-R INTERVAL: 136 MS
POTASSIUM SERPL-SCNC: 3.7 MMOL/L (ref 3.5–5.1)
Q-T INTERVAL: 350 MS
QRS DURATION: 74 MS
QTC CALCULATION (BEZET): 456 MS
R AXIS: 77 DEGREES
T AXIS: 252 DEGREES
TROPONIN I SERPL HS-MCNC: 8 NG/L
VENTRICULAR RATE: 102 BPM

## 2024-02-25 PROCEDURE — 99232 SBSQ HOSP IP/OBS MODERATE 35: CPT | Performed by: HOSPITALIST

## 2024-02-25 PROCEDURE — 0DJ08ZZ INSPECTION OF UPPER INTESTINAL TRACT, VIA NATURAL OR ARTIFICIAL OPENING ENDOSCOPIC: ICD-10-PCS | Performed by: INTERNAL MEDICINE

## 2024-02-25 RX ORDER — ACETAMINOPHEN 500 MG
500 TABLET ORAL EVERY 4 HOURS PRN
Status: DISCONTINUED | OUTPATIENT
Start: 2024-02-25 | End: 2024-03-02

## 2024-02-25 RX ORDER — LIDOCAINE HYDROCHLORIDE 10 MG/ML
INJECTION, SOLUTION EPIDURAL; INFILTRATION; INTRACAUDAL; PERINEURAL AS NEEDED
Status: DISCONTINUED | OUTPATIENT
Start: 2024-02-25 | End: 2024-02-25 | Stop reason: SURG

## 2024-02-25 RX ORDER — ENALAPRILAT 1.25 MG/ML
0.62 INJECTION INTRAVENOUS EVERY 6 HOURS
Status: DISCONTINUED | OUTPATIENT
Start: 2024-02-25 | End: 2024-02-27

## 2024-02-25 RX ORDER — MORPHINE SULFATE 4 MG/ML
4 INJECTION, SOLUTION INTRAMUSCULAR; INTRAVENOUS EVERY 2 HOUR PRN
Status: DISCONTINUED | OUTPATIENT
Start: 2024-02-25 | End: 2024-02-28

## 2024-02-25 RX ORDER — DEXTROSE AND SODIUM CHLORIDE 5; .45 G/100ML; G/100ML
INJECTION, SOLUTION INTRAVENOUS CONTINUOUS
Status: DISCONTINUED | OUTPATIENT
Start: 2024-02-25 | End: 2024-02-25

## 2024-02-25 RX ORDER — DIAZEPAM 5 MG/ML
2.5 INJECTION, SOLUTION INTRAMUSCULAR; INTRAVENOUS EVERY 4 HOURS PRN
Status: DISCONTINUED | OUTPATIENT
Start: 2024-02-25 | End: 2024-03-01

## 2024-02-25 RX ORDER — SODIUM CHLORIDE 9 MG/ML
INJECTION, SOLUTION INTRAVENOUS CONTINUOUS
Status: DISCONTINUED | OUTPATIENT
Start: 2024-02-25 | End: 2024-02-25

## 2024-02-25 RX ORDER — MORPHINE SULFATE 2 MG/ML
2 INJECTION, SOLUTION INTRAMUSCULAR; INTRAVENOUS EVERY 2 HOUR PRN
Status: DISCONTINUED | OUTPATIENT
Start: 2024-02-25 | End: 2024-02-28

## 2024-02-25 RX ORDER — ONDANSETRON 2 MG/ML
4 INJECTION INTRAMUSCULAR; INTRAVENOUS ONCE
Status: COMPLETED | OUTPATIENT
Start: 2024-02-25 | End: 2024-02-25

## 2024-02-25 RX ORDER — HYOSCYAMINE SULFATE 0.125 MG
0.12 TABLET,DISINTEGRATING ORAL EVERY 4 HOURS PRN
Status: DISCONTINUED | OUTPATIENT
Start: 2024-02-25 | End: 2024-02-26

## 2024-02-25 RX ORDER — SODIUM CHLORIDE, SODIUM LACTATE, POTASSIUM CHLORIDE, CALCIUM CHLORIDE 600; 310; 30; 20 MG/100ML; MG/100ML; MG/100ML; MG/100ML
INJECTION, SOLUTION INTRAVENOUS CONTINUOUS
Status: DISCONTINUED | OUTPATIENT
Start: 2024-02-25 | End: 2024-02-25

## 2024-02-25 RX ORDER — DIAZEPAM 5 MG/ML
5 INJECTION, SOLUTION INTRAMUSCULAR; INTRAVENOUS ONCE
Status: COMPLETED | OUTPATIENT
Start: 2024-02-25 | End: 2024-02-25

## 2024-02-25 RX ORDER — ONDANSETRON 2 MG/ML
4 INJECTION INTRAMUSCULAR; INTRAVENOUS EVERY 6 HOURS PRN
Status: DISCONTINUED | OUTPATIENT
Start: 2024-02-25 | End: 2024-03-02

## 2024-02-25 RX ORDER — MELATONIN
3 NIGHTLY PRN
Status: DISCONTINUED | OUTPATIENT
Start: 2024-02-25 | End: 2024-03-02

## 2024-02-25 RX ORDER — SODIUM CHLORIDE, SODIUM LACTATE, POTASSIUM CHLORIDE, CALCIUM CHLORIDE 600; 310; 30; 20 MG/100ML; MG/100ML; MG/100ML; MG/100ML
INJECTION, SOLUTION INTRAVENOUS CONTINUOUS PRN
Status: DISCONTINUED | OUTPATIENT
Start: 2024-02-25 | End: 2024-02-25 | Stop reason: SURG

## 2024-02-25 RX ORDER — HYDRALAZINE HYDROCHLORIDE 20 MG/ML
10 INJECTION INTRAMUSCULAR; INTRAVENOUS EVERY 6 HOURS PRN
Status: DISCONTINUED | OUTPATIENT
Start: 2024-02-25 | End: 2024-03-02

## 2024-02-25 RX ORDER — NALOXONE HYDROCHLORIDE 0.4 MG/ML
0.08 INJECTION, SOLUTION INTRAMUSCULAR; INTRAVENOUS; SUBCUTANEOUS ONCE AS NEEDED
Status: DISCONTINUED | OUTPATIENT
Start: 2024-02-25 | End: 2024-02-25 | Stop reason: HOSPADM

## 2024-02-25 RX ORDER — MORPHINE SULFATE 2 MG/ML
1 INJECTION, SOLUTION INTRAMUSCULAR; INTRAVENOUS EVERY 2 HOUR PRN
Status: DISCONTINUED | OUTPATIENT
Start: 2024-02-25 | End: 2024-02-28

## 2024-02-25 RX ADMIN — SODIUM CHLORIDE, SODIUM LACTATE, POTASSIUM CHLORIDE, CALCIUM CHLORIDE: 600; 310; 30; 20 INJECTION, SOLUTION INTRAVENOUS at 14:47:00

## 2024-02-25 RX ADMIN — LIDOCAINE HYDROCHLORIDE 50 MG: 10 INJECTION, SOLUTION EPIDURAL; INFILTRATION; INTRACAUDAL; PERINEURAL at 14:56:00

## 2024-02-25 NOTE — PROGRESS NOTES
[facility]     Hospitalist Progress Note     Mago Tovar Patient Status:  Observation    3/31/1985 MRN [id number]   Location [facility] 2NE-A Attending Alexx Victoria MD   Hosp Day # 0 PCP None Pcp     Chief Complaint: Dysphagia    Subjective:   Patient states she went home then had chicken and potatoes and felt food get stuck, vomited. Tried eggs yesterday, vomited. Admits to pain. BM yesterday.    Current medications:   enalaprilat  0.625 mg Intravenous Q6H    pantoprazole  40 mg Intravenous Q12H       Objective:    Review of Systems:   10 point ROS completed and was negative, except for pertinent positive and negatives stated in subjective.    Vital signs:  Temp:  [98.3 °F (36.8 °C)-98.6 °F (37 °C)] 98.6 °F (37 °C)  Pulse:  [] 99  Resp:  [12-18] 16  BP: (107-170)/() 107/52  SpO2:  [95 %-98 %] 98 %  Patient Weight for the past 72 hrs:   Weight   24 2043 200 lb (90.7 kg)   24 0110 198 lb 3.1 oz (89.9 kg)     Physical Exam:    General: No acute distress.   Respiratory: Clear to auscultation bilaterally  Cardiovascular: S1, S2. Regular rate and rhythm.   Abdomen: Soft, nontender, nondistended.  Positive bowel sounds.  Extremities: No edema.  Neuro: AAOx3    Diagnostic Data:    Labs:  Recent Labs   Lab 24  0538 24  1034 24  0747 24  2208   WBC 9.6 6.5 6.6 7.7   HGB 14.2 12.1 13.3 13.6   MCV 81.0 81.6 79.2* 81.9   .0 231.0 279.0 304.0       Recent Labs   Lab 24  0538 24  1034 24  2121 24  0747 24  0837 24  2208   * 95  --  104*  --  99   BUN 13 13  --  8*  --  11   CREATSERUM 0.98 0.67  --  0.81  --  0.91   CA 10.2* 8.4*  --  8.7  --  9.8   ALB 4.1  --   --   --   --   --     140  --  137  --  140   K 3.3* 2.9*  2.9*   < > 3.4* 3.4* 3.3*    109  --  110  --  109   CO2 24.0 24.0  --  23.0  --  27.0   ALKPHO 106*  --   --   --   --   --    AST 17  --   --   --   --   --    ALT 19  --    --   --   --   --    BILT 0.3  --   --   --   --   --    TP 9.0*  --   --   --   --   --     < > = values in this interval not displayed.       Estimated Creatinine Clearance: 65 mL/min (based on SCr of 0.91 mg/dL).    No results for input(s): \"PTP\", \"INR\" in the last 168 hours.         COVID-19 Lab Results    COVID-19  Lab Results   Component Value Date    COVID19 Not Detected 02/19/2024    COVID19 NOT DETECTED 12/09/2021       Pro-Calcitonin  No results for input(s): \"PCT\" in the last 168 hours.    Cardiac  No results for input(s): \"TROP\", \"PBNP\" in the last 168 hours.    Creatinine Kinase  No results for input(s): \"CK\" in the last 168 hours.    Inflammatory Markers  Recent Labs   Lab 02/19/24  0538   DDIMER 0.45       Recent Labs   Lab 02/19/24  0538 02/19/24  1500 02/24/24  2208   TROPHS 6 9 8       Imaging: Imaging data reviewed in Epic.    Medications:    enalaprilat  0.625 mg Intravenous Q6H    pantoprazole  40 mg Intravenous Q12H       Assessment & Plan:      Dysphagia sp EGD 2/19 with circumferential ulceration at GE junction, some retained fluid without retained solid material. Tight esophageal sphincter, achalasia? Manometry with achalasia type 2 on 2/21/2024, LISANDRA sp balloon dilatation and Botox injection 2/23/2024  NPO  IVF  PPI  Endoscopic evaluation today  GI consult   Essential hypertension  Vasotec IV, hold PTA Norvasc & Losartan  Monitor hemodynamics   Anxiety  Valium PRN    Supplementary Documentation:   Quality:  DVT Prophylaxis: SCDs    At this point Ms. Tovar is expected to be discharge to: Home    Plan of care discussed with patient and RN.    Alexx Victoria MD

## 2024-02-25 NOTE — PROGRESS NOTES
02/25/24 0106 02/25/24 0107 02/25/24 0108   Vital Signs   Pulse 83 83 79   Heart Rate Source Monitor  --   --    Resp 16 14 13   BP (!) 166/114 (!) 160/114 (!) 170/126   MAP (mmHg) (!) 127 (!) 127 (!) 140   BP Location Right arm  --   --    BP Method Automatic  --   --    Patient Position Lying Lying Sitting     Pt reports mild dizziness when standing.

## 2024-02-25 NOTE — ED INITIAL ASSESSMENT (HPI)
Pt ambulatory to ED with trouble swallowing. Pt with recent hospital stay for same symptoms. Admitted Monday and discharged yesterday. Seen by Dr Roa, had procedure done to open up esophagus. Pt now with same symptoms as Monday, with difficulty swallowing, increase in regurgitation and abdominal pain. +n/v. Denies sob. Easy work of breathing, airway intact.

## 2024-02-25 NOTE — CONSULTS
[facility]                       Gastroenterology Consultation-[facility] Gastroenterology    [facility] Aurea Tovar Patient Status:  Observation    3/31/1985 MRN [id number]   Location [facility] 2NE-A Attending Alexx Victoria MD   Hosp Day # 0 PCP None Pcp     Reason for consultation: Dysphagia  HPI: Ms. Tovar is a 39 y/o with a PMHx of GERD, achalasia s/p EGD with balloon dilation, hypertension and  anxiety who presented to the ED yesterday with reports of difficulty swallowing. She was admitted on 24 with progressive dysphagia; UGI esophogram and esophageal manometry performed by Dr. Hart noted manometric changes consistent with achalasia type II, EGD with balloon dilation and Botox performed on 24 with Dr. Roa.   She reports eating mash potatoes and boneless chicken breast for dinner on Friday night. Saturday morning she reports choking episode and emesis consisting of a dark substance, pain radiating from throat to epigastric area. She attempted tp eat eggs and water which worsened the pain; continuing through Saturday night unrelieved with Protonix. She reports dysphagia and odynophagia; unable to tolerate fluids, reporting swallowing saliva hurts. She does not take anticoagulants, use excessive NSAIDs, consume alcohol or illicit drugs.   Endoscopic History:  24 EGD w/ balloon dilation and Botox with Dr. Roa  FINDINGS:  ESOPHAGUS: Mid/distal esophageal vigorous spasm, small amount of fluid residue  EGJ: Small hiatal hernia  STOMACH: Normal  DUODENUM: Normal  THERAPEUTICS: Esophageal ring balloon dilatation through the scope with CRE fixed wire balloon size 18-19-20 mm mucosal inspection was performed after each dilatation diameter, no mucosal break noted, no complications, perforation of bleeding noted, Botox 100 units total was injected in a four quadrant fashion at the area of spasm, luminal tightness  24 Esophageal Manometry with Dr. Hart  POSTOPERATIVE  DIAGNOSIS:  Manometric changes consistent with achalasia, type II.  Pan-hypopressurization of 100% of the esophageal contractions.  Elevated basal IRP / LES pressure at 59.  Residual pressure was 8, though this seems to me artifactually low related to movement of the LES pressure probe into the hiatal hernia sac.    PMHx:   Past Medical History:   Diagnosis Date    Anxiety     Esophageal reflux 2/25/2024    Essential hypertension                 PSHx:   Past Surgical History:   Procedure Laterality Date    APPENDECTOMY      REMOVAL GALLBLADDER      TOTAL ABDOM HYSTERECTOMY       Medications:    melatonin tab 3 mg  3 mg Oral Nightly PRN    acetaminophen (Tylenol Extra Strength) tab 500 mg  500 mg Oral Q4H PRN    ondansetron (Zofran) 4 MG/2ML injection 4 mg  4 mg Intravenous Q6H PRN    [COMPLETED] diazepam (Valium) 5 mg/mL injection 5 mg  5 mg Intravenous Once    [COMPLETED] ondansetron (Zofran) 4 MG/2ML injection 4 mg  4 mg Intravenous Once    morphINE PF 2 MG/ML injection 1 mg  1 mg Intravenous Q2H PRN    Or    morphINE PF 2 MG/ML injection 2 mg  2 mg Intravenous Q2H PRN    Or    morphINE PF 4 MG/ML injection 4 mg  4 mg Intravenous Q2H PRN    diazepam (Valium) 5 mg/mL injection 2.5 mg  2.5 mg Intravenous Q4H PRN    pantoprazole (Protonix) 40 mg in sodium chloride 0.9% PF 10 mL IV push  40 mg Intravenous Q24H    enalaprilat (Vasotec) 1.25 mg/mL injection 0.625 mg  0.625 mg Intravenous Q6H    dextrose 5%-sodium chloride 0.45% infusion   Intravenous Continuous    [COMPLETED] sodium chloride 0.9 % IV bolus 1,000 mL  1,000 mL Intravenous Once    [COMPLETED] potassium chloride 20 mEq/100mL IVPB premix 20 mEq  20 mEq Intravenous Once     Allergies:   Allergies   Allergen Reactions    Ketorolac Tromethamine HIVES    Metoclopramide SHORTNESS OF BREATH    Naproxen HIVES    Prochlorperazine SHORTNESS OF BREATH     Social HX:   Social History     Socioeconomic History    Marital status: Single   Tobacco Use    Smoking status:  Never   Vaping Use    Vaping Use: Never used   Substance and Sexual Activity    Alcohol use: Yes     Comment: social    Drug use: Never     Social Determinants of Health     Food Insecurity: Food Insecurity Present (2/25/2024)    Food Insecurity     Food Insecurity: Sometimes true   Transportation Needs: Unmet Transportation Needs (2/25/2024)    Transportation Needs     Lack of Transportation: Yes   Housing Stability: Low Risk  (2/25/2024)    Housing Stability     Housing Instability: No   Recent Concern: Housing Stability - Medium Risk (2/19/2024)    Housing Stability     Housing Instability: Yes      FamHx: The patient has no family history of colon cancer or other gastrointestinal malignancies;  No family history of ulcer disease, or inflammatory bowel disease  ROS:  In addition to the pertinent positives described above:            Infectious Disease:  No chronic infections or recent fevers prior to the acute illness            Cardiovascular: No history of CAD, prior MI, chest pain, or palpitations            Respiratory: No shortness of breath, asthma, copd, recurrent pneumonia            Hematologic: The patient reports no easy bruising, frequent gum bleeding or nose bleeding;  The patient has no history of known chronic anemia            Dermatologic: The patient reports no recent rashes or chronic skin disorders            Rheumatologic: The patient reports no history of chronic arthritis, myalgias, arthralgias            Genitourinary:  The patient reports no history of recurrent urinary tract infections, hematuria, dysuria, or nephrolithiasis           Psychiatric: The patient reports history of anxiety, no depression, suicidal ideation, or homicidal ideation           Oncologic: The patient reports no history of prior solid tumor or hematologic malignancy           ENT: The patient reports no hoarseness of voice, hearing loss, sinus congestion, tinnitus           Neurologic: The patient reports no  history of seizure, stroke, or frequent headaches  PE: BP (!) 145/100 (BP Location: Right arm)   Pulse 78   Temp 98.4 °F (36.9 °C) (Oral)   Resp 14   Ht 5' 2\" (1.575 m)   Wt 198 lb 3.1 oz (89.9 kg)   SpO2 95%   BMI 36.25 kg/m²   Gen: AAO x 3, able to speak in complete sentences  HENT: EOMI, PERRL, oropharynx is clear with moist mucosal membranes  Eyes: Sclerae are anicteric  Neck:  Supple without nuchal rigidity  CV: Regular rate and rhythm, with normal S1 and S2  Resp: Clear to auscultation bilaterally without wheezes; rubs, rhonchi, or rales  Abdomen: Soft, epigastric tenderness, non-distended with the presence of bowel sounds; No hepatosplenomegaly; no rebound or guarding; No ascites is clinically apparent; no tympany to percussion  Ext: No peripheral edema or cyanosis  Skin: Warm and dry  Psychiatric: Appropriate mood and congruent affect without obvious depression or anxiety  Labs:   Lab Results   Component Value Date    WBC 7.7 02/24/2024    HGB 13.6 02/24/2024    HCT 42.6 02/24/2024    .0 02/24/2024    CREATSERUM 0.91 02/24/2024    BUN 11 02/24/2024     02/24/2024    K 3.3 02/24/2024     02/24/2024    CO2 27.0 02/24/2024    GLU 99 02/24/2024    CA 9.8 02/24/2024     Recent Labs   Lab 02/20/24  1034 02/20/24  2121 02/22/24  0747 02/23/24  0837 02/24/24  2208   GLU 95  --  104*  --  99   BUN 13  --  8*  --  11   CREATSERUM 0.67  --  0.81  --  0.91   CA 8.4*  --  8.7  --  9.8     --  137  --  140   K 2.9*  2.9*   < > 3.4* 3.4* 3.3*     --  110  --  109   CO2 24.0  --  23.0  --  27.0    < > = values in this interval not displayed.     Recent Labs   Lab 02/24/24  2208   RBC 5.20   HGB 13.6   HCT 42.6   MCV 81.9   MCH 26.2   MCHC 31.9   RDW 14.6   NEPRELIM 3.47   WBC 7.7   .0       Recent Labs   Lab 02/19/24  0538   ALT 19   AST 17                   Imaging:   Chest XR:  Impression   CONCLUSION:       Stable cardiac and mediastinal contours.  No pulmonary edema or focal  airspace consolidation.  The pleural spaces are clear.     Impression: Ms. Tovar is a 37 y/o with a PMHx of GERD, achalasia s/p EGD with balloon dilation, hypertension and  anxiety who presented to the ED yesterday with reports of difficulty swallowing. She was admitted on 2/19/24 with progressive dysphagia; UGI esophogram and esophageal manometry performed by Dr. Hart noted manometric changes consistent with achalasia type II, s/p EGD with balloon dilation and Botox performed on 2/22/24 with Dr. Roa.   Dysphagia/odynophagia- Hx of  achalasia type II,          Will repeat EGD to r/o food bolus vs stricture    The risks, benefits, alternatives of the procedure including the risks of anesthesia, bleeding, perforation, missed lesions, need for surgery were discussed with the patient. She expressed understanding of the risks and was agreeable to proceed. Plan for EGD in am.       Recommendations:     Strict NPO  IV hydration  PPI IV BID  EGD w/MAC today with  Dr. Wagner      Thank you for the consultation, we will follow the patient with you.  Attending addendum Dr. Wagner to follow later today and provide formal, final recommendations at that time   ZENY Aldana  7:23 AM  2/25/2024  [facility] Gastroenterology  [phone]    GI attending addendum:    I have personally seen and examined this patient and agree with above nurse practitioner's assessment and recommendations. 37 y/o female with a h/o esophageal motility d/o s/p recent dilation, BOTOX injection, admitted with worsening heartburn, substernal chest pain, dysphagia and feeling like food stuck in distal esophagus. Ate chicken with mashed potatoes sat night. Did well for 24 hours post dilation. On exam, appears comfortable in minimal distress. Abdomen soft non-distended, non-tender. Esophageal food impaction vs. Pill esophagitis vs. Reflux esophagitis. Recommend endoscopy today. R/b/A discussed with pt, she agreed to  proceed    Dat Wagner MD  1:57 PM  2/25/2024  [facility] Gastroenterology  [phone]

## 2024-02-25 NOTE — PLAN OF CARE
Pt alert and oriented x 4. Pt on tele in NSR. Pt on room air. Pt denies any c/o of chest pain or SOB. Pt continent of bowel and bladder. Pt endorsed pain in the abdomen, relieved by PRN pain medication. Pt updated on plan of care, pt verbalizes understanding. Bed in lowest position and call light within reach.     Plan: EGD, strict NPO.   Consent signed and in pt chart      Problem: Patient/Family Goals  Goal: Patient/Family Long Term Goal  Description: Patient's Long Term Goal: Back home     Interventions:  - GI to see  - See additional Care Plan goals for specific interventions  Outcome: Progressing  Goal: Patient/Family Short Term Goal  Description: Patient's Short Term Goal: Feel better    Interventions:   - Medication management  - See additional Care Plan goals for specific interventions  Outcome: Progressing     Problem: GASTROINTESTINAL - ADULT  Goal: Minimal or absence of nausea and vomiting  Description: INTERVENTIONS:  - Maintain adequate hydration with IV or PO as ordered and tolerated  - Nasogastric tube to low intermittent suction as ordered  - Evaluate effectiveness of ordered antiemetic medications  - Provide nonpharmacologic comfort measures as appropriate  - Advance diet as tolerated, if ordered  - Obtain nutritional consult as needed  - Evaluate fluid balance  Outcome: Progressing  Goal: Maintains or returns to baseline bowel function  Description: INTERVENTIONS:  - Assess bowel function  - Maintain adequate hydration with IV or PO as ordered and tolerated  - Evaluate effectiveness of GI medications  - Encourage mobilization and activity  - Obtain nutritional consult as needed  - Establish a toileting routine/schedule  - Consider collaborating with pharmacy to review patient's medication profile  Outcome: Progressing  Goal: Maintains adequate nutritional intake (undernourished)  Description: INTERVENTIONS:  - Monitor percentage of each meal consumed  - Identify factors contributing to decreased  intake, treat as appropriate  - Assist with meals as needed  - Monitor I&O, WT and lab values  - Obtain nutritional consult as needed  - Optimize oral hygiene and moisture  - Encourage food from home; allow for food preferences  - Enhance eating environment  Outcome: Progressing  Goal: Achieves appropriate nutritional intake (bariatric)  Description: INTERVENTIONS:  - Monitor for over-consumption  - Identify factors contributing to increased intake, treat as appropriate  - Monitor I&O, WT and lab values  - Obtain nutritional consult as needed  - Evaluate psychosocial factors contributing to over-consumption  Outcome: Progressing     Problem: PAIN - ADULT  Goal: Verbalizes/displays adequate comfort level or patient's stated pain goal  Description: INTERVENTIONS:  - Encourage pt to monitor pain and request assistance  - Assess pain using appropriate pain scale  - Administer analgesics based on type and severity of pain and evaluate response  - Implement non-pharmacological measures as appropriate and evaluate response  - Consider cultural and social influences on pain and pain management  - Manage/alleviate anxiety  - Utilize distraction and/or relaxation techniques  - Monitor for opioid side effects  - Notify MD/LIP if interventions unsuccessful or patient reports new pain  - Anticipate increased pain with activity and pre-medicate as appropriate  Outcome: Progressing

## 2024-02-25 NOTE — ED QUICK NOTES
Orders for admission, patient is aware of plan and ready to go upstairs. Any questions, please call ED RN Felicia at extension [phone].     Patient Covid vaccination status: Fully vaccinated     COVID Test Ordered in ED: None    COVID Suspicion at Admission: N/A    Running Infusions:      Mental Status/LOC at time of transport: A&O x 4    Other pertinent information:   CIWA score: N/A   NIH score:  N/A

## 2024-02-25 NOTE — OPERATIVE REPORT
Operative Report-Esophagogastroduodenoscopy      PREOPERATIVE DIAGNOSIS/INDICATION:  Atypical chest pain  Dysphagia  Odynophagia  LISANDRA/Type III Achalasia  POSTOPERTATIVE DIAGNOSIS:  Retained oral secretions throughout esophagus  Resistance to passage of endoscope across GE junction  No esophagitis, food impaction  Normal stomach and duodenum  PROCEDURE PERFORMED: EGD  INFORMED CONSENT: Once a brief history and physical examination was performed, the risks, benefits and alternatives to the procedure were discussed with the patient and/or family and informed consent was obtained.  The risks of sedation, perforation, missed lesions and need for surgery were all discussed.  Patient expressed understanding of the risks and agreed to proceed.    PROCEDURE DESCRIPTION:  The patient was then brought to the endoscopy suite where his/her pulse, pulse oximetry and blood pressure were monitored. He/she was placed in the left lateral decubitus position and deep sedation was administered. Once adequate sedation was achieved, a bite block was placed and a lubricated tip of an Olympus video upper endoscope was inserted through the oropharynx and gently manipulated through the esophagus into the stomach and the distal duodenum. Upon withdrawal of the endoscope, careful visualization of the mucosa was performed.   FINDINGS:  ESOPHAGUS: Slightly tortuous in david, retained oral secretions, saliva throughout the esophagus  EGJ: Located at 38 cm, mildly edematous with resistance to passage of endoscope, otherwise  normal without esophagitis  STOMACH:Normal mucosa throughout  DUODENUM: Normal  THERAPEUTICS: None  RECOMMENDATIONS:   Post EGD precautions, watch for bleeding, infection, perforation, adverse drug reactions   Suspect symptoms related to underlying motility disorder  PPI daily  Full liquid diet  Referral to tertiary motility program as OP  Ok to dc if tolerating full liquids  Trial of Hyoscyamine q4h  for chest pain  .  CC Report:     Dat Wagner MD  2/25/2024  3:02 PM  None Pcp

## 2024-02-25 NOTE — ANESTHESIA PREPROCEDURE EVALUATION
PRE-OP EVALUATION    Patient Name: Mago Tovar    Admit Diagnosis: Hypokalemia [E87.6]  Intractable vomiting [R11.10]  Chest pain, atypical [R07.89]    Pre-op Diagnosis: Gi Bleeding    ESOPHAGOGASTRODUODENOSCOPY (EGD)    Anesthesia Procedure: ESOPHAGOGASTRODUODENOSCOPY (EGD)    Surgeon(s) and Role:     * Dat Wagner MD - Primary    Pre-op vitals reviewed.  Temp: 98.6 °F (37 °C)  Pulse: 99  Resp: 16  BP: 107/52  SpO2: 98 %  Body mass index is 36.25 kg/m².    Current medications reviewed.  Hospital Medications:  • melatonin tab 3 mg  3 mg Oral Nightly PRN   • acetaminophen (Tylenol Extra Strength) tab 500 mg  500 mg Oral Q4H PRN   • ondansetron (Zofran) 4 MG/2ML injection 4 mg  4 mg Intravenous Q6H PRN   • [COMPLETED] diazepam (Valium) 5 mg/mL injection 5 mg  5 mg Intravenous Once   • [COMPLETED] ondansetron (Zofran) 4 MG/2ML injection 4 mg  4 mg Intravenous Once   • morphINE PF 2 MG/ML injection 1 mg  1 mg Intravenous Q2H PRN    Or   • morphINE PF 2 MG/ML injection 2 mg  2 mg Intravenous Q2H PRN    Or   • morphINE PF 4 MG/ML injection 4 mg  4 mg Intravenous Q2H PRN   • diazepam (Valium) 5 mg/mL injection 2.5 mg  2.5 mg Intravenous Q4H PRN   • enalaprilat (Vasotec) 1.25 mg/mL injection 0.625 mg  0.625 mg Intravenous Q6H   • pantoprazole (Protonix) 40 mg in sodium chloride 0.9% PF 10 mL IV push  40 mg Intravenous Q12H   • sodium chloride 0.9% infusion   Intravenous Continuous   • [COMPLETED] sodium chloride 0.9 % IV bolus 1,000 mL  1,000 mL Intravenous Once   • [COMPLETED] potassium chloride 20 mEq/100mL IVPB premix 20 mEq  20 mEq Intravenous Once       Outpatient Medications:     Medications Prior to Admission   Medication Sig Dispense Refill Last Dose   • amLODIPine 10 MG Oral Tab Take 1 tablet (10 mg total) by mouth daily. 90 tablet 0 2/23/2024 at 0900   • losartan 100 MG Oral Tab Take 1 tablet (100 mg total) by mouth daily. 90 tablet 0 2/24/2024 at 0900   • clonazePAM 0.5 MG Oral Tab Take 1  tablet (0.5 mg total) by mouth 2 (two) times daily as needed for Anxiety. 30 tablet 0 Past Week   • pantoprazole 40 MG Oral Tab EC Take 1 tablet (40 mg total) by mouth every morning before breakfast. 30 tablet 3 2/23/2024 at 0900   • QUEtiapine 25 MG Oral Tab Take 1 tablet (25 mg total) by mouth nightly. 90 tablet 0 2/23/2024 at 2100   • sertraline 50 MG Oral Tab Take 1 tablet (50 mg total) by mouth daily. 30 tablet 1 2/24/2024 at 0900       Allergies: Ketorolac tromethamine, Metoclopramide, Naproxen, and Prochlorperazine      Anesthesia Evaluation    Patient summary reviewed.    Anesthetic Complications  (-) history of anesthetic complications         GI/Hepatic/Renal  Comment: Esophageal bolus     (+) GERD                           Cardiovascular        Exercise tolerance: good     MET: >4      (+) hypertension and well controlled                                    Endo/Other    Negative endo/other ROS.                              Pulmonary    Negative pulmonary ROS.                       Neuro/Psych        (+) anxiety                            Procedure Laterality Date   • APPENDECTOMY     • REMOVAL GALLBLADDER     • TOTAL ABDOM HYSTERECTOMY       Social History     Socioeconomic History   • Marital status: Single   Tobacco Use   • Smoking status: Never   Vaping Use   • Vaping Use: Never used   Substance and Sexual Activity   • Alcohol use: Yes     Comment: social   • Drug use: Never     History   Drug Use Unknown     Available pre-op labs reviewed.  Lab Results   Component Value Date    WBC 7.7 02/24/2024    RBC 5.20 02/24/2024    HGB 13.6 02/24/2024    HCT 42.6 02/24/2024    MCV 81.9 02/24/2024    MCH 26.2 02/24/2024    MCHC 31.9 02/24/2024    RDW 14.6 02/24/2024    .0 02/24/2024     Lab Results   Component Value Date     02/24/2024    K 3.7 02/25/2024     02/24/2024    CO2 27.0 02/24/2024    BUN 11 02/24/2024    CREATSERUM 0.91 02/24/2024    GLU 99 02/24/2024    CA 9.8 02/24/2024             Airway      Mallampati: II  Mouth opening: >3 FB  TM distance: 4 - 6 cm  Neck ROM: full Cardiovascular    Cardiovascular exam normal.         Dental    Dentition appears grossly intact         Pulmonary    Pulmonary exam normal.                 Other findings        ASA: 2   Plan: MAC  NPO status verified and patient meets guidelines.          Plan/risks discussed with: patient            Present on Admission:  • Intractable vomiting  • Anxiety  • Esophageal obstruction due to food impaction  • Primary hypertension  • Esophageal reflux  • Achalasia

## 2024-02-25 NOTE — ED PROVIDER NOTES
Patient Seen in: [facility] Emergency Department      History     Chief Complaint   Patient presents with    Swallowing Problem     Stated Complaint: states she just DC'd yesterday, dx Achalasia. Reports difficulty swallowing sxs*    Subjective:   HPI    38-year-old female past medical history of hypertension anxiety and achalasia who presents ED with complaints of difficulty swallowing.  Patient reports that last night she ate mashed potatoes went to sleep woke up in the morning feeling like she regurgitate her food.  Reports in the morning she tried to eat eggs was unable to tolerate them and threw them up as well.  Has been trying to drink fluids tried drinking water to take her Klonopin tonight and was unable to hold it down which is what prompted her to come to the ER.  She is concerned that she may have an obstruction again in her esophagus.  Also complaining of epigastric abdominal pain.  Patient reports that she feels exactly how she presented to the ED 2/19/2024.  Per chart review 2/19/2024 patient endoscopy completed by Dr. Hart who diagnosed her with hiatal hernia esophagitis and gastritis.  She was then referred to Dr. Roa who did EGD with balloon dilatation and Botox injection.    Objective:   Past Medical History:   Diagnosis Date    Anxiety     Esophageal reflux 2/25/2024    Essential hypertension               Past Surgical History:   Procedure Laterality Date    APPENDECTOMY      REMOVAL GALLBLADDER      TOTAL ABDOM HYSTERECTOMY                  Social History     Socioeconomic History    Marital status: Single   Tobacco Use    Smoking status: Never   Vaping Use    Vaping Use: Never used   Substance and Sexual Activity    Alcohol use: Yes     Comment: social    Drug use: Never     Social Determinants of Health     Food Insecurity: Food Insecurity Present (2/25/2024)    Food Insecurity     Food Insecurity: Sometimes true   Transportation Needs: Unmet Transportation Needs (2/25/2024)     Transportation Needs     Lack of Transportation: Yes   Housing Stability: Low Risk  (2/25/2024)    Housing Stability     Housing Instability: No   Recent Concern: Housing Stability - Medium Risk (2/19/2024)    Housing Stability     Housing Instability: Yes              Review of Systems    Positive for stated complaint: states she just DC'd yesterday, dx Achalasia. Reports difficulty swallowing sxs*  Other systems are as noted in HPI.  Constitutional and vital signs reviewed.      All other systems reviewed and negative except as noted above.    Physical Exam     ED Triage Vitals [02/24/24 2043]   BP (!) 155/104   Pulse 99   Resp 18   Temp 98.5 °F (36.9 °C)   Temp src Oral   SpO2 97 %   O2 Device None (Room air)       Current:BP (!) 145/100 (BP Location: Right arm)   Pulse 78   Temp 98.4 °F (36.9 °C) (Oral)   Resp 14   Ht 157.5 cm (5' 2\")   Wt 89.9 kg   SpO2 95%   BMI 36.25 kg/m²         Physical Exam  Vitals and nursing note reviewed.   Constitutional:       Appearance: She is well-developed.   HENT:      Head: Normocephalic and atraumatic.   Eyes:      Pupils: Pupils are equal, round, and reactive to light.   Cardiovascular:      Rate and Rhythm: Normal rate and regular rhythm.   Pulmonary:      Effort: Pulmonary effort is normal.      Breath sounds: Normal breath sounds.   Abdominal:      General: Bowel sounds are normal.      Palpations: Abdomen is soft.   Musculoskeletal:         General: No deformity.      Cervical back: Normal range of motion and neck supple.   Skin:     General: Skin is warm and dry.      Capillary Refill: Capillary refill takes less than 2 seconds.   Neurological:      Mental Status: She is alert and oriented to person, place, and time.               ED Course     Labs Reviewed   BASIC METABOLIC PANEL (8) - Abnormal; Notable for the following components:       Result Value    Potassium 3.3 (*)     All other components within normal limits   TROPONIN I HIGH SENSITIVITY - Normal   CBC  WITH DIFFERENTIAL WITH PLATELET    Narrative:     The following orders were created for panel order CBC With Differential With Platelet.  Procedure                               Abnormality         Status                     ---------                               -----------         ------                     CBC W/ DIFFERENTIAL[[id number]]                              Final result                 Please view results for these tests on the individual orders.   RAINBOW DRAW LAVENDER   RAINBOW DRAW LIGHT GREEN   RAINBOW DRAW BLUE   CBC W/ DIFFERENTIAL     EKG    Rate, intervals and axes as noted on EKG Report.  Rate: 102  Rhythm: Sinus Rhythm  Reading: no gross st elevations or depressions                 XR CHEST AP PORTABLE  (CPT=71045)    Result Date: 2/25/2024  CONCLUSION:   Stable cardiac and mediastinal contours.  No pulmonary edema or focal airspace consolidation.  The pleural spaces are clear.   LOCATION:  [facility]      Dictated by (CST): Jose Day MD on 2/25/2024 at 0:17 AM     Finalized by (CST): Jose Day MD on 2/25/2024 at 0:17 AM       XR UGI/ESOPHAGUS DOUBLE CONTRAST (CPT=74246)    Result Date: 2/20/2024  CONCLUSION:  There is a small to moderate hiatal hernia present.  There is spontaneous gastroesophageal reflux to the level of the upper thoracic esophagus.  Tertiary contractions within the esophagus suggestive of dysmotility.   LOCATION:  [facility]   Dictated by (CST): Dale Floyd MD on 2/20/2024 at 10:24 AM     Finalized by (CST): Dale Floyd MD on 2/20/2024 at 10:26 AM       CT ABDOMEN+PELVIS(CONTRAST ONLY)(CPT=74177)    Result Date: 2/19/2024  CONCLUSION:  1. Small hiatal hernia with diffuse esophageal wall thickening involving the visualized esophagus may represent reflux esophagitis, correlate clinically.   LOCATION:  [facility]   Dictated by (CST): Lis Willingham MD on 2/19/2024 at 4:35 PM     Finalized by (CST): Lis Willingham MD on 2/19/2024 at 4:39 PM       XR CHEST AP PORTABLE  (CPT=71045)    Result Date:  2/19/2024  CONCLUSION:  Normal examination for a patient of this age.    LOCATION:  [facility]      Dictated by (CST): Odilon Lowry MD on 2/19/2024 at 8:01 AM     Finalized by (CST): Odilon Lowry MD on 2/19/2024 at 8:02 AM               MDM      Is a 38-year-old female presents ED with complaints of nausea and vomiting burning sensation in chest as well as.  Vital signs stable arrival patient appears nontoxic on examination lungs CTA.  EKG sinus no gross ST change significant ischemia troponin negative.  CBC and electrolytes gross within normal limits except for mild hypokalemia given 20 mill equivalents of potassium via IV as patient cannot tolerate p.o. medications at this time.  Patient cannot tolerate solids or liquids at this time will admit with for IV antibiotics and reevaluation by GI.  Admission disposition: 2/24/2024 11:46 PM           Case discussed with Dr. Burnett at 11:46 PM agrees to admission.   consult order for Dr. Roa placed.                             Medical Decision Making      Disposition and Plan     Clinical Impression:  1. Chest pain, atypical    2. Intractable vomiting    3. Hypokalemia         Disposition:  Admit  2/24/2024 11:46 pm    Follow-up:  No follow-up provider specified.        Medications Prescribed:  Current Discharge Medication List                            Hospital Problems       Present on Admission  Date Reviewed: 1/26/2024            ICD-10-CM Noted POA    * (Principal) Chest pain, atypical R07.89 2/24/2024 Unknown    Achalasia K22.0 2/25/2024 Yes    Anxiety F41.9 12/9/2021 Yes    Esophageal obstruction due to food impaction T18.128A, W44.F3XA 2/19/2024 Yes    Esophageal reflux (Chronic) K21.9 2/25/2024 Yes    Hypokalemia E87.6 2/25/2024 Unknown    Intractable vomiting R11.10 2/25/2024 Yes    Primary hypertension I10 12/9/2021 Yes

## 2024-02-25 NOTE — H&P
[facility]     Mago Tovar Patient Status:  Emergency    3/31/1985 MRN [id number]   Location [facility] EMERGENCY DEPARTMENT Attending Isaura Dillard,    Hosp Day # 0 PCP None Pcp     Chief Complaint: dysphasia    Subjective:    History of Present Illness:     Mago Tovar is a 38 year old female with history of achalasia, hypertension, anxiety was just discharged from the hospital yesterday after having esophageal dilation and Botox injection for achalasia.  Patient went home was tolerating food just fine and then that that was able to tolerate her dinner with to bed and then woke up this morning tried some eggs could not keep them down even tried taking her medications with some water and was throwing up.  No hematemesis.  No fevers, chills, diarrhea.    History/Other:    Past Medical History:  Past Medical History:   Diagnosis Date    Anxiety     Essential hypertension      Past Surgical History:   Past Surgical History:   Procedure Laterality Date    APPENDECTOMY      REMOVAL GALLBLADDER      TOTAL ABDOM HYSTERECTOMY        Family History:   No family history on file.  Social History:    reports that she has never smoked. She does not have any smokeless tobacco history on file. She reports current alcohol use. She reports that she does not use drugs.     Allergies:   Allergies   Allergen Reactions    Ketorolac Tromethamine HIVES    Metoclopramide SHORTNESS OF BREATH    Naproxen HIVES    Prochlorperazine SHORTNESS OF BREATH       Medications:    Current Facility-Administered Medications on File Prior to Encounter   Medication Dose Route Frequency Provider Last Rate Last Admin    [COMPLETED] potassium chloride (K-Dur) tab 40 mEq  40 mEq Oral Once Alexx Victoria MD   40 mEq at 24 1215    [COMPLETED] potassium chloride 40 mEq in 250mL sodium chloride 0.9% IVPB premix  40 mEq Intravenous Once Alexx Victoria MD 62.5 mL/hr at 24  1059 40 mEq at 02/22/24 1059    [COMPLETED] onabotulinumtoxinA (Botox) injection 100 Units  100 Units Injection Once Perico Roa MD   100 Units at 02/22/24 1816    [COMPLETED] LORazepam (Ativan) 2 mg/mL injection 0.5 mg  0.5 mg Intravenous Once Alexx Victoria MD   0.5 mg at 02/21/24 1036    [COMPLETED] diazepam (Valium) 5 mg/mL injection 2.5 mg  2.5 mg Intravenous Once Ney Blackmon MD   2.5 mg at 02/20/24 0945    [COMPLETED] potassium chloride 40 mEq in 250mL sodium chloride 0.9% IVPB premix  40 mEq Intravenous Once Ney Blackmon MD 62.5 mL/hr at 02/20/24 1253 40 mEq at 02/20/24 1253    Followed by    [COMPLETED] potassium chloride 20 mEq/100mL IVPB premix 20 mEq  20 mEq Intravenous Once Ney Blackmon MD 50 mL/hr at 02/20/24 1802 20 mEq at 02/20/24 1802    [COMPLETED] sodium chloride 0.9 % IV bolus 1,000 mL  1,000 mL Intravenous Once Salina Melgar DO 1,000 mL/hr at 02/19/24 0628 1,000 mL at 02/19/24 0628    [COMPLETED] ALPRAZolam (Xanax) tab 1 mg  1 mg Oral Once Marck Boone MD   1 mg at 02/19/24 0633    [COMPLETED] ondansetron (Zofran) 4 MG/2ML injection 4 mg  4 mg Intravenous Once Marck Boone MD   4 mg at 02/19/24 0632    [COMPLETED] diazepam (Valium) 5 mg/mL injection 5 mg  5 mg Intravenous Once Marck Boone MD   5 mg at 02/19/24 0728    [COMPLETED] potassium chloride 20 mEq/100mL IVPB premix 20 mEq  20 mEq Intravenous Once Marck Boone MD 50 mL/hr at 02/19/24 0829 20 mEq at 02/19/24 0829    [COMPLETED] iopamidol 76% (ISOVUE-370) injection for power injector  100 mL Intravenous ONCE PRN Ney Blackmon MD   100 mL at 02/19/24 1530     Current Outpatient Medications on File Prior to Encounter   Medication Sig Dispense Refill    amLODIPine 10 MG Oral Tab Take 1 tablet (10 mg total) by mouth daily. 90 tablet 0    losartan 100 MG Oral Tab Take 1 tablet (100 mg total) by mouth daily. 90 tablet 0    clonazePAM 0.5 MG Oral Tab Take 1 tablet (0.5 mg total) by mouth 2 (two) times daily as needed for Anxiety.  30 tablet 0    pantoprazole 40 MG Oral Tab EC Take 1 tablet (40 mg total) by mouth every morning before breakfast. 30 tablet 3    QUEtiapine 25 MG Oral Tab Take 1 tablet (25 mg total) by mouth nightly. 90 tablet 0    sertraline 50 MG Oral Tab Take 1 tablet (50 mg total) by mouth daily. 30 tablet 1       Review of Systems:   A comprehensive review of systems was completed.    Pertinent positives and negatives noted in the HPI.    Objective:   Physical Exam:    BP (!) 142/115   Pulse 107   Temp 98.5 °F (36.9 °C) (Oral)   Resp 16   Ht 5' 2\" (1.575 m)   Wt 200 lb (90.7 kg)   SpO2 98%   BMI 36.58 kg/m²   General: No acute distress, Alert  Respiratory: No rhonchi, no wheezes  Cardiovascular: S1, S2. Regular rate and rhythm  Abdomen: Soft, Non-tender, non-distended, positive bowel sounds  Neuro: No new focal deficits  Extremities: No edema      Results:    Labs:      Labs Last 24 Hours:    Recent Labs   Lab 02/20/24  1034 02/22/24  0747 02/24/24  2208   RBC 4.74 5.14 5.20   HGB 12.1 13.3 13.6   HCT 38.7 40.7 42.6   MCV 81.6 79.2* 81.9   MCH 25.5* 25.9* 26.2   MCHC 31.3 32.7 31.9   RDW 14.3 14.2 14.6   NEPRELIM 3.48 3.38 3.47   WBC 6.5 6.6 7.7   .0 279.0 304.0       Recent Labs   Lab 02/19/24  0538 02/20/24  1034 02/20/24  2121 02/22/24  0747 02/23/24  0837 02/24/24  2208   * 95  --  104*  --  99   BUN 13 13  --  8*  --  11   CREATSERUM 0.98 0.67  --  0.81  --  0.91   EGFRCR 75 113  --  94  --  82   CA 10.2* 8.4*  --  8.7  --  9.8   ALB 4.1  --   --   --   --   --     140  --  137  --  140   K 3.3* 2.9*  2.9*   < > 3.4* 3.4* 3.3*    109  --  110  --  109   CO2 24.0 24.0  --  23.0  --  27.0   ALKPHO 106*  --   --   --   --   --    AST 17  --   --   --   --   --    ALT 19  --   --   --   --   --    BILT 0.3  --   --   --   --   --    TP 9.0*  --   --   --   --   --     < > = values in this interval not displayed.       No results found for: \"PT\", \"INR\"    Recent Labs   Lab 02/19/24  0538  02/19/24  1500   TROPHS 6 9       No results for input(s): \"TROP\", \"PBNP\" in the last 168 hours.    No results for input(s): \"PCT\" in the last 168 hours.    Imaging: Imaging data reviewed in Epic.    Assessment & Plan:      # Dysphasia   - Secondary to acalasia   - recent botox injection, dilitation   - GI to reevaluate in the am   -Will keep NPO.    # Hypertension   - Will order IV medication since she is unable to tolerate oral medications.    # Anxiety   - Will order prn IV medication since she is unable to tolerate oral medications at this time.          Plan of care discussed with patient at bedside.    Tremaine Joseph, DO    Supplementary Documentation:     The 21st Century Cures Act makes medical notes like these available to patients in the interest of transparency. Please be advised this is a medical document. Medical documents are intended to carry relevant information, facts as evident, and the clinical opinion of the practitioner. The medical note is intended as peer to peer communication and may appear blunt or direct. It is written in medical language and may contain abbreviations or verbiage that are unfamiliar.

## 2024-02-25 NOTE — PLAN OF CARE
Receive patient from ED for CP and difficulty swallowing. Patient is alert & oriented x4. Lives alone in hotel, request for . Pt scored high PHQ-4, psych liaison to see. Pt ambulates, reports generalized weakness, SBA for now. Breath sounds are clear bilateral. On room air. Normal sinus rhythm. Severe epigastric pain reported. Reports nausea overnight. Head-to-toe skin assessment done, no signs of wounds, skin dry and intact. Pt is continent of bowel & bladder.  Bed in low position and call light within reach. Reviewed plan of care and patient verbalizes understanding.        Problem: Patient/Family Goals  Goal: Patient/Family Long Term Goal  Description: Patient's Long Term Goal: Back home     Interventions:  - GI to see  - See additional Care Plan goals for specific interventions  Outcome: Progressing  Goal: Patient/Family Short Term Goal  Description: Patient's Short Term Goal: Feel better    Interventions:   - Medication management  - See additional Care Plan goals for specific interventions  Outcome: Progressing

## 2024-02-26 ENCOUNTER — PATIENT OUTREACH (OUTPATIENT)
Dept: CASE MANAGEMENT | Age: 39
End: 2024-02-26

## 2024-02-26 ENCOUNTER — APPOINTMENT (OUTPATIENT)
Dept: CT IMAGING | Facility: HOSPITAL | Age: 39
End: 2024-02-26
Attending: INTERNAL MEDICINE
Payer: MEDICAID

## 2024-02-26 ENCOUNTER — APPOINTMENT (OUTPATIENT)
Dept: CT IMAGING | Facility: HOSPITAL | Age: 39
DRG: 328 | End: 2024-02-26
Attending: INTERNAL MEDICINE
Payer: MEDICAID

## 2024-02-26 PROCEDURE — 71260 CT THORAX DX C+: CPT | Performed by: INTERNAL MEDICINE

## 2024-02-26 PROCEDURE — 99233 SBSQ HOSP IP/OBS HIGH 50: CPT | Performed by: HOSPITALIST

## 2024-02-26 RX ORDER — HYOSCYAMINE SULFATE 0.125 MG
0.12 TABLET,DISINTEGRATING ORAL EVERY 4 HOURS
Status: DISCONTINUED | OUTPATIENT
Start: 2024-02-26 | End: 2024-02-27

## 2024-02-26 NOTE — PLAN OF CARE
Pt alert and oriented x4. Pt on tele in NSR. Denies any c/o of chest pain or shortness of breath. Pt on room air. Pt continent of bowel and bladder. Pt endorsed pain, PRN pain medication given. Pt endorsed nausea, Zofran given. Pt called about nausea not relieved with Zofran PRN valium was given. Pt stated that \"helped.\"  Updated pt on plan of care, pt verbalizes understanding. Bed in lowest position and call light within reach.     0900: offered pt b/p medication, pt refused despite education given.     Plan: Esophogeal Manometry tomorrow  Awaiting House get back to us about transfer      Problem: Patient/Family Goals  Goal: Patient/Family Long Term Goal  Description: Patient's Long Term Goal: Back home     Interventions:  - GI to see  - See additional Care Plan goals for specific interventions  Outcome: Progressing  Goal: Patient/Family Short Term Goal  Description: Patient's Short Term Goal: Feel better    Interventions:   - Medication management  - See additional Care Plan goals for specific interventions  Outcome: Progressing     Problem: GASTROINTESTINAL - ADULT  Goal: Minimal or absence of nausea and vomiting  Description: INTERVENTIONS:  - Maintain adequate hydration with IV or PO as ordered and tolerated  - Nasogastric tube to low intermittent suction as ordered  - Evaluate effectiveness of ordered antiemetic medications  - Provide nonpharmacologic comfort measures as appropriate  - Advance diet as tolerated, if ordered  - Obtain nutritional consult as needed  - Evaluate fluid balance  Outcome: Progressing  Goal: Maintains or returns to baseline bowel function  Description: INTERVENTIONS:  - Assess bowel function  - Maintain adequate hydration with IV or PO as ordered and tolerated  - Evaluate effectiveness of GI medications  - Encourage mobilization and activity  - Obtain nutritional consult as needed  - Establish a toileting routine/schedule  - Consider collaborating with pharmacy to review patient's  medication profile  Outcome: Progressing  Goal: Maintains adequate nutritional intake (undernourished)  Description: INTERVENTIONS:  - Monitor percentage of each meal consumed  - Identify factors contributing to decreased intake, treat as appropriate  - Assist with meals as needed  - Monitor I&O, WT and lab values  - Obtain nutritional consult as needed  - Optimize oral hygiene and moisture  - Encourage food from home; allow for food preferences  - Enhance eating environment  Outcome: Progressing  Goal: Achieves appropriate nutritional intake (bariatric)  Description: INTERVENTIONS:  - Monitor for over-consumption  - Identify factors contributing to increased intake, treat as appropriate  - Monitor I&O, WT and lab values  - Obtain nutritional consult as needed  - Evaluate psychosocial factors contributing to over-consumption  Outcome: Progressing     Problem: PAIN - ADULT  Goal: Verbalizes/displays adequate comfort level or patient's stated pain goal  Description: INTERVENTIONS:  - Encourage pt to monitor pain and request assistance  - Assess pain using appropriate pain scale  - Administer analgesics based on type and severity of pain and evaluate response  - Implement non-pharmacological measures as appropriate and evaluate response  - Consider cultural and social influences on pain and pain management  - Manage/alleviate anxiety  - Utilize distraction and/or relaxation techniques  - Monitor for opioid side effects  - Notify MD/LIP if interventions unsuccessful or patient reports new pain  - Anticipate increased pain with activity and pre-medicate as appropriate  Outcome: Progressing

## 2024-02-26 NOTE — PROGRESS NOTES
TCM chart review.  Patent was discharged home on 2/23/25 and then readmitted on 2/24/24.  Encounter closing.

## 2024-02-26 NOTE — PROGRESS NOTES
Vitals:    02/25/24 1701   BP: (!) 186/114   Pulse: 96   Resp: 18   Temp: 98.6 °F (37 °C)       Dr barr notified of elevated BP's. Hydralazine prn ordered. Pt strongly refused to take medication

## 2024-02-26 NOTE — TRANSFER CENTER NOTE
Care Coordination [facility] Transfer Note:  Reason for transfer:     Higher level of care    Request initiated by:    Dr Victoria    Active Acute Medical issue:  Dysphagia sp EGD 2/19 with circumferential ulceration at GE junction, some retained fluid without retained solid material. Tight esophageal sphincter, achalasia? Manometry with achalasia type 2 on 2/21/2024, LISANDRA sp balloon dilatation and Botox injection 2/23/2024 sp EGD 2/25 with retained oral secretions, resistance to passage of scope across GE junction, no esophagitis, food impaction; normal stomach and duodenum  Diet as tolerated  IVF  PPI  Add scheduled Levsin  GI consult   Patient needs tertiary center, contacted transfer center to see if [facility] able to accept.  Essential hypertension  Vasotec IV, hold PTA Norvasc & Losartan  Monitor hemodynamics   Anxiety  Valium PRN       Anticipated Transfer Plan:   [facility] called, plan of care discussed with transfer center RN.    Face sheet faxed and copy of imaging has been requested for transport, bedside RN was updated. Patient/family was notified  by the provider and are agreeable to the plan.  [facility] is facilitating coordination of care and will follow.     [facility] #: [phone]

## 2024-02-26 NOTE — TRANSFER CENTER NOTE
Contacted [facility], they are waiting to hear from the physcian at [facility]. He is currently in a procedure.

## 2024-02-26 NOTE — PROGRESS NOTES
[facility]     Hospitalist Progress Note     Mago Tovar Patient Status:  Observation    3/31/1985 MRN [id number]   Location [facility] 2NE-A Attending Alexx Victoria MD   Hosp Day # 0 PCP None Pcp     Chief Complaint: Dysphagia    Subjective:   Patient states she cannot tolerate anything by mouth. Complains of pain.     Current medications:   hyoscyamine sulfate  0.125 mg Sublingual q4h    enalaprilat  0.625 mg Intravenous Q6H    pantoprazole  40 mg Intravenous Q12H       Objective:    Review of Systems:   10 point ROS completed and was negative, except for pertinent positive and negatives stated in subjective.    Vital signs:  Temp:  [98.2 °F (36.8 °C)-98.8 °F (37.1 °C)] 98.2 °F (36.8 °C)  Pulse:  [0-99] 0  Resp:  [16-18] 16  BP: (107-186)/() 120/76  SpO2:  [96 %-100 %] 98 %  Patient Weight for the past 72 hrs:   Weight   24 2043 200 lb (90.7 kg)   24 0110 198 lb 3.1 oz (89.9 kg)     Physical Exam:    General: No acute distress.   Respiratory: Clear to auscultation bilaterally  Cardiovascular: S1, S2. Regular rate and rhythm.   Abdomen: Soft, nontender, nondistended.  Positive bowel sounds.   Extremities: No edema.  Neuro: AAOx3    Diagnostic Data:    Labs:  Recent Labs   Lab 24  1034 24  0747 248   WBC 6.5 6.6 7.7   HGB 12.1 13.3 13.6   MCV 81.6 79.2* 81.9   .0 279.0 304.0       Recent Labs   Lab 24  1034 24  2121 24  0747 24  0837 248 24  1133   GLU 95  --  104*  --  99  --    BUN 13  --  8*  --  11  --    CREATSERUM 0.67  --  0.81  --  0.91  --    CA 8.4*  --  8.7  --  9.8  --      --  137  --  140  --    K 2.9*  2.9*   < > 3.4* 3.4* 3.3* 3.7     --  110  --  109  --    CO2 24.0  --  23.0  --  27.0  --     < > = values in this interval not displayed.       Estimated Creatinine Clearance: 65 mL/min (based on SCr of 0.91 mg/dL).    No results for input(s): \"PTP\", \"INR\" in the last 168  hours.         COVID-19 Lab Results    COVID-19  Lab Results   Component Value Date    COVID19 Not Detected 02/19/2024    COVID19 NOT DETECTED 12/09/2021       Pro-Calcitonin  No results for input(s): \"PCT\" in the last 168 hours.    Cardiac  No results for input(s): \"TROP\", \"PBNP\" in the last 168 hours.    Creatinine Kinase  No results for input(s): \"CK\" in the last 168 hours.    Inflammatory Markers  No results for input(s): \"CRP\", \"ALVIN\", \"LDH\", \"DDIMER\" in the last 168 hours.      Recent Labs   Lab 02/19/24  1500 02/24/24  2208   TROPHS 9 8       Imaging: Imaging data reviewed in Epic.    Medications:    hyoscyamine sulfate  0.125 mg Sublingual q4h    enalaprilat  0.625 mg Intravenous Q6H    pantoprazole  40 mg Intravenous Q12H       Assessment & Plan:      Dysphagia sp EGD 2/19 with circumferential ulceration at GE junction, some retained fluid without retained solid material. Tight esophageal sphincter, achalasia? Manometry with achalasia type 2 on 2/21/2024, LISANDRA sp balloon dilatation and Botox injection 2/23/2024 sp EGD 2/25 with retained oral secretions, resistance to passage of scope across GE junction, no esophagitis, food impaction; normal stomach and duodenum  Diet as tolerated  IVF  PPI  Add scheduled Levsin  GI consult   Patient needs tertiary center, contacted transfer center to see if [facility] able to accept.  Essential hypertension  Vasotec IV, hold PTA Norvasc & Losartan  Monitor hemodynamics   Anxiety  Valium PRN    Supplementary Documentation:   Quality:  DVT Prophylaxis: SCDs    At this point Ms. Tovar is expected to be discharge to: Home    Plan of care discussed with patient, RN and transfer center.    Alexx Victoria MD                 denies pain/discomfort

## 2024-02-26 NOTE — PAYOR COMM NOTE
--------------  DISCHARGE REVIEW    Payor: [facility]  Subscriber #:  [id number]  Authorization Number: [id number]    Admit date: 24  Admit time:  11:24 AM  Discharge Date: 2024  2:53 PM     Admitting Physician: Ney Blakcmon MD  Attending Physician:  No att. providers found  Primary Care Physician: Pcp, None          Discharge Summary Notes        Discharge Summary signed by Alexx Victoria MD at 2024  1:00 PM       Author: Alexx Victoria MD Specialty: HOSPITALIST, Internal Medicine Author Type: Physician    Filed: 2024  1:00 PM Date of Service: 2024 12:11 PM Status: Signed    : Alexx Victoria MD (Physician)           [facility]  DISCHARGE SUMMARY     Mago Tovar Patient Status:  Inpatient    3/31/1985 MRN [id number]   Location [facility] 3NE-A Attending Alexx Victoria MD   Hosp Day # 4 PCP None Pcp     Date of Admission: 2024  Date of Discharge:   2024    Discharge Disposition: Home or Self Care    Discharge Diagnosis:  Chest pain, non-cardiac, trop neg  Essential hypertension  Dysphagia sp EGD  with circumferential ulceration at GE junction, some retained fluid without retained solid material. Tight esophageal sphincter, achalasia? Manometry with achalasia type 2 on 2024, LISANDRA sp balloon dilatation and Botox injection 2024  Anxiety  Hypercalcemia, resolved    History of Present Illness: Mago Tovar is a 38 year old female with past medical history of hypertension and anxiety who presented to the emergency department with chest pains.  Patient with multiple complaints, states she has been having chest pain, abdominal pain, and feeling like something was stuck in her throat when swallowing.  Patient does have severe anxiety, she says she stopped taking her sertraline about 2 months ago because she felt like her symptoms were better controlled.  About 1 month ago is when she began  having worsening anxiety.  She has over the last 2 to 3 days has been having persistent panic attacks has been occurring every couple of hours.  Today she began having chest discomfort, and symptoms already noted above.  Patient was seen after EGD, she still anxious about what is going on.  She denies any fevers, no cough, no shortness of breath, no other acute complaints.     Brief Synopsis: Patient presented with atypical chest pain. She was admitted with GI on consult. Patient with dysphagia sp EGD 2/19 with circumferential ulceration at GE junction, some retained fluid without retained solid material. Tight esophageal sphincter, achalasia? Patient was unable to tolerate pills, liquids and any solid. She underwent manometry with achalasia type 2 on 2/21/2024. Advanced GI consulted, LISANDRA sp balloon dilatation and Botox injection 2/23/2024. Patient with marked improvement. Tolerating diet. Home on regimen outlined below. Outpatient follow-up recommended.    Lace+ Score: 20  59-90 High Risk  29-58 Medium Risk  0-28   Low Risk  Patient was referred to the [facility].    TCM Follow-Up Recommendation:  LACE 29-58: Moderate Risk of readmission after discharge from the hospital.    Discharge Medication List:     Discharge Medications        START taking these medications        Instructions Prescription details   clonazePAM 0.5 MG Tabs  Commonly known as: KlonoPIN      Take 1 tablet (0.5 mg total) by mouth 2 (two) times daily as needed for Anxiety.   Quantity: 30 tablet  Refills: 0     pantoprazole 40 MG Tbec  Commonly known as: Protonix      Take 1 tablet (40 mg total) by mouth every morning before breakfast.   Quantity: 30 tablet  Refills: 3            CHANGE how you take these medications        Instructions Prescription details   losartan 100 MG Tabs  Commonly known as: Cozaar  What changed: medication strength      Take 1 tablet (100 mg total) by mouth daily.   Quantity: 90 tablet  Refills: 0             CONTINUE taking these medications        Instructions Prescription details   amLODIPine 10 MG Tabs  Commonly known as: Norvasc      Take 1 tablet (10 mg total) by mouth daily.   Stop taking on: May 23, 2024  Quantity: 90 tablet  Refills: 0     QUEtiapine 25 MG Tabs  Commonly known as: SEROquel      Take 1 tablet (25 mg total) by mouth nightly.   Quantity: 90 tablet  Refills: 0     sertraline 50 MG Tabs  Commonly known as: Zoloft      Take 1 tablet (50 mg total) by mouth daily.   Quantity: 30 tablet  Refills: 1            STOP taking these medications      traZODone 50 MG Tabs  Commonly known as: Desyrel                  Where to Get Your Medications        These medications were sent to [facility] [address] [place] [address] [phone], [phone]  [address] [place] [address]      Phone: [phone]   amLODIPine 10 MG Tabs  clonazePAM 0.5 MG Tabs  losartan 100 MG Tabs  pantoprazole 40 MG Tbec  QUEtiapine 25 MG Tabs         [place]    Follow-up appointment:   Pcp, None  [facility] [address]    Schedule an appointment as soon as possible for a visit in 2 week(s)  To establish with a primary physician with [facility], call our Physician Referral line at [phone], Monday through Friday from 7 a.m. to 6 p.m. and Saturdays from 7 a.m. to 1 p.m    Bakari Romero  [address] [place] [id number] [phone]    Schedule an appointment as soon as possible for a visit  Or Dr Calderon    Appointments for Next 30 Days 2/24/2024 - 3/25/2024        Date Arrival Time Visit Type Length Department Provider     2/26/2024  9:00 AM  NON[facility] HOSPITAL FOLLOW UP [5060] 60 min [facility] Michelle Finn APRN    Patient Instructions:         Location Instructions:     Masks are optional for all patients and visitors, unless otherwise indicated.                       -----------------------------------------------------------------------------------------------  PATIENT DISCHARGE INSTRUCTIONS: See electronic chart    Alexx Victorai MD    Total time spent on discharge plannin minutes     The  [employer] Cures Act makes medical notes like these available to patients in the interest of transparency. Please be advised this is a medical document. Medical documents are intended to carry relevant information, facts as evident, and the clinical opinion of the practitioner. The medical note is intended as peer to peer communication and may appear blunt or direct. It is written in medical language and may contain abbreviations or verbiage that are unfamiliar.     Electronically signed by Alexx Victoria MD on 2024  1:00 PM

## 2024-02-26 NOTE — PROGRESS NOTES
[facility]  Progress Note    Mago Tovar Patient Status:  Observation    3/31/1985 MRN [id number]   Location [facility] 2NE-A Attending Alexx Victoria MD   Date 2024 PCP None Pcp     Subjective:  Mago Tovar is a(n) 38 year old female with dysphagia, persistent symptoms despite Botox and 20 mm balloon dilatation    Objective:  Blood pressure (!) 159/116, pulse 85, temperature 98.7 °F (37.1 °C), temperature source Oral, resp. rate 18, height 5' 2\" (1.575 m), weight 198 lb 3.1 oz (89.9 kg), SpO2 98%, not currently breastfeeding.    Constitutional: Appearance: well nourished, obese.  Psychiatric: Normal affect, orientation, mood  Eyes: Normal sclera and conjunctiva  Cardiovascular: Normal heart rate.  Ear, nose, mouth and throat: Normal lips  Respiratory: Normal effort.    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

## 2024-02-26 NOTE — SPIRITUAL CARE NOTE
Spiritual Care Visit Note    Patient Name: Mago Tovar Date of Spiritual Care Visit: 24   : 3/31/1985 Primary Dx: Chest pain, atypical       Referred By: RN     Spiritual Care Taxonomy:    Intended Effects: Lessen anxiety    Methods: Offer spiritual/Rastafari support;Offer emotional support    Interventions: Acknowledge current situation;Active listening;Silent prayer;Explain  role;Share words of hope and inspiration    Visit Type/Summary:     - Spiritual Care: Responded to a request for spiritual care and assessed the patient for spiritual care needs. Consulted with RN prior to visit. Offered empathic listening and emotional support. Provided information regarding how to contact Spiritual Care and left a Spiritual Care information card.  remains available as needed for follow up.    Spiritual Care support can be requested via an Epic consult.   For urgent/immediate needs, please contact the On Call  at: [facility]: ext [phone]

## 2024-02-26 NOTE — PLAN OF CARE
Called into patient's room . She voiced her disappointment with her care during this visit to the hospital. She stated that she felt more tests were run last visit. I explained to her that the same tests were not going to be ordered because she already received her results from those tests last week.  Requesting valium.   Morphine given for throat pain 8/10. Blood pressure decreasing as patient now remains calm.   Patient requesting social work to visit with her tomorrow to discuss possible transportation to a tertiary facility.  Plan of care updated with patient.      Problem: GASTROINTESTINAL - ADULT  Goal: Minimal or absence of nausea and vomiting  Description: INTERVENTIONS:  - Maintain adequate hydration with IV or PO as ordered and tolerated  - Nasogastric tube to low intermittent suction as ordered  - Evaluate effectiveness of ordered antiemetic medications  - Provide nonpharmacologic comfort measures as appropriate  - Advance diet as tolerated, if ordered  - Obtain nutritional consult as needed  - Evaluate fluid balance  Outcome: Progressing  Goal: Maintains or returns to baseline bowel function  Description: INTERVENTIONS:  - Assess bowel function  - Maintain adequate hydration with IV or PO as ordered and tolerated  - Evaluate effectiveness of GI medications  - Encourage mobilization and activity  - Obtain nutritional consult as needed  - Establish a toileting routine/schedule  - Consider collaborating with pharmacy to review patient's medication profile  Outcome: Progressing  Goal: Maintains adequate nutritional intake (undernourished)  Description: INTERVENTIONS:  - Monitor percentage of each meal consumed  - Identify factors contributing to decreased intake, treat as appropriate  - Assist with meals as needed  - Monitor I&O, WT and lab values  - Obtain nutritional consult as needed  - Optimize oral hygiene and moisture  - Encourage food from home; allow for food preferences  - Enhance eating  environment  Outcome: Not Progressing     Problem: PAIN - ADULT  Goal: Verbalizes/displays adequate comfort level or patient's stated pain goal  Description: INTERVENTIONS:  - Encourage pt to monitor pain and request assistance  - Assess pain using appropriate pain scale  - Administer analgesics based on type and severity of pain and evaluate response  - Implement non-pharmacological measures as appropriate and evaluate response  - Consider cultural and social influences on pain and pain management  - Manage/alleviate anxiety  - Utilize distraction and/or relaxation techniques  - Monitor for opioid side effects  - Notify MD/LIP if interventions unsuccessful or patient reports new pain  - Anticipate increased pain with activity and pre-medicate as appropriate  Outcome: Progressing

## 2024-02-26 NOTE — TRANSFER CENTER NOTE
Spoke to [facility]. They have reached out to 2 GI physicians and they have not called back. They are still trying to reach GI.

## 2024-02-27 PROCEDURE — 99232 SBSQ HOSP IP/OBS MODERATE 35: CPT | Performed by: HOSPITALIST

## 2024-02-27 PROCEDURE — 4A0B7BZ MEASUREMENT OF GASTROINTESTINAL PRESSURE, VIA NATURAL OR ARTIFICIAL OPENING: ICD-10-PCS | Performed by: INTERNAL MEDICINE

## 2024-02-27 RX ORDER — ENALAPRILAT 1.25 MG/ML
0.62 INJECTION INTRAVENOUS EVERY 8 HOURS
Status: DISCONTINUED | OUTPATIENT
Start: 2024-02-27 | End: 2024-03-01

## 2024-02-27 RX ORDER — LIDOCAINE HYDROCHLORIDE 20 MG/ML
JELLY TOPICAL
Status: DISCONTINUED | OUTPATIENT
Start: 2024-02-27 | End: 2024-02-29 | Stop reason: HOSPADM

## 2024-02-27 RX ORDER — SODIUM CHLORIDE 9 MG/ML
INJECTION, SOLUTION INTRAVENOUS CONTINUOUS
Status: DISCONTINUED | OUTPATIENT
Start: 2024-02-27 | End: 2024-02-29

## 2024-02-27 NOTE — CM/SW NOTE
Briefly met with patient to discuss housing SDOH issues as well as transportation.  Encuraged patient to reach out to her [employer] to arrange transportation.  Patient confirmed she will be returning to [facility] in [place].

## 2024-02-27 NOTE — PLAN OF CARE
RN SHIFT NOTE    Assumed care of pt at 0700. C/o chronic abd pain at this time. A&O x 4. NSR on tele. No edema noted. Lung sounds clear. Abdomen soft and round. Skin C/D/I. Tolerating medications and care needs have been met at this time.     POC: procedure tomorrow, NPO at midnight, clear liquid diet, pain management.       Problem: Patient/Family Goals  Goal: Patient/Family Long Term Goal  Description: Patient's Long Term Goal: Back home     Interventions:  - GI to see  - See additional Care Plan goals for specific interventions  Outcome: Progressing  Goal: Patient/Family Short Term Goal  Description: Patient's Short Term Goal: Feel better    Interventions:   - Medication management  - See additional Care Plan goals for specific interventions  Outcome: Progressing     Problem: GASTROINTESTINAL - ADULT  Goal: Minimal or absence of nausea and vomiting  Description: INTERVENTIONS:  - Maintain adequate hydration with IV or PO as ordered and tolerated  - Nasogastric tube to low intermittent suction as ordered  - Evaluate effectiveness of ordered antiemetic medications  - Provide nonpharmacologic comfort measures as appropriate  - Advance diet as tolerated, if ordered  - Obtain nutritional consult as needed  - Evaluate fluid balance  Outcome: Progressing  Goal: Maintains or returns to baseline bowel function  Description: INTERVENTIONS:  - Assess bowel function  - Maintain adequate hydration with IV or PO as ordered and tolerated  - Evaluate effectiveness of GI medications  - Encourage mobilization and activity  - Obtain nutritional consult as needed  - Establish a toileting routine/schedule  - Consider collaborating with pharmacy to review patient's medication profile  Outcome: Progressing  Goal: Maintains adequate nutritional intake (undernourished)  Description: INTERVENTIONS:  - Monitor percentage of each meal consumed  - Identify factors contributing to decreased intake, treat as appropriate  - Assist with meals as  needed  - Monitor I&O, WT and lab values  - Obtain nutritional consult as needed  - Optimize oral hygiene and moisture  - Encourage food from home; allow for food preferences  - Enhance eating environment  Outcome: Progressing  Goal: Achieves appropriate nutritional intake (bariatric)  Description: INTERVENTIONS:  - Monitor for over-consumption  - Identify factors contributing to increased intake, treat as appropriate  - Monitor I&O, WT and lab values  - Obtain nutritional consult as needed  - Evaluate psychosocial factors contributing to over-consumption  Outcome: Progressing     Problem: PAIN - ADULT  Goal: Verbalizes/displays adequate comfort level or patient's stated pain goal  Description: INTERVENTIONS:  - Encourage pt to monitor pain and request assistance  - Assess pain using appropriate pain scale  - Administer analgesics based on type and severity of pain and evaluate response  - Implement non-pharmacological measures as appropriate and evaluate response  - Consider cultural and social influences on pain and pain management  - Manage/alleviate anxiety  - Utilize distraction and/or relaxation techniques  - Monitor for opioid side effects  - Notify MD/LIP if interventions unsuccessful or patient reports new pain  - Anticipate increased pain with activity and pre-medicate as appropriate  Outcome: Progressing

## 2024-02-27 NOTE — PLAN OF CARE
Patient alert and oriented x4. Breathing regular and easy. On RA. SR in tele. Continent. Up ad cheyanne/SBA. Morphine IV for abdominal pain. W/ no relief from 2mg. Morphine 4mg given IV after w/c  pt. claimed, \"I feel weird, feeling panicky\" and was taking deep breaths. O2 at 2L/nc initiated. MD notified. Valium for nausea given as Zofran is not due yet. Pt. Was able to sleep after. NPO maintained. Bed alarm on. Call light w/in reach.      Problem: Patient/Family Goals  Goal: Patient/Family Long Term Goal  Description: Patient's Long Term Goal: Back home     Interventions:  - GI to see  - See additional Care Plan goals for specific interventions  Outcome: Progressing  Goal: Patient/Family Short Term Goal  Description: Patient's Short Term Goal: Feel better    Interventions:   - Medication management  - See additional Care Plan goals for specific interventions  Outcome: Progressing     Problem: GASTROINTESTINAL - ADULT  Goal: Minimal or absence of nausea and vomiting  Description: INTERVENTIONS:  - Maintain adequate hydration with IV or PO as ordered and tolerated  - Nasogastric tube to low intermittent suction as ordered  - Evaluate effectiveness of ordered antiemetic medications  - Provide nonpharmacologic comfort measures as appropriate  - Advance diet as tolerated, if ordered  - Obtain nutritional consult as needed  - Evaluate fluid balance  Outcome: Progressing  Goal: Maintains or returns to baseline bowel function  Description: INTERVENTIONS:  - Assess bowel function  - Maintain adequate hydration with IV or PO as ordered and tolerated  - Evaluate effectiveness of GI medications  - Encourage mobilization and activity  - Obtain nutritional consult as needed  - Establish a toileting routine/schedule  - Consider collaborating with pharmacy to review patient's medication profile  Outcome: Progressing  Goal: Maintains adequate nutritional intake (undernourished)  Description: INTERVENTIONS:  - Monitor percentage of each  meal consumed  - Identify factors contributing to decreased intake, treat as appropriate  - Assist with meals as needed  - Monitor I&O, WT and lab values  - Obtain nutritional consult as needed  - Optimize oral hygiene and moisture  - Encourage food from home; allow for food preferences  - Enhance eating environment  Outcome: Progressing  Goal: Achieves appropriate nutritional intake (bariatric)  Description: INTERVENTIONS:  - Monitor for over-consumption  - Identify factors contributing to increased intake, treat as appropriate  - Monitor I&O, WT and lab values  - Obtain nutritional consult as needed  - Evaluate psychosocial factors contributing to over-consumption  Outcome: Progressing     Problem: PAIN - ADULT  Goal: Verbalizes/displays adequate comfort level or patient's stated pain goal  Description: INTERVENTIONS:  - Encourage pt to monitor pain and request assistance  - Assess pain using appropriate pain scale  - Administer analgesics based on type and severity of pain and evaluate response  - Implement non-pharmacological measures as appropriate and evaluate response  - Consider cultural and social influences on pain and pain management  - Manage/alleviate anxiety  - Utilize distraction and/or relaxation techniques  - Monitor for opioid side effects  - Notify MD/LIP if interventions unsuccessful or patient reports new pain  - Anticipate increased pain with activity and pre-medicate as appropriate  Outcome: Progressing

## 2024-02-27 NOTE — TRANSFER CENTER NOTE
Spoke to Ad[facility] ESCUDERO and per Dr. Roa, pt is no longer in need of being transferred to a tertiary care center.

## 2024-02-27 NOTE — PROGRESS NOTES
[facility]     Hospitalist Progress Note     Mago Tovar Patient Status:  Observation    3/31/1985 MRN [id number]   Location [facility] 2NE-A Attending Alexx Victoria MD   Hosp Day # 0 PCP None Pcp     Chief Complaint: Dysphagia    Subjective:   Patient feels a bit better today. Was not able to tolerate anything by mouth yesterday. Required oxygen after Morphine last night.     Current medications:   enalaprilat  0.625 mg Intravenous Q8H    pantoprazole  40 mg Intravenous Q12H       Objective:    Review of Systems:   10 point ROS completed and was negative, except for pertinent positive and negatives stated in subjective.    Vital signs:  Temp:  [98.1 °F (36.7 °C)-98.8 °F (37.1 °C)] 98.5 °F (36.9 °C)  Pulse:  [71-94] 72  Resp:  [10-20] 14  BP: (102-151)/() 129/83  SpO2:  [94 %-100 %] 97 %  Patient Weight for the past 72 hrs:   Weight   24 2043 200 lb (90.7 kg)   24 0110 198 lb 3.1 oz (89.9 kg)     Physical Exam:    General: No acute distress.   Respiratory: Clear to auscultation bilaterally  Cardiovascular: S1, S2. Regular rate and rhythm.   Abdomen: Soft, nontender, nondistended.  Positive bowel sounds.   Extremities: No edema.  Neuro: AAOx3    Diagnostic Data:    Labs:  Recent Labs   Lab 24  0747 24  2208   WBC 6.6 7.7   HGB 13.3 13.6   MCV 79.2* 81.9   .0 304.0       Recent Labs   Lab 24  0747 24  0837 24  2208 24  1133   *  --  99  --    BUN 8*  --  11  --    CREATSERUM 0.81  --  0.91  --    CA 8.7  --  9.8  --      --  140  --    K 3.4* 3.4* 3.3* 3.7     --  109  --    CO2 23.0  --  27.0  --        Estimated Creatinine Clearance: 65 mL/min (based on SCr of 0.91 mg/dL).    No results for input(s): \"PTP\", \"INR\" in the last 168 hours.         COVID-19 Lab Results    COVID-19  Lab Results   Component Value Date    COVID19 Not Detected 2024    COVID19 NOT DETECTED 2021       Pro-Calcitonin  No  results for input(s): \"PCT\" in the last 168 hours.    Cardiac  No results for input(s): \"TROP\", \"PBNP\" in the last 168 hours.    Creatinine Kinase  No results for input(s): \"CK\" in the last 168 hours.    Inflammatory Markers  No results for input(s): \"CRP\", \"ALVIN\", \"LDH\", \"DDIMER\" in the last 168 hours.      Recent Labs   Lab 02/24/24  2208   TROPHS 8       Imaging: Imaging data reviewed in Epic.    Medications:    enalaprilat  0.625 mg Intravenous Q8H    pantoprazole  40 mg Intravenous Q12H       Assessment & Plan:      Dysphagia sp EGD 2/19 with circumferential ulceration at GE junction, some retained fluid without retained solid material. Tight esophageal sphincter, achalasia? Manometry with achalasia type 2 on 2/21/2024, LISANDRA sp balloon dilatation and Botox injection 2/23/2024 sp EGD 2/25 with retained oral secretions, resistance to passage of scope across GE junction, no esophagitis, food impaction; normal stomach and duodenum  NPO  IVF  PPI  Levsin PRN  Manometry study today   GI consult   Essential hypertension  Vasotec IV - change to q 8, hold PTA Norvasc & Losartan  Monitor hemodynamics   Anxiety  Valium PRN    Supplementary Documentation:   Quality:  DVT Prophylaxis: SCDs    At this point Ms. Tovar is expected to be discharge to: Home    Plan of care discussed with patient and RN.    Alexx Victoria MD

## 2024-02-27 NOTE — CM/SW NOTE
Spoke with P[facility], still waiting to hear back from [facility]   no suicidal ideation/no depression/no anxiety

## 2024-02-27 NOTE — OPERATIVE REPORT
Mago Tovar Patient Status:  Observation    3/31/1985 MRN [id number]   [facility] 2NE-A Attending Alexx Victoria MD   Date 2024 PCP None Pcp     PREOPERATIVE DIAGNOSIS/INDICATION: Dysphagia, prior inconclusive HRM suggestive of EGJOO, no response to 20 mm balloon dilatation and Botox, chest pain  POSTOPERTATIVE DIAGNOSIS: Achalasia type II  PROCEDURE PERFORMED: High resolution manometry  FINDINGS:  LES base pressure elevated at 50.3 mm hg, with elevated residual pressure 38.3 mm Hg, 80% failed swallows 90% ineffective, 80% pan esophageal pressurization  RECOMMENDATIONS:   EGD peroral endoscopic myotomy TA Roa MD  2024  1:36 PM

## 2024-02-28 LAB
ANION GAP SERPL CALC-SCNC: 3 MMOL/L (ref 0–18)
BASOPHILS # BLD AUTO: 0.07 X10(3) UL (ref 0–0.2)
BASOPHILS NFR BLD AUTO: 1.1 %
BUN BLD-MCNC: 11 MG/DL (ref 9–23)
CALCIUM BLD-MCNC: 8.7 MG/DL (ref 8.5–10.1)
CHLORIDE SERPL-SCNC: 107 MMOL/L (ref 98–112)
CO2 SERPL-SCNC: 27 MMOL/L (ref 21–32)
CREAT BLD-MCNC: 1 MG/DL
EGFRCR SERPLBLD CKD-EPI 2021: 73 ML/MIN/1.73M2 (ref 60–?)
EOSINOPHIL # BLD AUTO: 0.14 X10(3) UL (ref 0–0.7)
EOSINOPHIL NFR BLD AUTO: 2.1 %
ERYTHROCYTE [DISTWIDTH] IN BLOOD BY AUTOMATED COUNT: 14.7 %
GLUCOSE BLD-MCNC: 106 MG/DL (ref 70–99)
HCT VFR BLD AUTO: 40 %
HGB BLD-MCNC: 12.7 G/DL
IMM GRANULOCYTES # BLD AUTO: 0.05 X10(3) UL (ref 0–1)
IMM GRANULOCYTES NFR BLD: 0.8 %
INR BLD: 0.99 (ref 0.8–1.2)
LYMPHOCYTES # BLD AUTO: 2.31 X10(3) UL (ref 1–4)
LYMPHOCYTES NFR BLD AUTO: 35.4 %
MAGNESIUM SERPL-MCNC: 2.4 MG/DL (ref 1.6–2.6)
MCH RBC QN AUTO: 26.1 PG (ref 26–34)
MCHC RBC AUTO-ENTMCNC: 31.8 G/DL (ref 31–37)
MCV RBC AUTO: 82.3 FL
MONOCYTES # BLD AUTO: 0.33 X10(3) UL (ref 0.1–1)
MONOCYTES NFR BLD AUTO: 5.1 %
NEUTROPHILS # BLD AUTO: 3.62 X10 (3) UL (ref 1.5–7.7)
NEUTROPHILS # BLD AUTO: 3.62 X10(3) UL (ref 1.5–7.7)
NEUTROPHILS NFR BLD AUTO: 55.5 %
OSMOLALITY SERPL CALC.SUM OF ELEC: 284 MOSM/KG (ref 275–295)
PLATELET # BLD AUTO: 285 10(3)UL (ref 150–450)
POTASSIUM SERPL-SCNC: 3.7 MMOL/L (ref 3.5–5.1)
PROTHROMBIN TIME: 13.1 SECONDS (ref 11.6–14.8)
RBC # BLD AUTO: 4.86 X10(6)UL
SODIUM SERPL-SCNC: 137 MMOL/L (ref 136–145)
WBC # BLD AUTO: 6.5 X10(3) UL (ref 4–11)

## 2024-02-28 PROCEDURE — 99232 SBSQ HOSP IP/OBS MODERATE 35: CPT | Performed by: HOSPITALIST

## 2024-02-28 RX ORDER — HYDROMORPHONE HYDROCHLORIDE 1 MG/ML
0.2 INJECTION, SOLUTION INTRAMUSCULAR; INTRAVENOUS; SUBCUTANEOUS EVERY 2 HOUR PRN
Status: DISCONTINUED | OUTPATIENT
Start: 2024-02-28 | End: 2024-03-01

## 2024-02-28 RX ORDER — DIPHENHYDRAMINE HYDROCHLORIDE 50 MG/ML
12.5 INJECTION INTRAMUSCULAR; INTRAVENOUS EVERY 6 HOURS PRN
Status: DISCONTINUED | OUTPATIENT
Start: 2024-02-28 | End: 2024-03-02

## 2024-02-28 RX ORDER — HYDROMORPHONE HYDROCHLORIDE 1 MG/ML
0.8 INJECTION, SOLUTION INTRAMUSCULAR; INTRAVENOUS; SUBCUTANEOUS EVERY 2 HOUR PRN
Status: DISCONTINUED | OUTPATIENT
Start: 2024-02-28 | End: 2024-03-01

## 2024-02-28 RX ORDER — HYDROMORPHONE HYDROCHLORIDE 1 MG/ML
0.4 INJECTION, SOLUTION INTRAMUSCULAR; INTRAVENOUS; SUBCUTANEOUS EVERY 2 HOUR PRN
Status: DISCONTINUED | OUTPATIENT
Start: 2024-02-28 | End: 2024-03-01

## 2024-02-28 NOTE — PROGRESS NOTES
Gastroenterology Progress Note  Mago Tovar Patient Status:  Inpatient    3/31/1985 MRN [id number]   Location [facility] 2NE-A Attending Ney Blackmon MD   Hosp Day # 0 PCP None Pcp     Chief Complaint: Dysphagia; Achalasia   S: Pt frustrated with ongoing odynophagia and dysphagia. Tolerating sips of clear liquids. No melena or hematochezia   O: /87 (BP Location: Left arm)   Pulse 87   Temp 98.2 °F (36.8 °C) (Oral)   Resp 17   Ht 5' 2\" (1.575 m)   Wt 198 lb 3.1 oz (89.9 kg)   SpO2 96%   BMI 36.25 kg/m²   Gen: AAOx3  CV: RRR with normal S1 / S2  Resp: CTA bilaterally; No increase in respiratory effort   Abd: (+)BS, soft, non-tender, non-distended; no rebound or guarding  Ext: No edema or cyanosis  Skin: Warm and dry  Laboratory Data:     No results for input(s): \"PGLU\" in the last 168 hours.  Recent Labs   Lab 24  0621   INR 0.99         Recent Labs   Lab 24  0747 24  2208 24  0621   WBC 6.6 7.7 6.5   HGB 13.3 13.6 12.7   .0 304.0 285.0       Recent Labs   Lab 24  0747 24  0837 24  2208 24  1133 24  0621     --  140  --  137   K 3.4* 3.4* 3.3* 3.7 3.7     --  109  --  107   CO2 23.0  --  27.0  --  27.0   BUN 8*  --  11  --  11   CREATSERUM 0.81  --  0.91  --  1.00       No results for input(s): \"ALT\", \"AST\" in the last 168 hours.    Invalid input(s): \"ALPHOS\", \"TBIL\"  Assessment: 38 yr-old female with dysphagia s/p EGD with dilation + Botox without improvement (24; Dr Roa) with manometry  revealing Achalasia type II. Plan for EGD with POEM under general in AM  The risks, benefits, alternatives of the procedure including the risks of anesthesia, bleeding, perforation, missed lesions, need for surgery, and infection were discussed with the patient. She expressed understanding of the risks and was agreeable to proceed.  Plan:   Plan for EGD with G-POEM  under general in Am with Dr Roa  Clear liquid diet today as able; NPO after midnight  Pain control per Hospitalist recommendations; antiemetics as needed  BMP, Mg in AM correcting electrolytes as needed   Case discussed with Dr Kristy Mccray, ZENY  10:16 AM  2/28/2024  [facility]  [phone]

## 2024-02-28 NOTE — PROGRESS NOTES
[facility]     Hospitalist Progress Note     Mago Tovar Patient Status:  Inpatient    3/31/1985 MRN [id number]   Location [facility] 2NE-A Attending Ney Blackmon MD   Hosp Day # 0 PCP None Pcp     Chief Complaint: Dysphagia     Subjective:     Patient is still having mid abdominal pain especially worsened with swallowing.  Her pain is currently uncontrolled on her current pain plan.  She denies any fevers or chills, no shortness of breath.  She has no other acute complaints at this time.    Objective:    Review of Systems:   A comprehensive review of systems was completed; pertinent positive and negatives stated in subjective.    Vital signs:  Temp:  [97.9 °F (36.6 °C)-98.7 °F (37.1 °C)] 97.9 °F (36.6 °C)  Pulse:  [62-89] 76  Resp:  [10-20] 18  BP: (118-145)/() 118/77  SpO2:  [92 %-100 %] 92 %    Physical Exam:    General: No acute distress, pleasant   Respiratory: No wheezes, no rhonchi  Cardiovascular: S1, S2, regular rate and rhythm  Abdomen: Soft, Non-tender, non-distended, positive bowel sounds  Neuro: No new focal deficits.   Extremities: No edema    Diagnostic Data:    Labs:  Recent Labs   Lab 24  0747 248 24  0621   WBC 6.6 7.7 6.5   HGB 13.3 13.6 12.7   MCV 79.2* 81.9 82.3   .0 304.0 285.0   INR  --   --  0.99       Recent Labs   Lab 24  0747 24  0837 24  1133 24  0621   *  --  99  --  106*   BUN 8*  --  11  --  11   CREATSERUM 0.81  --  0.91  --  1.00   CA 8.7  --  9.8  --  8.7     --  140  --  137   K 3.4*   < > 3.3* 3.7 3.7     --  109  --  107   CO2 23.0  --  27.0  --  27.0    < > = values in this interval not displayed.       Estimated Creatinine Clearance: 59.1 mL/min (based on SCr of 1 mg/dL).    Recent Labs   Lab 24  2208   TROPHS 8       Recent Labs   Lab 24  0621   PTP 13.1   INR 0.99                  Microbiology    No results found for this visit on  02/24/24.      Imaging: Reviewed in Epic.    Medications:    enalaprilat  0.625 mg Intravenous Q8H    pantoprazole  40 mg Intravenous Q12H       Assessment & Plan:      #Dysphagia  #Achalasia type II  Patient EGD planned for today but subsequently delayed until tomorrow  EGD performed 2/19  Manometry on 2/21 showing achalasia type II  Balloon dilation and Botox injection on 2/23  Repeat EGD 2/25 with retained oral secretions  Plan for G-POEM tomorrow  Clear liquid diet, pain control, IVF's, PPI  GI following    #Essential HTN - Enalaprilat   #Anxiety - prn valium         Ney Blackmon MD    Supplementary Documentation:     Quality:  DVT Mechanical Prophylaxis:   SCDs,    DVT Pharmacologic Prophylaxis   Medication   None                Code Status: Not on file  Huggins: No urinary catheter in place  Huggins Duration (in days):   Central line:    NADER:     Discharge is dependent on: Procedure   At this point Ms. Tovar is expected to be discharge to: Home    The 21st Century Cures Act makes medical notes like these available to patients in the interest of transparency. Please be advised this is a medical document. Medical documents are intended to carry relevant information, facts as evident, and the clinical opinion of the practitioner. The medical note is intended as peer to peer communication and may appear blunt or direct. It is written in medical language and may contain abbreviations or verbiage that are unfamiliar.

## 2024-02-28 NOTE — PLAN OF CARE
Patient alert and oriented x4. Breathing regular and easy. On RA. SR in tele. Npo. For EGD w/ Gastric Endoscopic Myotomy. Consent obtained. Morphine for pain. Valium and Zofran for nausea. Continent. Up ad cheaynne. Needs attended promptly.      Problem: Patient/Family Goals  Goal: Patient/Family Long Term Goal  Description: Patient's Long Term Goal: Back home     Interventions:  - GI to see  - See additional Care Plan goals for specific interventions  Outcome: Progressing  Goal: Patient/Family Short Term Goal  Description: Patient's Short Term Goal: Feel better    Interventions:   - Medication management  - See additional Care Plan goals for specific interventions  Outcome: Progressing     Problem: GASTROINTESTINAL - ADULT  Goal: Minimal or absence of nausea and vomiting  Description: INTERVENTIONS:  - Maintain adequate hydration with IV or PO as ordered and tolerated  - Nasogastric tube to low intermittent suction as ordered  - Evaluate effectiveness of ordered antiemetic medications  - Provide nonpharmacologic comfort measures as appropriate  - Advance diet as tolerated, if ordered  - Obtain nutritional consult as needed  - Evaluate fluid balance  Outcome: Progressing  Goal: Maintains or returns to baseline bowel function  Description: INTERVENTIONS:  - Assess bowel function  - Maintain adequate hydration with IV or PO as ordered and tolerated  - Evaluate effectiveness of GI medications  - Encourage mobilization and activity  - Obtain nutritional consult as needed  - Establish a toileting routine/schedule  - Consider collaborating with pharmacy to review patient's medication profile  Outcome: Progressing  Goal: Maintains adequate nutritional intake (undernourished)  Description: INTERVENTIONS:  - Monitor percentage of each meal consumed  - Identify factors contributing to decreased intake, treat as appropriate  - Assist with meals as needed  - Monitor I&O, WT and lab values  - Obtain nutritional consult as needed  -  Optimize oral hygiene and moisture  - Encourage food from home; allow for food preferences  - Enhance eating environment  Outcome: Progressing  Goal: Achieves appropriate nutritional intake (bariatric)  Description: INTERVENTIONS:  - Monitor for over-consumption  - Identify factors contributing to increased intake, treat as appropriate  - Monitor I&O, WT and lab values  - Obtain nutritional consult as needed  - Evaluate psychosocial factors contributing to over-consumption  Outcome: Progressing     Problem: PAIN - ADULT  Goal: Verbalizes/displays adequate comfort level or patient's stated pain goal  Description: INTERVENTIONS:  - Encourage pt to monitor pain and request assistance  - Assess pain using appropriate pain scale  - Administer analgesics based on type and severity of pain and evaluate response  - Implement non-pharmacological measures as appropriate and evaluate response  - Consider cultural and social influences on pain and pain management  - Manage/alleviate anxiety  - Utilize distraction and/or relaxation techniques  - Monitor for opioid side effects  - Notify MD/LIP if interventions unsuccessful or patient reports new pain  - Anticipate increased pain with activity and pre-medicate as appropriate  Outcome: Progressing

## 2024-02-28 NOTE — PLAN OF CARE
Patient alert and oriented; blood pressure elevated this morning, morning dose of enalaprit given early, and patient admitted to feeling anxious about procedure; lung sounds clear, on room air, no cough noted; no edema; continent of bowel and bladder, per patient her LBM was yesterday which was brown and formed; patient ambulates independently with a steady gait; pain to the mid chest/upper abdomen that was improved with dilaudid as ordered, then had pain to her lower abdomen which was improved with dilaudid as well; patient with nausea, says the valium works better; patient had complaints of itching, Dr Newman ordered benadryl which was given as ordered and helped; patient's procedure was cancelled and to be done tomorrow so patient is on a clear liquid diet- per patient she can only take small sips at a time.     1800: per patient, pain is much improved with dilaudid, but it does cause itching. Patient's oxygen saturation does drop to the upper 80's when she falls asleep after the dilaudid, 2 liters of oxygen via nasal cannula applied.     Problem: Patient/Family Goals  Goal: Patient/Family Long Term Goal  Description: Patient's Long Term Goal: Back home     Interventions:  - GI to see  - See additional Care Plan goals for specific interventions  Outcome: Progressing  Goal: Patient/Family Short Term Goal  Description: Patient's Short Term Goal: Feel better    Interventions:   - Medication management  - See additional Care Plan goals for specific interventions  Outcome: Progressing     Problem: GASTROINTESTINAL - ADULT  Goal: Minimal or absence of nausea and vomiting  Description: INTERVENTIONS:  - Maintain adequate hydration with IV or PO as ordered and tolerated  - Nasogastric tube to low intermittent suction as ordered  - Evaluate effectiveness of ordered antiemetic medications  - Provide nonpharmacologic comfort measures as appropriate  - Advance diet as tolerated, if ordered  - Obtain nutritional consult as  needed  - Evaluate fluid balance  Outcome: Progressing  Goal: Maintains or returns to baseline bowel function  Description: INTERVENTIONS:  - Assess bowel function  - Maintain adequate hydration with IV or PO as ordered and tolerated  - Evaluate effectiveness of GI medications  - Encourage mobilization and activity  - Obtain nutritional consult as needed  - Establish a toileting routine/schedule  - Consider collaborating with pharmacy to review patient's medication profile  Outcome: Progressing  Goal: Maintains adequate nutritional intake (undernourished)  Description: INTERVENTIONS:  - Monitor percentage of each meal consumed  - Identify factors contributing to decreased intake, treat as appropriate  - Assist with meals as needed  - Monitor I&O, WT and lab values  - Obtain nutritional consult as needed  - Optimize oral hygiene and moisture  - Encourage food from home; allow for food preferences  - Enhance eating environment  Outcome: Progressing  Goal: Achieves appropriate nutritional intake (bariatric)  Description: INTERVENTIONS:  - Monitor for over-consumption  - Identify factors contributing to increased intake, treat as appropriate  - Monitor I&O, WT and lab values  - Obtain nutritional consult as needed  - Evaluate psychosocial factors contributing to over-consumption  Outcome: Progressing     Problem: PAIN - ADULT  Goal: Verbalizes/displays adequate comfort level or patient's stated pain goal  Description: INTERVENTIONS:  - Encourage pt to monitor pain and request assistance  - Assess pain using appropriate pain scale  - Administer analgesics based on type and severity of pain and evaluate response  - Implement non-pharmacological measures as appropriate and evaluate response  - Consider cultural and social influences on pain and pain management  - Manage/alleviate anxiety  - Utilize distraction and/or relaxation techniques  - Monitor for opioid side effects  - Notify MD/LIP if interventions unsuccessful or  patient reports new pain  - Anticipate increased pain with activity and pre-medicate as appropriate  Outcome: Progressing

## 2024-02-28 NOTE — CM/SW NOTE
MSW met with pt to discuss SDOH needs. Pt was provided info on transitional housing and 2 shelter programs. We also discussed medicaid rides for medical appointments, FMLA, Short term Disability and rental assistance.

## 2024-02-29 ENCOUNTER — ANESTHESIA EVENT (OUTPATIENT)
Dept: ENDOSCOPY | Facility: HOSPITAL | Age: 39
End: 2024-02-29
Payer: MEDICAID

## 2024-02-29 ENCOUNTER — ANESTHESIA (OUTPATIENT)
Dept: ENDOSCOPY | Facility: HOSPITAL | Age: 39
End: 2024-02-29
Payer: MEDICAID

## 2024-02-29 LAB
ANION GAP SERPL CALC-SCNC: 5 MMOL/L (ref 0–18)
BASOPHILS # BLD AUTO: 0.05 X10(3) UL (ref 0–0.2)
BASOPHILS NFR BLD AUTO: 0.9 %
BUN BLD-MCNC: 7 MG/DL (ref 9–23)
CALCIUM BLD-MCNC: 8.4 MG/DL (ref 8.5–10.1)
CHLORIDE SERPL-SCNC: 106 MMOL/L (ref 98–112)
CO2 SERPL-SCNC: 28 MMOL/L (ref 21–32)
CREAT BLD-MCNC: 0.81 MG/DL
EGFRCR SERPLBLD CKD-EPI 2021: 94 ML/MIN/1.73M2 (ref 60–?)
EOSINOPHIL # BLD AUTO: 0.15 X10(3) UL (ref 0–0.7)
EOSINOPHIL NFR BLD AUTO: 2.7 %
ERYTHROCYTE [DISTWIDTH] IN BLOOD BY AUTOMATED COUNT: 14.7 %
GLUCOSE BLD-MCNC: 121 MG/DL (ref 70–99)
HCT VFR BLD AUTO: 41.9 %
HGB BLD-MCNC: 12.9 G/DL
IMM GRANULOCYTES # BLD AUTO: 0.01 X10(3) UL (ref 0–1)
IMM GRANULOCYTES NFR BLD: 0.2 %
LYMPHOCYTES # BLD AUTO: 2.72 X10(3) UL (ref 1–4)
LYMPHOCYTES NFR BLD AUTO: 49.4 %
MCH RBC QN AUTO: 26.1 PG (ref 26–34)
MCHC RBC AUTO-ENTMCNC: 30.8 G/DL (ref 31–37)
MCV RBC AUTO: 84.8 FL
MONOCYTES # BLD AUTO: 0.36 X10(3) UL (ref 0.1–1)
MONOCYTES NFR BLD AUTO: 6.5 %
NEUTROPHILS # BLD AUTO: 2.22 X10 (3) UL (ref 1.5–7.7)
NEUTROPHILS # BLD AUTO: 2.22 X10(3) UL (ref 1.5–7.7)
NEUTROPHILS NFR BLD AUTO: 40.3 %
OSMOLALITY SERPL CALC.SUM OF ELEC: 287 MOSM/KG (ref 275–295)
PLATELET # BLD AUTO: 274 10(3)UL (ref 150–450)
POTASSIUM SERPL-SCNC: 3.3 MMOL/L (ref 3.5–5.1)
RBC # BLD AUTO: 4.94 X10(6)UL
SODIUM SERPL-SCNC: 139 MMOL/L (ref 136–145)
WBC # BLD AUTO: 5.5 X10(3) UL (ref 4–11)

## 2024-02-29 PROCEDURE — 99232 SBSQ HOSP IP/OBS MODERATE 35: CPT | Performed by: HOSPITALIST

## 2024-02-29 PROCEDURE — 0D848ZZ DIVISION OF ESOPHAGOGASTRIC JUNCTION, VIA NATURAL OR ARTIFICIAL OPENING ENDOSCOPIC: ICD-10-PCS | Performed by: INTERNAL MEDICINE

## 2024-02-29 DEVICE — REPLAY HEMOSTASIS CLIP, 11MM SPAN
Type: IMPLANTABLE DEVICE | Site: ESOPHAGUS | Status: FUNCTIONAL
Brand: REPLAY

## 2024-02-29 DEVICE — REPLAY HEMOSTASIS CLIP, 16MM SPAN
Type: IMPLANTABLE DEVICE | Site: ESOPHAGUS | Status: FUNCTIONAL
Brand: REPLAY

## 2024-02-29 RX ORDER — ONDANSETRON 2 MG/ML
INJECTION INTRAMUSCULAR; INTRAVENOUS
Status: COMPLETED
Start: 2024-02-29 | End: 2024-02-29

## 2024-02-29 RX ORDER — SODIUM CHLORIDE, SODIUM LACTATE, POTASSIUM CHLORIDE, CALCIUM CHLORIDE 600; 310; 30; 20 MG/100ML; MG/100ML; MG/100ML; MG/100ML
INJECTION, SOLUTION INTRAVENOUS CONTINUOUS PRN
Status: DISCONTINUED | OUTPATIENT
Start: 2024-02-29 | End: 2024-02-29 | Stop reason: SURG

## 2024-02-29 RX ORDER — ACETAMINOPHEN 500 MG
1000 TABLET ORAL ONCE AS NEEDED
Status: DISCONTINUED | OUTPATIENT
Start: 2024-02-29 | End: 2024-02-29 | Stop reason: HOSPADM

## 2024-02-29 RX ORDER — HYDROMORPHONE HYDROCHLORIDE 1 MG/ML
0.4 INJECTION, SOLUTION INTRAMUSCULAR; INTRAVENOUS; SUBCUTANEOUS EVERY 5 MIN PRN
Status: DISCONTINUED | OUTPATIENT
Start: 2024-02-29 | End: 2024-02-29 | Stop reason: HOSPADM

## 2024-02-29 RX ORDER — METRONIDAZOLE 500 MG/100ML
500 INJECTION, SOLUTION INTRAVENOUS EVERY 8 HOURS
Status: DISCONTINUED | OUTPATIENT
Start: 2024-02-29 | End: 2024-02-29

## 2024-02-29 RX ORDER — PHENYLEPHRINE HCL 10 MG/ML
VIAL (ML) INJECTION AS NEEDED
Status: DISCONTINUED | OUTPATIENT
Start: 2024-02-29 | End: 2024-02-29 | Stop reason: SURG

## 2024-02-29 RX ORDER — MIDAZOLAM HYDROCHLORIDE 1 MG/ML
1 INJECTION INTRAMUSCULAR; INTRAVENOUS EVERY 5 MIN PRN
Status: DISCONTINUED | OUTPATIENT
Start: 2024-02-29 | End: 2024-02-29 | Stop reason: HOSPADM

## 2024-02-29 RX ORDER — HYDROMORPHONE HYDROCHLORIDE 1 MG/ML
INJECTION, SOLUTION INTRAMUSCULAR; INTRAVENOUS; SUBCUTANEOUS
Status: COMPLETED
Start: 2024-02-29 | End: 2024-02-29

## 2024-02-29 RX ORDER — LABETALOL HYDROCHLORIDE 5 MG/ML
INJECTION, SOLUTION INTRAVENOUS AS NEEDED
Status: DISCONTINUED | OUTPATIENT
Start: 2024-02-29 | End: 2024-02-29 | Stop reason: SURG

## 2024-02-29 RX ORDER — CEFAZOLIN SODIUM 1 G/3ML
INJECTION, POWDER, FOR SOLUTION INTRAMUSCULAR; INTRAVENOUS AS NEEDED
Status: DISCONTINUED | OUTPATIENT
Start: 2024-02-29 | End: 2024-02-29 | Stop reason: SURG

## 2024-02-29 RX ORDER — DEXAMETHASONE SODIUM PHOSPHATE 4 MG/ML
VIAL (ML) INJECTION AS NEEDED
Status: DISCONTINUED | OUTPATIENT
Start: 2024-02-29 | End: 2024-02-29 | Stop reason: SURG

## 2024-02-29 RX ORDER — NALOXONE HYDROCHLORIDE 0.4 MG/ML
0.08 INJECTION, SOLUTION INTRAMUSCULAR; INTRAVENOUS; SUBCUTANEOUS AS NEEDED
Status: DISCONTINUED | OUTPATIENT
Start: 2024-02-29 | End: 2024-02-29 | Stop reason: HOSPADM

## 2024-02-29 RX ORDER — MEPERIDINE HYDROCHLORIDE 25 MG/ML
12.5 INJECTION INTRAMUSCULAR; INTRAVENOUS; SUBCUTANEOUS AS NEEDED
Status: DISCONTINUED | OUTPATIENT
Start: 2024-02-29 | End: 2024-02-29 | Stop reason: HOSPADM

## 2024-02-29 RX ORDER — HYDROCODONE BITARTRATE AND ACETAMINOPHEN 10; 325 MG/1; MG/1
2 TABLET ORAL ONCE AS NEEDED
Status: DISCONTINUED | OUTPATIENT
Start: 2024-02-29 | End: 2024-02-29 | Stop reason: HOSPADM

## 2024-02-29 RX ORDER — SODIUM CHLORIDE 9 MG/ML
INJECTION, SOLUTION INTRAVENOUS CONTINUOUS
Status: DISCONTINUED | OUTPATIENT
Start: 2024-02-29 | End: 2024-03-01

## 2024-02-29 RX ORDER — LIDOCAINE HYDROCHLORIDE 10 MG/ML
INJECTION, SOLUTION EPIDURAL; INFILTRATION; INTRACAUDAL; PERINEURAL AS NEEDED
Status: DISCONTINUED | OUTPATIENT
Start: 2024-02-29 | End: 2024-02-29 | Stop reason: SURG

## 2024-02-29 RX ORDER — HYDROMORPHONE HYDROCHLORIDE 1 MG/ML
0.2 INJECTION, SOLUTION INTRAMUSCULAR; INTRAVENOUS; SUBCUTANEOUS EVERY 5 MIN PRN
Status: DISCONTINUED | OUTPATIENT
Start: 2024-02-29 | End: 2024-02-29 | Stop reason: HOSPADM

## 2024-02-29 RX ORDER — METRONIDAZOLE 500 MG/100ML
500 INJECTION, SOLUTION INTRAVENOUS EVERY 8 HOURS
Status: DISCONTINUED | OUTPATIENT
Start: 2024-02-29 | End: 2024-03-02

## 2024-02-29 RX ORDER — HYDROMORPHONE HYDROCHLORIDE 1 MG/ML
1 INJECTION, SOLUTION INTRAMUSCULAR; INTRAVENOUS; SUBCUTANEOUS ONCE
Status: COMPLETED | OUTPATIENT
Start: 2024-02-29 | End: 2024-02-29

## 2024-02-29 RX ORDER — ROCURONIUM BROMIDE 10 MG/ML
INJECTION, SOLUTION INTRAVENOUS AS NEEDED
Status: DISCONTINUED | OUTPATIENT
Start: 2024-02-29 | End: 2024-02-29 | Stop reason: SURG

## 2024-02-29 RX ORDER — HYDROCODONE BITARTRATE AND ACETAMINOPHEN 10; 325 MG/1; MG/1
1 TABLET ORAL ONCE AS NEEDED
Status: DISCONTINUED | OUTPATIENT
Start: 2024-02-29 | End: 2024-02-29 | Stop reason: HOSPADM

## 2024-02-29 RX ORDER — SODIUM CHLORIDE, SODIUM LACTATE, POTASSIUM CHLORIDE, CALCIUM CHLORIDE 600; 310; 30; 20 MG/100ML; MG/100ML; MG/100ML; MG/100ML
INJECTION, SOLUTION INTRAVENOUS CONTINUOUS
Status: DISCONTINUED | OUTPATIENT
Start: 2024-02-29 | End: 2024-02-29 | Stop reason: HOSPADM

## 2024-02-29 RX ORDER — LEVOFLOXACIN 5 MG/ML
500 INJECTION, SOLUTION INTRAVENOUS EVERY 24 HOURS
Status: DISCONTINUED | OUTPATIENT
Start: 2024-02-29 | End: 2024-03-02

## 2024-02-29 RX ORDER — HYDROMORPHONE HYDROCHLORIDE 1 MG/ML
0.6 INJECTION, SOLUTION INTRAMUSCULAR; INTRAVENOUS; SUBCUTANEOUS EVERY 5 MIN PRN
Status: DISCONTINUED | OUTPATIENT
Start: 2024-02-29 | End: 2024-02-29 | Stop reason: HOSPADM

## 2024-02-29 RX ORDER — ONDANSETRON 2 MG/ML
4 INJECTION INTRAMUSCULAR; INTRAVENOUS EVERY 6 HOURS PRN
Status: DISCONTINUED | OUTPATIENT
Start: 2024-02-29 | End: 2024-02-29 | Stop reason: HOSPADM

## 2024-02-29 RX ADMIN — PHENYLEPHRINE HCL 100 MCG: 10 MG/ML VIAL (ML) INJECTION at 09:08:00

## 2024-02-29 RX ADMIN — LIDOCAINE HYDROCHLORIDE 50 MG: 10 INJECTION, SOLUTION EPIDURAL; INFILTRATION; INTRACAUDAL; PERINEURAL at 08:18:00

## 2024-02-29 RX ADMIN — ONDANSETRON 4 MG: 2 INJECTION INTRAMUSCULAR; INTRAVENOUS at 08:27:00

## 2024-02-29 RX ADMIN — LABETALOL HYDROCHLORIDE 5 MG: 5 INJECTION, SOLUTION INTRAVENOUS at 08:57:00

## 2024-02-29 RX ADMIN — SODIUM CHLORIDE, SODIUM LACTATE, POTASSIUM CHLORIDE, CALCIUM CHLORIDE: 600; 310; 30; 20 INJECTION, SOLUTION INTRAVENOUS at 08:10:00

## 2024-02-29 RX ADMIN — DEXAMETHASONE SODIUM PHOSPHATE 4 MG: 4 MG/ML VIAL (ML) INJECTION at 08:27:00

## 2024-02-29 RX ADMIN — CEFAZOLIN SODIUM 2 G: 1 INJECTION, POWDER, FOR SOLUTION INTRAMUSCULAR; INTRAVENOUS at 09:08:00

## 2024-02-29 RX ADMIN — ROCURONIUM BROMIDE 40 MG: 10 INJECTION, SOLUTION INTRAVENOUS at 08:21:00

## 2024-02-29 NOTE — PLAN OF CARE
Patient alert and oriented; VSS; cardiac monitor showing SR; lung sounds clear, on room air during the day and 2 liters with sleep while taking IV pain medications, no cough noted; patient complained of feeling like she has something stuck in her throat, did message with SANDI Singh, said it can be normal due to inflammation- explained to patient; no edema; continent of bowel and bladder with LBM 2/27; IVF infusing as ordered; up with a standby assist due medications and IV pole; patient was having 10/10 pain to her throat 1 hour after 0.8 mg of dilaudid was given as ordered, informed Dr Blackmon and received a one time 1 mg of dilaudid; patient is strict NPO, and is aware; valium given as ordered for nausea which patient states helps, zofran does not work for her.       Problem: Patient/Family Goals  Goal: Patient/Family Long Term Goal  Description: Patient's Long Term Goal: Back home     Interventions:  - GI to see  - See additional Care Plan goals for specific interventions  Outcome: Progressing  Goal: Patient/Family Short Term Goal  Description: Patient's Short Term Goal: Feel better    Interventions:   - Medication management  - See additional Care Plan goals for specific interventions  Outcome: Progressing     Problem: GASTROINTESTINAL - ADULT  Goal: Minimal or absence of nausea and vomiting  Description: INTERVENTIONS:  - Maintain adequate hydration with IV or PO as ordered and tolerated  - Nasogastric tube to low intermittent suction as ordered  - Evaluate effectiveness of ordered antiemetic medications  - Provide nonpharmacologic comfort measures as appropriate  - Advance diet as tolerated, if ordered  - Obtain nutritional consult as needed  - Evaluate fluid balance  Outcome: Progressing  Goal: Maintains or returns to baseline bowel function  Description: INTERVENTIONS:  - Assess bowel function  - Maintain adequate hydration with IV or PO as ordered and tolerated  - Evaluate effectiveness of GI  medications  - Encourage mobilization and activity  - Obtain nutritional consult as needed  - Establish a toileting routine/schedule  - Consider collaborating with pharmacy to review patient's medication profile  Outcome: Progressing  Goal: Maintains adequate nutritional intake (undernourished)  Description: INTERVENTIONS:  - Monitor percentage of each meal consumed  - Identify factors contributing to decreased intake, treat as appropriate  - Assist with meals as needed  - Monitor I&O, WT and lab values  - Obtain nutritional consult as needed  - Optimize oral hygiene and moisture  - Encourage food from home; allow for food preferences  - Enhance eating environment  Outcome: Progressing  Goal: Achieves appropriate nutritional intake (bariatric)  Description: INTERVENTIONS:  - Monitor for over-consumption  - Identify factors contributing to increased intake, treat as appropriate  - Monitor I&O, WT and lab values  - Obtain nutritional consult as needed  - Evaluate psychosocial factors contributing to over-consumption  Outcome: Progressing     Problem: PAIN - ADULT  Goal: Verbalizes/displays adequate comfort level or patient's stated pain goal  Description: INTERVENTIONS:  - Encourage pt to monitor pain and request assistance  - Assess pain using appropriate pain scale  - Administer analgesics based on type and severity of pain and evaluate response  - Implement non-pharmacological measures as appropriate and evaluate response  - Consider cultural and social influences on pain and pain management  - Manage/alleviate anxiety  - Utilize distraction and/or relaxation techniques  - Monitor for opioid side effects  - Notify MD/LIP if interventions unsuccessful or patient reports new pain  - Anticipate increased pain with activity and pre-medicate as appropriate  Outcome: Progressing

## 2024-02-29 NOTE — PLAN OF CARE
Assumed care around 1930. A/Ox4. On RA w/ sats >90. Monitor shows NSR. Continent of bowel and bladder. Reports abd pain and nausea, managed w/ medication. Up ad cheyanne in the room, steady gait noted. Plan for POEM today. Safety precautions in place, call light within reach.     Problem: Patient/Family Goals  Goal: Patient/Family Long Term Goal  Description: Patient's Long Term Goal: Back home     Interventions:  - GI to see  - See additional Care Plan goals for specific interventions  Outcome: Progressing  Goal: Patient/Family Short Term Goal  Description: Patient's Short Term Goal: Feel better    Interventions:   - Medication management  - See additional Care Plan goals for specific interventions  Outcome: Progressing     Problem: GASTROINTESTINAL - ADULT  Goal: Minimal or absence of nausea and vomiting  Description: INTERVENTIONS:  - Maintain adequate hydration with IV or PO as ordered and tolerated  - Nasogastric tube to low intermittent suction as ordered  - Evaluate effectiveness of ordered antiemetic medications  - Provide nonpharmacologic comfort measures as appropriate  - Advance diet as tolerated, if ordered  - Obtain nutritional consult as needed  - Evaluate fluid balance  Outcome: Progressing  Goal: Maintains or returns to baseline bowel function  Description: INTERVENTIONS:  - Assess bowel function  - Maintain adequate hydration with IV or PO as ordered and tolerated  - Evaluate effectiveness of GI medications  - Encourage mobilization and activity  - Obtain nutritional consult as needed  - Establish a toileting routine/schedule  - Consider collaborating with pharmacy to review patient's medication profile  Outcome: Progressing  Goal: Maintains adequate nutritional intake (undernourished)  Description: INTERVENTIONS:  - Monitor percentage of each meal consumed  - Identify factors contributing to decreased intake, treat as appropriate  - Assist with meals as needed  - Monitor I&O, WT and lab values  - Obtain  nutritional consult as needed  - Optimize oral hygiene and moisture  - Encourage food from home; allow for food preferences  - Enhance eating environment  Outcome: Progressing  Goal: Achieves appropriate nutritional intake (bariatric)  Description: INTERVENTIONS:  - Monitor for over-consumption  - Identify factors contributing to increased intake, treat as appropriate  - Monitor I&O, WT and lab values  - Obtain nutritional consult as needed  - Evaluate psychosocial factors contributing to over-consumption  Outcome: Progressing     Problem: PAIN - ADULT  Goal: Verbalizes/displays adequate comfort level or patient's stated pain goal  Description: INTERVENTIONS:  - Encourage pt to monitor pain and request assistance  - Assess pain using appropriate pain scale  - Administer analgesics based on type and severity of pain and evaluate response  - Implement non-pharmacological measures as appropriate and evaluate response  - Consider cultural and social influences on pain and pain management  - Manage/alleviate anxiety  - Utilize distraction and/or relaxation techniques  - Monitor for opioid side effects  - Notify MD/LIP if interventions unsuccessful or patient reports new pain  - Anticipate increased pain with activity and pre-medicate as appropriate  Outcome: Progressing

## 2024-02-29 NOTE — PROGRESS NOTES
[facility]     Hospitalist Progress Note     Mago Tovar Patient Status:  Inpatient    3/31/1985 MRN [id number]   Location [facility] 2NE-A Attending Ney Blackmon MD   Hosp Day # 1 PCP None Pcp     Chief Complaint: Dysphagia     Subjective:     Patient doing well after her procedure, some pain but controlled.  Denies fever, chills, n/v.  No other acute complaints.      Objective:    Review of Systems:   A comprehensive review of systems was completed; pertinent positive and negatives stated in subjective.    Vital signs:  Temp:  [97.8 °F (36.6 °C)-98.3 °F (36.8 °C)] 98.3 °F (36.8 °C)  Pulse:  [70-92] 83  Resp:  [16-22] 18  BP: (106-148)/() 145/99  SpO2:  [91 %-98 %] 95 %    Physical Exam:    General: No acute distress, pleasant   Respiratory: No wheezes, no rhonchi  Cardiovascular: S1, S2, regular rate and rhythm  Abdomen: Soft, Non-tender, non-distended, positive bowel sounds  Neuro: No new focal deficits.   Extremities: No edema    Diagnostic Data:    Labs:  Recent Labs   Lab 24  0621 24  0602   WBC 7.7 6.5 5.5   HGB 13.6 12.7 12.9   MCV 81.9 82.3 84.8   .0 285.0 274.0   INR  --  0.99  --        Recent Labs   Lab 24  1133 24  0621 24  0602   GLU 99  --  106* 121*   BUN 11  --  11 7*   CREATSERUM 0.91  --  1.00 0.81   CA 9.8  --  8.7 8.4*     --  137 139   K 3.3* 3.7 3.7 3.3*     --  107 106   CO2 27.0  --  27.0 28.0       Estimated Creatinine Clearance: 73 mL/min (based on SCr of 0.81 mg/dL).    Recent Labs   Lab 24   TROPHS 8       Recent Labs   Lab 24  0621   PTP 13.1   INR 0.99                  Microbiology    No results found for this visit on 24.      Imaging: Reviewed in Epic.    Medications:    enalaprilat  0.625 mg Intravenous Q8H    pantoprazole  40 mg Intravenous Q12H       Assessment & Plan:      #Dysphagia  #Achalasia type II  Patient EGD planned for today but  subsequently delayed until tomorrow  EGD performed 2/19  Manometry on 2/21 showing achalasia type II  Balloon dilation and Botox injection on 2/23  Repeat EGD 2/25 with retained oral secretions  G-POEM 2/29  NPO, IVF's, pain control  Esphagrogram tomorrow, possible discharge tomorrow  GI following    #Essential HTN - Enalaprilat   #Anxiety - prn valium         Ney Blackmon MD    Supplementary Documentation:     Quality:  DVT Mechanical Prophylaxis:   SCDs,    DVT Pharmacologic Prophylaxis   Medication   None                Code Status: Not on file  Huggins: No urinary catheter in place  Huggins Duration (in days):   Central line:    NADER: 3/1/2024    Discharge is dependent on: Procedure   At this point Ms. Tovar is expected to be discharge to: Home    The 21st Century Cures Act makes medical notes like these available to patients in the interest of transparency. Please be advised this is a medical document. Medical documents are intended to carry relevant information, facts as evident, and the clinical opinion of the practitioner. The medical note is intended as peer to peer communication and may appear blunt or direct. It is written in medical language and may contain abbreviations or verbiage that are unfamiliar.

## 2024-02-29 NOTE — OPERATIVE REPORT
Mago Tovar Patient Status:  Inpatient    3/31/1985 MRN [id number]   [facility] Attending Ney Blackmon MD   Date 2024 PCP None Pcp     PREOPERATIVE DIAGNOSIS/INDICATION: Achalasia type II, dysphagia, chest pain  POSTOPERTATIVE DIAGNOSIS: Same  PROCEDURE PERFORMED: EGD with peroral endoscopic myotomy POEM  TIME OUT WAS PERFORMED    SEDATION: General Anesthesia with endotracheal intubation    INFORMED CONSENT: Risks, benefits and alternatives to the procedure were explained to the patient including but not limited to bleeding, infection, perforation, adverse drug reactions, pancreatitis and the need for hospitalization and surgery if this occurs, the patient understands and agrees to procedure.  PROCEDURE DESCRIPTION: A regular upper endoscope was introduced into the patient’s mouth, hypo pharynx, esophagus, stomach and the first and second portion of the duodenum, retroflexion of the endoscope was performed in the stomach. Careful examination of the above described areas was performed on withdrawal of the endoscope. The patient tolerated the procedure well, there were no immediate complication immediately following the procedure, and the patient was transferred to recovery in stable condition.  FINDINGS:  ESOPHAGUS: Mid/distal esophageal vigorous spasm, small amount of fluid residue   EGJ: Tight to the passing of the EGD scope, small hiatal hernia  STOMACH: Normal  DUODENUM: Normal  THERAPEUTICS: THERAPEUTICS: Under CO2 insufflation we injected at the mid esophagus 30 from the incisors with sclero needle and 0.9% saline, at the submucosa, after this a small longitudinal mucosal incision at the at the posterior esophageal wall was performed with ERBE needle hybrid knife PreciseSect,  submucosal tunneling was performed with ERBE needle hybrid knife forcep coag effect 2, 50 feliciano and PreciseSect setting, interposing vessels were treated with coag grasped soft  coag setting, dissection was aided with injection combination Hetastarch and methylene blue, dissection was continued until we passed the LES and extended for 2-3 cm distal to this, myotomy of the esophageal circular fibers leaving the longitudinal fibers behind selectively  was performed with ERBE hybrid knife PreciseSect setting, mucosal incision closure was performed, in a zipper like fashion with endoclips x 6, post procedure appearance was satisfactory, patient tolerated the procedure well and was transferred to PACU in stable condition    RECOMMENDATIONS:   Post EGD POEM precautions, watch for bleeding, infection, perforation, adverse drug reactions   NPO until cleared for clears by GI after esophagus Xray  Potential discharge tomorrow  Prophylactic antibiotics  Esophagogram in AM Friday  Continue Antibiotics  Discharge on Levo/flagyl x 7 days  IV PPI's q 12 hrs, Omeprazole 40 mg PO BID upon discharge  Once discharged remain on clears x 24 hrs, then full liquids x 5 days, then soft diet as tolerated  OV follow up GI clinic in 1-2 weeks    Perico Roa MD  2/29/2024  9:12 AM

## 2024-02-29 NOTE — ANESTHESIA PREPROCEDURE EVALUATION
PRE-OP EVALUATION    Patient Name: Mago Tovar    Admit Diagnosis: Hypokalemia [E87.6]  Intractable vomiting [R11.10]  Chest pain, atypical [R07.89]    Pre-op Diagnosis: INPT    PERORAL ENDOSCOPIC MYOTOMY    Anesthesia Procedure: PERORAL ENDOSCOPIC MYOTOMY    Surgeon(s) and Role:     * Perico Roa MD - Primary    Pre-op vitals reviewed.  Temp: 98.3 °F (36.8 °C)  Pulse: 83  Resp: 18  BP: 145/99  SpO2: 95 %  Body mass index is 36.25 kg/m².    Current medications reviewed.  Hospital Medications:  • HYDROmorphone (Dilaudid) 1 MG/ML injection 0.2 mg  0.2 mg Intravenous Q2H PRN    Or   • HYDROmorphone (Dilaudid) 1 MG/ML injection 0.4 mg  0.4 mg Intravenous Q2H PRN    Or   • HYDROmorphone (Dilaudid) 1 MG/ML injection 0.8 mg  0.8 mg Intravenous Q2H PRN   • diphenhydrAMINE (Benadryl) 50 mg/mL  injection 12.5 mg  12.5 mg Intravenous Q6H PRN   • sodium chloride 0.9% infusion   Intravenous Continuous   • enalaprilat (Vasotec) 1.25 mg/mL injection 0.625 mg  0.625 mg Intravenous Q8H   • lidocaine 2% topical sterile jelly    PRN   • [COMPLETED] iopamidol 76% (ISOVUE-370) injection for power injector  75 mL Intravenous ONCE PRN   • melatonin tab 3 mg  3 mg Oral Nightly PRN   • acetaminophen (Tylenol Extra Strength) tab 500 mg  500 mg Oral Q4H PRN   • ondansetron (Zofran) 4 MG/2ML injection 4 mg  4 mg Intravenous Q6H PRN   • [COMPLETED] diazepam (Valium) 5 mg/mL injection 5 mg  5 mg Intravenous Once   • [COMPLETED] ondansetron (Zofran) 4 MG/2ML injection 4 mg  4 mg Intravenous Once   • diazepam (Valium) 5 mg/mL injection 2.5 mg  2.5 mg Intravenous Q4H PRN   • pantoprazole (Protonix) 40 mg in sodium chloride 0.9% PF 10 mL IV push  40 mg Intravenous Q12H   • hydrALAzine (Apresoline) 20 mg/mL injection 10 mg  10 mg Intravenous Q6H PRN   • [COMPLETED] sodium chloride 0.9 % IV bolus 1,000 mL  1,000 mL Intravenous Once   • [COMPLETED] potassium chloride 20 mEq/100mL IVPB premix 20 mEq  20 mEq Intravenous Once        Outpatient Medications:     Medications Prior to Admission   Medication Sig Dispense Refill Last Dose   • amLODIPine 10 MG Oral Tab Take 1 tablet (10 mg total) by mouth daily. 90 tablet 0 2/23/2024 at 0900   • losartan 100 MG Oral Tab Take 1 tablet (100 mg total) by mouth daily. 90 tablet 0 2/24/2024 at 0900   • clonazePAM 0.5 MG Oral Tab Take 1 tablet (0.5 mg total) by mouth 2 (two) times daily as needed for Anxiety. 30 tablet 0 Past Week   • pantoprazole 40 MG Oral Tab EC Take 1 tablet (40 mg total) by mouth every morning before breakfast. 30 tablet 3 2/23/2024 at 0900   • QUEtiapine 25 MG Oral Tab Take 1 tablet (25 mg total) by mouth nightly. 90 tablet 0 2/23/2024 at 2100   • sertraline 50 MG Oral Tab Take 1 tablet (50 mg total) by mouth daily. 30 tablet 1 2/24/2024 at 0900       Allergies: Ketorolac tromethamine, Metoclopramide, Naproxen, and Prochlorperazine      Anesthesia Evaluation    Patient summary reviewed.    Anesthetic Complications           GI/Hepatic/Renal      (+) GERD                           Cardiovascular                (+) obesity  (+) hypertension                                     Endo/Other                                  Pulmonary                           Neuro/Psych                                    Past Surgical History:   Procedure Laterality Date   • APPENDECTOMY     • REMOVAL GALLBLADDER     • TOTAL ABDOM HYSTERECTOMY       Social History     Socioeconomic History   • Marital status: Single   Tobacco Use   • Smoking status: Never   Vaping Use   • Vaping Use: Never used   Substance and Sexual Activity   • Alcohol use: Yes     Comment: social   • Drug use: Never     History   Drug Use Unknown     Available pre-op labs reviewed.  Lab Results   Component Value Date    WBC 5.5 02/29/2024    RBC 4.94 02/29/2024    HGB 12.9 02/29/2024    HCT 41.9 02/29/2024    MCV 84.8 02/29/2024    MCH 26.1 02/29/2024    MCHC 30.8 (L) 02/29/2024    RDW 14.7 02/29/2024    .0 02/29/2024      Lab Results   Component Value Date     02/28/2024    K 3.7 02/28/2024     02/28/2024    CO2 27.0 02/28/2024    BUN 11 02/28/2024    CREATSERUM 1.00 02/28/2024     (H) 02/28/2024    CA 8.7 02/28/2024     Lab Results   Component Value Date    INR 0.99 02/28/2024         Airway      Mallampati: II  Mouth opening: 3 FB  TM distance: < 4 cm  Neck ROM: limited Cardiovascular    Cardiovascular exam normal.  Rhythm: regular  Rate: normal     Dental             Pulmonary    Pulmonary exam normal.                 Other findings          ASA: 3   Plan: general  NPO status verified and patient meets guidelines.          Plan/risks discussed with: patient            Present on Admission:  • Intractable vomiting  • Anxiety  • Esophageal obstruction due to food impaction  • Primary hypertension  • Esophageal reflux  • Achalasia

## 2024-02-29 NOTE — ANESTHESIA POSTPROCEDURE EVALUATION
[facility] Patient Status:  Inpatient   Age/Gender 38 year old female MRN [id number]   Location [facility] ENDOSCOPY [facility]IN CENTER Attending Ney Blackmon MD   Hosp Day # 1 PCP None Pcp       Anesthesia Post-op Note    PERORAL ENDOSCOPIC MYOTOMY with clip placement x6    Procedure Summary       Date: 02/29/24 Room / Location:  ENDOSCOPY 02 / [facility] ENDOSCOPY    Anesthesia Start: 0818 Anesthesia Stop: 0923    Procedure: PERORAL ENDOSCOPIC MYOTOMY with clip placement x6 Diagnosis: (achalasia)    Surgeons: Perico Roa MD Anesthesiologist: Kevin Sánchez MD    Anesthesia Type: general ASA Status: 3            Anesthesia Type: general    Vitals Value Taken Time   /100 02/29/24 0923   Temp 97 °F (36.1 °C) 02/29/24 0923   Pulse 76 02/29/24 0923   Resp 16 02/29/24 0923   SpO2 94 % 02/29/24 0923       Patient Location: PACU    Anesthesia Type: general    Airway Patency: patent and extubated    Postop Pain Control: adequate    Mental Status: mildly sedated but able to meaningfully participate in the post-anesthesia evaluation    Nausea/Vomiting: none    Cardiopulmonary/Hydration status: stable euvolemic    Complications: no apparent anesthesia related complications    Postop vital signs: stable    Dental Exam: Unchanged from Preop               Patient is a 49y old  Male who presents with a chief complaint of left foot infection/bleeding (21 Jun 2020 14:44)      Reason For Consult: dm2 uncontrolled    HPI:  49year old Male seen at [facility] ED for left foot TMA wound. Pt was seen in wound care by Dr. Hale today for his left foot TMA (DOS 5.20.2020) Pt relates he went to [facility] last week for the wound and states that the wound was packed and redressed. Pt presented today and wound was painful and foul smelling.     Denies any fever, chills, nausea, vomiting, chest pain, shortness of breath, or calf pain at this time. (16 Jun 2020 12:13)      PAST MEDICAL & SURGICAL HISTORY:  Cerebrovascular accident  CAD S/P percutaneous coronary angioplasty  Osteomyelitis: s/p debridement  History of non-ST elevation myocardial infarction (NSTEMI)  Pulmonary embolism  Perforated gastric ulcer: s/p emergent ex-lap omentopexy and plication 6/2019  BPH (benign prostatic hyperplasia)  Hypertension  Afib  Diabetes mellitus with no complication  Diabetes  Traumatic amputation of left foot, initial encounter  Perforated gastric ulcer  H/O abdominal surgery      FAMILY HISTORY:  FH: stroke: Father  FH: coronary artery disease: Father  FH: pulmonary embolism: Mother        Social History:    MEDICATIONS  (STANDING):  ascorbic acid 1000 milliGRAM(s) Oral daily  atorvastatin 40 milliGRAM(s) Oral at bedtime  bethanechol 25 milliGRAM(s) Oral three times a day  cefTRIAXone   IVPB 2000 milliGRAM(s) IV Intermittent every 24 hours  dextrose 5%. 1000 milliLiter(s) (50 mL/Hr) IV Continuous <Continuous>  dextrose 50% Injectable 12.5 Gram(s) IV Push once  dextrose 50% Injectable 25 Gram(s) IV Push once  dextrose 50% Injectable 25 Gram(s) IV Push once  enoxaparin Injectable 100 milliGRAM(s) SubCutaneous every 12 hours  famotidine    Tablet 20 milliGRAM(s) Oral every 24 hours  ferrous    sulfate 325 milliGRAM(s) Oral two times a day  gabapentin 100 milliGRAM(s) Oral at bedtime  insulin glargine Injectable (LANTUS) 10 Unit(s) SubCutaneous at bedtime  insulin lispro (HumaLOG) corrective regimen sliding scale   SubCutaneous three times a day before meals  insulin lispro (HumaLOG) corrective regimen sliding scale   SubCutaneous at bedtime  insulin lispro Injectable (HumaLOG) 5 Unit(s) SubCutaneous three times a day with meals  losartan 25 milliGRAM(s) Oral daily  methimazole 10 milliGRAM(s) Oral daily  metoprolol tartrate 50 milliGRAM(s) Oral every 12 hours  multivitamin 1 Tablet(s) Oral daily  pantoprazole    Tablet 40 milliGRAM(s) Oral before breakfast  phenazopyridine 100 milliGRAM(s) Oral every 8 hours  potassium chloride    Tablet ER 10 milliEquivalent(s) Oral daily  senna 2 Tablet(s) Oral at bedtime  sucralfate 1 Gram(s) Oral four times a day  tamsulosin 0.8 milliGRAM(s) Oral at bedtime    MEDICATIONS  (PRN):  acetaminophen   Tablet .. 650 milliGRAM(s) Oral every 6 hours PRN Temp greater or equal to 38C (100.4F), Mild Pain (1 - 3)  bisacodyl Suppository 10 milliGRAM(s) Rectal daily PRN Constipation  dextrose 40% Gel 15 Gram(s) Oral once PRN Blood Glucose LESS THAN 70 milliGRAM(s)/deciliter  glucagon  Injectable 1 milliGRAM(s) IntraMuscular once PRN Glucose LESS THAN 70 milligrams/deciliter  morphine  - Injectable 2 milliGRAM(s) IV Push every 6 hours PRN breakthrough pain  ondansetron Injectable 4 milliGRAM(s) IV Push once PRN Nausea and/or Vomiting  oxyCODONE    IR 5 milliGRAM(s) Oral every 6 hours PRN Severe Pain (7 - 10)  traMADol 25 milliGRAM(s) Oral every 6 hours PRN Moderate Pain (4 - 6)        T(C): 36.8 (06-21-20 @ 13:48), Max: 37.1 (06-20-20 @ 21:29)  HR: 62 (06-21-20 @ 13:48) (52 - 62)  BP: 100/65 (06-21-20 @ 13:48) (100/65 - 125/77)  RR: 18 (06-21-20 @ 13:48) (18 - 18)  SpO2: 97% (06-21-20 @ 13:48) (96% - 98%)  Wt(kg): --    PHYSICAL EXAM:  GENERAL: NAD, well-groomed, well-developed  HEAD:  Atraumatic, Normocephalic  NECK: Supple, No JVD, Normal thyroid  CHEST/LUNG: Clear to percussion bilaterally; No rales, rhonchi, wheezing, or rubs  HEART: Regular rate and rhythm; No murmurs, rubs, or gallops  ABDOMEN: Soft, Nontender, Nondistended; Bowel sounds present  EXTREMITIES: left le foot dsg intact    CAPILLARY BLOOD GLUCOSE      POCT Blood Glucose.: 332 mg/dL (21 Jun 2020 11:52)  POCT Blood Glucose.: 239 mg/dL (21 Jun 2020 08:00)  POCT Blood Glucose.: 316 mg/dL (20 Jun 2020 21:22)  POCT Blood Glucose.: 319 mg/dL (20 Jun 2020 16:53)                            9.3    8.32  )-----------( 370      ( 21 Jun 2020 07:23 )             30.3       CMP:      Thyroid Function Tests:      Diabetes Tests:       Radiology:

## 2024-02-29 NOTE — ANESTHESIA PROCEDURE NOTES
Airway  Date/Time: 2/29/2024 8:22 AM  Urgency: elective    Difficult airway    General Information and Staff    Patient location during procedure: OR  Anesthesiologist: Kevin Sánchez MD  Performed: anesthesiologist   Performed by: Kevin Sánchez MD  Authorized by: Kevin Sánchez MD      Indications and Patient Condition  Indications for airway management: anesthesia  Sedation level: deep  Preoxygenated: yes  Patient position: sniffing  Mask difficulty assessment: 1 - vent by mask    Final Airway Details  Final airway type: endotracheal airway      Successful airway: ETT  Cuffed: yes   Successful intubation technique: Video laryngoscopy  Endotracheal tube insertion site: oral  Blade: GlideScope  Blade size: #3  ETT size (mm): 7.0    Cormack-Lehane Classification: grade IIA - partial view of glottis  Placement verified by: capnometry   Measured from: lips  ETT to lips (cm): 20  Number of attempts at approach: 1

## 2024-03-01 ENCOUNTER — APPOINTMENT (OUTPATIENT)
Dept: GENERAL RADIOLOGY | Facility: HOSPITAL | Age: 39
End: 2024-03-01
Attending: INTERNAL MEDICINE
Payer: MEDICAID

## 2024-03-01 ENCOUNTER — APPOINTMENT (OUTPATIENT)
Dept: GENERAL RADIOLOGY | Facility: HOSPITAL | Age: 39
DRG: 328 | End: 2024-03-01
Attending: INTERNAL MEDICINE
Payer: MEDICAID

## 2024-03-01 PROCEDURE — 99232 SBSQ HOSP IP/OBS MODERATE 35: CPT | Performed by: HOSPITALIST

## 2024-03-01 PROCEDURE — 74220 X-RAY XM ESOPHAGUS 1CNTRST: CPT | Performed by: INTERNAL MEDICINE

## 2024-03-01 RX ORDER — AMLODIPINE BESYLATE 10 MG/1
10 TABLET ORAL DAILY
Status: DISCONTINUED | OUTPATIENT
Start: 2024-03-01 | End: 2024-03-02

## 2024-03-01 RX ORDER — CLONAZEPAM 0.5 MG/1
0.5 TABLET ORAL 2 TIMES DAILY PRN
Status: DISCONTINUED | OUTPATIENT
Start: 2024-03-01 | End: 2024-03-02

## 2024-03-01 RX ORDER — QUETIAPINE FUMARATE 25 MG/1
25 TABLET, FILM COATED ORAL NIGHTLY
Status: DISCONTINUED | OUTPATIENT
Start: 2024-03-01 | End: 2024-03-02

## 2024-03-01 RX ORDER — LOSARTAN POTASSIUM 100 MG/1
100 TABLET ORAL DAILY
Status: DISCONTINUED | OUTPATIENT
Start: 2024-03-01 | End: 2024-03-02

## 2024-03-01 NOTE — PLAN OF CARE
Assumed care around 1930. A/Ox4. On RA w/ sats >90. Monitor shows NSR. Continent of bowel and bladder. Reports abd pain, managed w/ medication. Plan for esophageal x-ray today, poss dc. Safety precautions in place, call light within reach.     Problem: Patient/Family Goals  Goal: Patient/Family Long Term Goal  Description: Patient's Long Term Goal: Back home     Interventions:  - GI to see  - See additional Care Plan goals for specific interventions  Outcome: Progressing  Goal: Patient/Family Short Term Goal  Description: Patient's Short Term Goal: Feel better    Interventions:   - Medication management  - See additional Care Plan goals for specific interventions  Outcome: Progressing     Problem: GASTROINTESTINAL - ADULT  Goal: Minimal or absence of nausea and vomiting  Description: INTERVENTIONS:  - Maintain adequate hydration with IV or PO as ordered and tolerated  - Nasogastric tube to low intermittent suction as ordered  - Evaluate effectiveness of ordered antiemetic medications  - Provide nonpharmacologic comfort measures as appropriate  - Advance diet as tolerated, if ordered  - Obtain nutritional consult as needed  - Evaluate fluid balance  Outcome: Progressing  Goal: Maintains or returns to baseline bowel function  Description: INTERVENTIONS:  - Assess bowel function  - Maintain adequate hydration with IV or PO as ordered and tolerated  - Evaluate effectiveness of GI medications  - Encourage mobilization and activity  - Obtain nutritional consult as needed  - Establish a toileting routine/schedule  - Consider collaborating with pharmacy to review patient's medication profile  Outcome: Progressing  Goal: Maintains adequate nutritional intake (undernourished)  Description: INTERVENTIONS:  - Monitor percentage of each meal consumed  - Identify factors contributing to decreased intake, treat as appropriate  - Assist with meals as needed  - Monitor I&O, WT and lab values  - Obtain nutritional consult as  needed  - Optimize oral hygiene and moisture  - Encourage food from home; allow for food preferences  - Enhance eating environment  Outcome: Progressing  Goal: Achieves appropriate nutritional intake (bariatric)  Description: INTERVENTIONS:  - Monitor for over-consumption  - Identify factors contributing to increased intake, treat as appropriate  - Monitor I&O, WT and lab values  - Obtain nutritional consult as needed  - Evaluate psychosocial factors contributing to over-consumption  Outcome: Progressing     Problem: PAIN - ADULT  Goal: Verbalizes/displays adequate comfort level or patient's stated pain goal  Description: INTERVENTIONS:  - Encourage pt to monitor pain and request assistance  - Assess pain using appropriate pain scale  - Administer analgesics based on type and severity of pain and evaluate response  - Implement non-pharmacological measures as appropriate and evaluate response  - Consider cultural and social influences on pain and pain management  - Manage/alleviate anxiety  - Utilize distraction and/or relaxation techniques  - Monitor for opioid side effects  - Notify MD/LIP if interventions unsuccessful or patient reports new pain  - Anticipate increased pain with activity and pre-medicate as appropriate  Outcome: Progressing

## 2024-03-01 NOTE — PROGRESS NOTES
[facility]     Hospitalist Progress Note     Mago Tovar Patient Status:  Inpatient    3/31/1985 MRN [id number]   Location [facility] 2NE-A Attending Ney Blackmon MD   Hosp Day # 2 PCP None Pcp     Chief Complaint: Dysphagia     Subjective:     Patient still complaining of pain and nausea.  No fevers, no chills, no other acute complaints.    Objective:    Review of Systems:   A comprehensive review of systems was completed; pertinent positive and negatives stated in subjective.    Vital signs:  Temp:  [97.9 °F (36.6 °C)-98.7 °F (37.1 °C)] 97.9 °F (36.6 °C)  Pulse:  [73-87] 87  Resp:  [10-18] 18  BP: (124-159)/() 159/109  SpO2:  [80 %-100 %] 80 %    Physical Exam:    General: No acute distress, pleasant   Respiratory: No wheezes, no rhonchi  Cardiovascular: S1, S2, regular rate and rhythm  Abdomen: Soft, Non-tender, non-distended, positive bowel sounds  Neuro: No new focal deficits.   Extremities: No edema    Diagnostic Data:    Labs:  Recent Labs   Lab 24  0621 24  0602   WBC 7.7 6.5 5.5   HGB 13.6 12.7 12.9   MCV 81.9 82.3 84.8   .0 285.0 274.0   INR  --  0.99  --        Recent Labs   Lab 24  1133 24  0621 24  0602   GLU 99  --  106* 121*   BUN 11  --  11 7*   CREATSERUM 0.91  --  1.00 0.81   CA 9.8  --  8.7 8.4*     --  137 139   K 3.3* 3.7 3.7 3.3*     --  107 106   CO2 27.0  --  27.0 28.0       Estimated Creatinine Clearance: 73 mL/min (based on SCr of 0.81 mg/dL).    Recent Labs   Lab 24   TROPHS 8       Recent Labs   Lab 24  0621   PTP 13.1   INR 0.99                  Microbiology    No results found for this visit on 24.      Imaging: Reviewed in Epic.    Medications:    levofloxacin  500 mg Intravenous Q24H    metRONIDAZOLE  500 mg Intravenous Q8H    enalaprilat  0.625 mg Intravenous Q8H    pantoprazole  40 mg Intravenous Q12H       Assessment & Plan:       #Dysphagia  #Achalasia type II  Patient EGD planned for today but subsequently delayed until tomorrow  EGD performed 2/19  Manometry on 2/21 showing achalasia type II  Balloon dilation and Botox injection on 2/23  Repeat EGD 2/25 with retained oral secretions  G-POEM 2/29  Esphagrogram without leak  Clear liquid diet  Possible discharge later today, will DC IV pain narcotics and try control pain with oral meds only  GI following    #Essential HTN -can tolerate p.o. meds now, will resume home amlodipine and losartan  #Anxiety -resume home Klonopin.  IV Valium        Ney Blackmon MD    Supplementary Documentation:     Quality:  DVT Mechanical Prophylaxis:   SCDs,    DVT Pharmacologic Prophylaxis   Medication   None                Code Status: Not on file  Huggins: No urinary catheter in place  Huggins Duration (in days):   Central line:    NADER: 3/1/2024    Discharge is dependent on: diet, GI   At this point Ms. Tovar is expected to be discharge to: Home    The 21st Century Cures Act makes medical notes like these available to patients in the interest of transparency. Please be advised this is a medical document. Medical documents are intended to carry relevant information, facts as evident, and the clinical opinion of the practitioner. The medical note is intended as peer to peer communication and may appear blunt or direct. It is written in medical language and may contain abbreviations or verbiage that are unfamiliar.

## 2024-03-01 NOTE — DIETARY NOTE
[facility]   part of [facility]    NUTRITION ASSESSMENT    Unable to diagnose malnutrition criteria at this time.    NUTRITION INTERVENTION:    Meal and Snacks - Monitor and encourage adequate PO intake. NPO/Clear Liquid status x 5 days. Diet advancement per GI.  Medical Food Supplements - Ensure Clear BID ; at breakfast and dinner for pt to try.       PATIENT STATUS:     3/1/24- 39 y/o female admitted w/ dysphagia.   Pt recently discharged from hospital on 2/23 after having an esophageal dilation and Botox injection for achalasia. Went home and was able to eat. However, pt w/ difficulty swallowing and emesis after consuming eggs and meds the AM of admit.   Pt chart reviewed d/t NPO/Clear Liquid status x 5 days. Recorded PO intakes vary:0-100% on a Clear Liquid diet. Will add ONS to help maximize nutrition intakes. S/p EGD w/ peroral endoscopic myotomy POEM yesterday (2/29).   Will monitor for diet advancement vs need for nutrition support.  PMH:HTN, achalasia.         ANTHROPOMETRICS:  Ht: 157.5 cm (5' 2\")  Wt: 89.9 kg (198 lb 3.1 oz).   BMI: Body mass index is 36.25 kg/m².  IBW: 50 kg      WEIGHT HISTORY:     Per EMR hx, pt may have lost about 2 lb (1%) in about 1 week, which is significant wt loss per standards.     Wt Readings from Last 10 Encounters:   02/25/24 89.9 kg (198 lb 3.1 oz)   02/19/24 91 kg (200 lb 11.2 oz)   07/30/23 81.6 kg (179 lb 14.3 oz)   06/21/23 81.6 kg (180 lb)   04/15/23 81.6 kg (180 lb)   04/28/22 86.2 kg (190 lb)   12/09/21 97.6 kg (215 lb 4 oz)        NUTRITION:  Diet:       Procedures    Clear liquid diet Is Patient on Accuchecks? No      Food Allergies: No  Cultural/Ethnic/Church Preferences Addressed: Yes    Percent Meals Eaten (last 3 days)       Date/Time Percent Meals Eaten (%)    02/28/24 1920 50 %    02/29/24 1204 0 %     Percent Meals Eaten (%): NPO at 02/29/24 1204    02/29/24 1636 0 %     Percent Meals Eaten (%): npo at 02/29/24 1636    03/01/24 1340 100 %     03/01/24 1641 0 %          GI system review: swallowing dysfunction and nausea Last BM: 2/27.  Skin and wounds: no pressure ulcers per RN documentation    NUTRITION RELATED PHYSICAL FINDINGS:     1. Body Fat/Muscle Mass:  FARIDA at this time     2. Fluid Accumulation: none per RN documentation     NUTRITION PRESCRIPTION: 50 kg (IBW)  Calories: 7863-6731 calories/day (30-35 kcal/kg)  Protein: 100-125 grams protein/day (2-2.5 grams protein per kg)  Fluid: ~1 ml/kcal or per MD discretion    NUTRITION DIAGNOSIS/PROBLEM:  Inadequate energy intake related to  decreased ability to consume sufficient energy intakes  as evidenced by  NPO/Clear Liquid status x 5 days.      MONITOR AND EVALUATE/NUTRITION GOALS:  PO intake of 75% of meals TID - New  PO intake of 75% of oral nutrition supplement/s - New  Return to PO intake or advance diet in 24-48 hrs - New  Start alternative nutrition in 24-48 hrs if diet is not able to advance- New      MEDICATIONS:  IV abx, Dilaudid    LABS:  (2/29):Glu:121, K+:3.3    Pt is at High nutrition risk    Juanita Doty MS, RD, LDN  Clinical Dietitian  Ext:[phone]

## 2024-03-02 VITALS
HEART RATE: 79 BPM | SYSTOLIC BLOOD PRESSURE: 122 MMHG | HEIGHT: 62 IN | TEMPERATURE: 98 F | RESPIRATION RATE: 18 BRPM | DIASTOLIC BLOOD PRESSURE: 97 MMHG | OXYGEN SATURATION: 95 % | WEIGHT: 198.19 LBS | BODY MASS INDEX: 36.47 KG/M2

## 2024-03-02 PROCEDURE — 99239 HOSP IP/OBS DSCHRG MGMT >30: CPT | Performed by: HOSPITALIST

## 2024-03-02 RX ORDER — HYDROCODONE BITARTRATE AND ACETAMINOPHEN 5; 325 MG/1; MG/1
1 TABLET ORAL EVERY 6 HOURS PRN
Status: DISCONTINUED | OUTPATIENT
Start: 2024-03-02 | End: 2024-03-02

## 2024-03-02 RX ORDER — HYDROCODONE BITARTRATE AND ACETAMINOPHEN 5; 325 MG/1; MG/1
1 TABLET ORAL EVERY 6 HOURS PRN
Qty: 20 TABLET | Refills: 0 | Status: SHIPPED | OUTPATIENT
Start: 2024-03-02

## 2024-03-02 RX ORDER — PANTOPRAZOLE SODIUM 40 MG/1
40 TABLET, DELAYED RELEASE ORAL
Qty: 60 TABLET | Refills: 0 | Status: SHIPPED | OUTPATIENT
Start: 2024-03-02 | End: 2024-04-01

## 2024-03-02 NOTE — PLAN OF CARE
Assumed care of patient at 0700; alert and oriented. Bilateral lung sounds clear, diminished; on room air. NSR on monitor. Patient denies chest pain, shortness of breath, dizziness. No issues while ambulating. Active bowel sounds; continent of bowel and bladder. Patient updated on plan of care for discharge; questions answered. Bed in lowest position and call light within reach.     -----------------------------------------------------------

## 2024-03-02 NOTE — PLAN OF CARE
Patient is alert and oriented x4. On room air. Vital signs stable. NSR on tele. Continent of bowel and bladder. C/o 8/10 pain to esophagus/upper abdomen. Receiving prn pain medication. Up with assist in the room. Call light within reach. Plan of care reviewed with patient. All needs met at this time.      Problem: Patient/Family Goals  Goal: Patient/Family Long Term Goal  Description: Patient's Long Term Goal: Back home     Interventions:  - GI to see  - See additional Care Plan goals for specific interventions  Outcome: Progressing  Goal: Patient/Family Short Term Goal  Description: Patient's Short Term Goal: Feel better    Interventions:   - Medication management  - See additional Care Plan goals for specific interventions  Outcome: Progressing     Problem: GASTROINTESTINAL - ADULT  Goal: Minimal or absence of nausea and vomiting  Description: INTERVENTIONS:  - Maintain adequate hydration with IV or PO as ordered and tolerated  - Nasogastric tube to low intermittent suction as ordered  - Evaluate effectiveness of ordered antiemetic medications  - Provide nonpharmacologic comfort measures as appropriate  - Advance diet as tolerated, if ordered  - Obtain nutritional consult as needed  - Evaluate fluid balance  Outcome: Progressing  Goal: Maintains or returns to baseline bowel function  Description: INTERVENTIONS:  - Assess bowel function  - Maintain adequate hydration with IV or PO as ordered and tolerated  - Evaluate effectiveness of GI medications  - Encourage mobilization and activity  - Obtain nutritional consult as needed  - Establish a toileting routine/schedule  - Consider collaborating with pharmacy to review patient's medication profile  Outcome: Progressing  Goal: Maintains adequate nutritional intake (undernourished)  Description: INTERVENTIONS:  - Monitor percentage of each meal consumed  - Identify factors contributing to decreased intake, treat as appropriate  - Assist with meals as needed  - Monitor  I&O, WT and lab values  - Obtain nutritional consult as needed  - Optimize oral hygiene and moisture  - Encourage food from home; allow for food preferences  - Enhance eating environment  Outcome: Progressing  Goal: Achieves appropriate nutritional intake (bariatric)  Description: INTERVENTIONS:  - Monitor for over-consumption  - Identify factors contributing to increased intake, treat as appropriate  - Monitor I&O, WT and lab values  - Obtain nutritional consult as needed  - Evaluate psychosocial factors contributing to over-consumption  Outcome: Progressing     Problem: PAIN - ADULT  Goal: Verbalizes/displays adequate comfort level or patient's stated pain goal  Description: INTERVENTIONS:  - Encourage pt to monitor pain and request assistance  - Assess pain using appropriate pain scale  - Administer analgesics based on type and severity of pain and evaluate response  - Implement non-pharmacological measures as appropriate and evaluate response  - Consider cultural and social influences on pain and pain management  - Manage/alleviate anxiety  - Utilize distraction and/or relaxation techniques  - Monitor for opioid side effects  - Notify MD/LIP if interventions unsuccessful or patient reports new pain  - Anticipate increased pain with activity and pre-medicate as appropriate  Outcome: Progressing

## 2024-03-02 NOTE — DISCHARGE SUMMARY
[place] HOSPITALIST  DISCHARGE SUMMARY     Mago Tovar Patient Status:  Inpatient    3/31/1985 MRN [id number]   Location [facility] 2NE-A Attending Ney Blackmon MD   Hosp Day # 3 PCP None Pcp     Date of Admission: 2024  Date of Discharge:   3/2/2024    Discharge Disposition: Home or Self Care    Discharge Diagnosis:    #Dysphagia  #Achalasia type II  #Essential HTN   #Anxiety     History of Present Illness: Mago Tovar is a 38 year old female with history of achalasia, hypertension, anxiety was just discharged from the hospital yesterday after having esophageal dilation and Botox injection for achalasia.  Patient went home was tolerating food just fine and then that that was able to tolerate her dinner with to bed and then woke up this morning tried some eggs could not keep them down even tried taking her medications with some water and was throwing up.  No hematemesis.  No fevers, chills, diarrhea.    Brief Synopsis:   Patient presented to the hospital with nausea vomiting and abdominal pain.  She had recently been admitted and discharged status post full dilation and Botox injection on .  Patient had repeat EGD on  with which showed retained oral secretions.  She underwent an EGD with a perioral endoscopic myotomy on .  Patient had a postprocedure contrast study which showed no leak.  She was cleared for liquid diet and has a plan with GI about when to advance.  Patient tolerating liquids.  Patient cleared for discharge home.  All questions and concerns were addressed with patient prior to discharge, she is agreeable to plan of care as stated, she is encouraged return to the ER if her symptoms come back or worsen.    Lace+ Score: 49  59-90 High Risk  29-58 Medium Risk  0-28   Low Risk  Patient was referred to the [facility].    TCM Follow-Up Recommendation:  LACE 29-58: Moderate Risk of readmission after discharge from the  hospital.      Procedures during hospitalization:   EGD performed 2/19  Manometry on 2/21 showing achalasia type II  Balloon dilation and Botox injection on 2/23  Repeat EGD 2/25 with retained oral secretions  G-POEM 2/29    Incidental or significant findings and recommendations (brief descriptions):  None    Lab/Test results pending at Discharge:   N/a    Consultants:  GI    Discharge Medication List:     Discharge Medications        START taking these medications        Instructions Prescription details   HYDROcodone-acetaminophen 5-325 MG Tabs  Commonly known as: Norco      Take 1 tablet by mouth every 6 (six) hours as needed.   Quantity: 20 tablet  Refills: 0            CHANGE how you take these medications        Instructions Prescription details   pantoprazole 40 MG Tbec  Commonly known as: Protonix  What changed:   when to take this  additional instructions      Take 1 tablet (40 mg total) by mouth 2 (two) times daily before meals. Take one tablet (40 mg total) by mouth once daily, 30 minutes prior to breakfast.   Stop taking on: April 1, 2024  Quantity: 60 tablet  Refills: 0            CONTINUE taking these medications        Instructions Prescription details   amLODIPine 10 MG Tabs  Commonly known as: Norvasc      Take 1 tablet (10 mg total) by mouth daily.   Stop taking on: May 23, 2024  Quantity: 90 tablet  Refills: 0     clonazePAM 0.5 MG Tabs  Commonly known as: KlonoPIN      Take 1 tablet (0.5 mg total) by mouth 2 (two) times daily as needed for Anxiety.   Quantity: 30 tablet  Refills: 0     losartan 100 MG Tabs  Commonly known as: Cozaar      Take 1 tablet (100 mg total) by mouth daily.   Quantity: 90 tablet  Refills: 0     QUEtiapine 25 MG Tabs  Commonly known as: SEROquel      Take 1 tablet (25 mg total) by mouth nightly.   Quantity: 90 tablet  Refills: 0     sertraline 50 MG Tabs  Commonly known as: Zoloft      Take 1 tablet (50 mg total) by mouth daily.   Quantity: 30 tablet  Refills: 1                Where to Get Your Medications        These medications were sent to [facility] [address] [place] [address] [phone], [phone]  [address] [place] [id number]      Phone: [phone]   HYDROcodone-acetaminophen 5-325 MG Tabs  pantoprazole 40 MG Tbec         [place]    Follow-up appointment:   No follow-up provider specified.  Appointments for Next 30 Days 3/2/2024 - 2024      None            Vital signs:  Temp:  [97.7 °F (36.5 °C)-98.5 °F (36.9 °C)] 97.7 °F (36.5 °C)  Pulse:  [63-89] 63  Resp:  [18-20] 18  BP: (121-159)/() 121/75  SpO2:  [80 %-98 %] 96 %    Physical Exam:    General: No acute distress, pleasant   Lungs: clear to auscultation  Cardiovascular: S1, S2, RRR  Abdomen: Soft, NT, ND    -----------------------------------------------------------------------------------------------  PATIENT DISCHARGE INSTRUCTIONS: See electronic chart    Ney Blackmon MD    Total time spent on discharge plannin minutes     The  Century Cures Act makes medical notes like these available to patients in the interest of transparency. Please be advised this is a medical document. Medical documents are intended to carry relevant information, facts as evident, and the clinical opinion of the practitioner. The medical note is intended as peer to peer communication and may appear blunt or direct. It is written in medical language and may contain abbreviations or verbiage that are unfamiliar.

## 2024-03-02 NOTE — PLAN OF CARE
Patient tele discontinued. IV removed with catheter intact. Patient denies CP, SOB, dizziness, palpations, and calf tenderness. Discharge instructions reviewed with patient, verbalized understanding. Medications and side effects reviewed with patient and sent to pharmacy of choice. Patient escorted via wheelchair to lobby.

## 2024-03-02 NOTE — PROGRESS NOTES
[facility]  Progress Note    Mago Tovar Patient Status:  Observation    3/31/1985 MRN [id number]   Location [facility] 2NE-A Attending Alexx Victoria MD   Date 2024 PCP None Pcp     Subjective:  Mago Tovar is a(n) 38 year old female with dysphagia, persistent symptoms despite Botox and 20 mm balloon dilatation, Achalasia per esophagus x ray and high resolution manometry, post POEM day 1, chest pain improving, tolerating clears, esophagogram today, no leak    Objective:  Blood pressure (!) 145/94, pulse 76, temperature 97.7 °F (36.5 °C), temperature source Oral, resp. rate 18, height 5' 2\" (1.575 m), weight 198 lb 3.1 oz (89.9 kg), SpO2 98%, not currently breastfeeding.    Constitutional: Appearance: well nourished, obese.  Psychiatric: Normal affect, orientation, mood  Eyes: Normal sclera and conjunctiva  Cardiovascular: Normal heart rate.  Ear, nose, mouth and throat: Normal lips  Respiratory: Normal effort.  Abd: soft non tender    Labs:        XRAY: CONCLUSION:  No evidence of esophageal leak.  Probable changes related to underlying esophagitis.  Clinical correlation and follow-up suggested.     Assessment:  Patient Active Problem List   Diagnosis    Anxiety    Crohn's disease (HCC)    Primary hypertension    Esophageal obstruction due to food impaction    Panic attack    Abdominal pain of unknown etiology    Chest pain, atypical    Intractable vomiting    Hypokalemia    Esophageal reflux    Achalasia       Impression:  Achalasia post POEM day 1    Plan:  Clear liquids today  Advance to full liquid diet tomorrow Saturday and remain on full liquid diet x 5 days, then advance to soft as tolerated  OV follow up GI clinic in 1-2 weeks  OK to discharge from GI stand point  Call if any questions    Total time spent in the care of the patient today: 35 minutes    Perico Roa MD MD  [facility]  3/1/2024  7:53 PM

## 2024-03-02 NOTE — PAYOR COMM NOTE
STARTED OUT OBSERVATION ON 2/24  MADE INPT ON 2/28  ADMISSION REVIEW     Payor: [facility]  Subscriber #:  [id number]  Authorization Number: [id number]    Admit date: 2/28/24  Admit time:  7:53 AM       REVIEW DOCUMENTATION:     ED Provider Notes        ED Provider Notes signed by Isaura Dillard DO at 2/25/2024  6:30 AM       Author: Isaura Dillard DO Service: -- Author Type: Physician    Filed: 2/25/2024  6:30 AM Date of Service: 2/24/2024 11:24 PM Status: Signed    : Isaura Dillard DO (Physician)           Patient Seen in: [facility] Emergency Department      History     Chief Complaint   Patient presents with    Swallowing Problem     Stated Complaint: states she just DC'd yesterday, dx Achalasia. Reports difficulty swallowing sxs*    Subjective:   HPI    38-year-old female past medical history of hypertension anxiety and achalasia who presents ED with complaints of difficulty swallowing.  Patient reports that last night she ate mashed potatoes went to sleep woke up in the morning feeling like she regurgitate her food.  Reports in the morning she tried to eat eggs was unable to tolerate them and threw them up as well.  Has been trying to drink fluids tried drinking water to take her Klonopin tonight and was unable to hold it down which is what prompted her to come to the ER.  She is concerned that she may have an obstruction again in her esophagus.  Also complaining of epigastric abdominal pain.  Patient reports that she feels exactly how she presented to the ED 2/19/2024.  Per chart review 2/19/2024 patient endoscopy completed by Dr. Hart who diagnosed her with hiatal hernia esophagitis and gastritis.  She was then referred to Dr. Roa who did EGD with balloon dilatation and Botox injection.    Objective:   Past Medical History:   Diagnosis Date    Anxiety     Esophageal reflux 2/25/2024    Essential hypertension               Past Surgical  History:   Procedure Laterality Date    APPENDECTOMY      REMOVAL GALLBLADDER      TOTAL ABDOM HYSTERECTOMY                  Social History     Socioeconomic History    Marital status: Single   Tobacco Use    Smoking status: Never   Vaping Use    Vaping Use: Never used   Substance and Sexual Activity    Alcohol use: Yes     Comment: social    Drug use: Never     Social Determinants of Health     Food Insecurity: Food Insecurity Present (2/25/2024)    Food Insecurity     Food Insecurity: Sometimes true   Transportation Needs: Unmet Transportation Needs (2/25/2024)    Transportation Needs     Lack of Transportation: Yes   Housing Stability: Low Risk  (2/25/2024)    Housing Stability     Housing Instability: No   Recent Concern: Housing Stability - Medium Risk (2/19/2024)    Housing Stability     Housing Instability: Yes              Review of Systems    Positive for stated complaint: states she just DC'd yesterday, dx Achalasia. Reports difficulty swallowing sxs*  Other systems are as noted in HPI.  Constitutional and vital signs reviewed.      All other systems reviewed and negative except as noted above.    Physical Exam     ED Triage Vitals [02/24/24 2043]   BP (!) 155/104   Pulse 99   Resp 18   Temp 98.5 °F (36.9 °C)   Temp src Oral   SpO2 97 %   O2 Device None (Room air)       Current:BP (!) 145/100 (BP Location: Right arm)   Pulse 78   Temp 98.4 °F (36.9 °C) (Oral)   Resp 14   Ht 157.5 cm (5' 2\")   Wt 89.9 kg   SpO2 95%   BMI 36.25 kg/m²         Physical Exam  Vitals and nursing note reviewed.   Constitutional:       Appearance: She is well-developed.   HENT:      Head: Normocephalic and atraumatic.   Eyes:      Pupils: Pupils are equal, round, and reactive to light.   Cardiovascular:      Rate and Rhythm: Normal rate and regular rhythm.   Pulmonary:      Effort: Pulmonary effort is normal.      Breath sounds: Normal breath sounds.   Abdominal:      General: Bowel sounds are normal.      Palpations:  Abdomen is soft.   Musculoskeletal:         General: No deformity.      Cervical back: Normal range of motion and neck supple.   Skin:     General: Skin is warm and dry.      Capillary Refill: Capillary refill takes less than 2 seconds.   Neurological:      Mental Status: She is alert and oriented to person, place, and time.               ED Course     Labs Reviewed   BASIC METABOLIC PANEL (8) - Abnormal; Notable for the following components:       Result Value    Potassium 3.3 (*)     All other components within normal limits   TROPONIN I HIGH SENSITIVITY - Normal   CBC WITH DIFFERENTIAL WITH PLATELET    Narrative:     The following orders were created for panel order CBC With Differential With Platelet.  Procedure                               Abnormality         Status                     ---------                               -----------         ------                     CBC W/ DIFFERENTIAL[[id number]]                              Final result                 Please view results for these tests on the individual orders.   RAINBOW DRAW LAVENDER   RAINBOW DRAW LIGHT GREEN   RAINBOW DRAW BLUE   CBC W/ DIFFERENTIAL     EKG    Rate, intervals and axes as noted on EKG Report.  Rate: 102  Rhythm: Sinus Rhythm  Reading: no gross st elevations or depressions                 XR CHEST AP PORTABLE  (CPT=71045)    Result Date: 2/25/2024  CONCLUSION:   Stable cardiac and mediastinal contours.  No pulmonary edema or focal airspace consolidation.  The pleural spaces are clear.   LOCATION:  [facility]      Dictated by (CST): Jose Day MD on 2/25/2024 at 0:17 AM     Finalized by (CST): Jose Day MD on 2/25/2024 at 0:17 AM       XR UGI/ESOPHAGUS DOUBLE CONTRAST (CPT=74246)    Result Date: 2/20/2024  CONCLUSION:  There is a small to moderate hiatal hernia present.  There is spontaneous gastroesophageal reflux to the level of the upper thoracic esophagus.  Tertiary contractions within the esophagus suggestive of dysmotility.    LOCATION:  [facility]   Dictated by (CST): Dale Floyd MD on 2/20/2024 at 10:24 AM     Finalized by (CST): Dale Floyd MD on 2/20/2024 at 10:26 AM       CT ABDOMEN+PELVIS(CONTRAST ONLY)(CPT=74177)    Result Date: 2/19/2024  CONCLUSION:  1. Small hiatal hernia with diffuse esophageal wall thickening involving the visualized esophagus may represent reflux esophagitis, correlate clinically.   LOCATION:  [facility]   Dictated by (CST): Lis Willingham MD on 2/19/2024 at 4:35 PM     Finalized by (CST): Lis Willingham MD on 2/19/2024 at 4:39 PM       XR CHEST AP PORTABLE  (CPT=71045)    Result Date: 2/19/2024  CONCLUSION:  Normal examination for a patient of this age.    LOCATION:  [facility]      Dictated by (CST): Odilon Lowry MD on 2/19/2024 at 8:01 AM     Finalized by (CST): Odilon Lowry MD on 2/19/2024 at 8:02 AM              MDM      Is a 38-year-old female presents ED with complaints of nausea and vomiting burning sensation in chest as well as.  Vital signs stable arrival patient appears nontoxic on examination lungs CTA.  EKG sinus no gross ST change significant ischemia troponin negative.  CBC and electrolytes gross within normal limits except for mild hypokalemia given 20 mill equivalents of potassium via IV as patient cannot tolerate p.o. medications at this time.  Patient cannot tolerate solids or liquids at this time will admit with for IV antibiotics and reevaluation by GI.  Admission disposition: 2/24/2024 11:46 PM           Case discussed with Dr. Burnett at 11:46 PM agrees to admission.   consult order for Dr. Roa placed.                             Medical Decision Making      Disposition and Plan     Clinical Impression:  1. Chest pain, atypical    2. Intractable vomiting    3. Hypokalemia         Disposition:  Admit  2/24/2024 11:46 pm    Follow-up:  No follow-up provider specified.        Medications Prescribed:  Current Discharge Medication List                            Hospital Problems       Present on  Admission  Date Reviewed: 1/26/2024            ICD-10-CM Noted POA    * (Principal) Chest pain, atypical R07.89 2/24/2024 Unknown    Achalasia K22.0 2/25/2024 Yes    Anxiety F41.9 12/9/2021 Yes    Esophageal obstruction due to food impaction T18.128A, W44.F3XA 2/19/2024 Yes    Esophageal reflux (Chronic) K21.9 2/25/2024 Yes    Hypokalemia E87.6 2/25/2024 Unknown    Intractable vomiting R11.10 2/25/2024 Yes    Primary hypertension I10 12/9/2021 Yes                     Signed by Isaura Dillard DO on 2/25/2024  6:30 AM         MEDICATIONS ADMINISTERED IN LAST 1 DAY:  amLODIPine (Norvasc) tab 10 mg       Date Action Dose Route User    3/1/2024 1500 Given 10 mg Oral Mary Ralph RN          diazepam (Valium) 5 mg/mL injection 2.5 mg       Date Action Dose Route User    3/1/2024 0511 Given 2.5 mg Intravenous Cassidy Bazan RN    3/1/2024 0105 Given 2.5 mg Intravenous Cassidy Bazan RN    2/29/2024 2105 Given 2.5 mg Intravenous Cassidy Bazan RN          diphenhydrAMINE (Benadryl) 50 mg/mL  injection 12.5 mg       Date Action Dose Route User    2/29/2024 2105 Given 12.5 mg Intravenous Cassidy Bazan RN          enalaprilat (Vasotec) 1.25 mg/mL injection 0.625 mg       Date Action Dose Route User    3/1/2024 1102 Given 0.625 mg Intravenous Mary Ralph RN    3/1/2024 0307 Given 0.625 mg Intravenous Cassidy Bazan RN          HYDROcodone-acetaminophen 7.5-325 MG/15ML solution 15 mL       Date Action Dose Route User    3/1/2024 1500 Given 15 mL Oral Mary Ralph RN          HYDROmorphone (Dilaudid) 1 MG/ML injection 0.8 mg       Date Action Dose Route User    3/1/2024 0659 Given 0.8 mg Intravenous Cassidy Bazan RN    3/1/2024 0512 Given 0.8 mg Intravenous Cassidy Bazan RN    3/1/2024 0307 Given 0.8 mg Intravenous Cassidy Bazan RN    3/1/2024 0105 Given 0.8 mg Intravenous Cassidy Bazan RN    2/29/2024 2304 Given 0.8 mg Intravenous Cassidy Bazan RN    2/29/2024 2105 Given 0.8 mg Intravenous Beni,  KOTA Benjamin          levoFLOXacin in dextrose 5% (Levaquin) 500 mg/100mL IVPB premix 500 mg       Date Action Dose Route User    3/1/2024 1102 New Bag 500 mg Intravenous Mary Ralph RN          losartan (Cozaar) tab 100 mg       Date Action Dose Route User    3/1/2024 1500 Given 100 mg Oral Mary Ralph RN          metRONIDAZOLE in sodium chloride 0.79% (Flagyl) 5 mg/mL IVPB premix 500 mg       Date Action Dose Route User    3/1/2024 1500 New Bag 500 mg Intravenous Mary Ralph RN    3/1/2024 0307 New Bag 500 mg Intravenous Cassidy Bazan RN    2/29/2024 2105 New Bag 500 mg Intravenous Cassidy Bazan RN          pantoprazole (Protonix) 40 mg in sodium chloride 0.9% PF 10 mL IV push       Date Action Dose Route User    3/1/2024 0515 Given 40 mg Intravenous Cassidy Bazan RN          sertraline (Zoloft) tab 50 mg       Date Action Dose Route User    3/1/2024 1500 Given 50 mg Oral Mary Ralph RN          sodium chloride 0.9% infusion       Date Action Dose Route User    3/1/2024 0105 New Bag (none) Intravenous Cassidy Bazan RN            Vitals (last day)       Date/Time Temp Pulse Resp BP SpO2 Weight O2 Device O2 Flow Rate (L/min) [facility]    03/01/24 1641 97.7 °F (36.5 °C) 76 18 145/94 98 % -- None (Room air) 0 L/min NG    03/01/24 1219 -- 87 -- 159/109 80 % -- -- -- MW    03/01/24 0745 97.9 °F (36.6 °C) 79 18 138/108 100 % -- None (Room air) -- CY    03/01/24 0307 98.3 °F (36.8 °C) 73 12 143/84 -- -- None (Room air) -- EE    02/29/24 2310 98.7 °F (37.1 °C) 78 13 158/93 90 % -- None (Room air) -- EE    02/29/24 1636 98.4 °F (36.9 °C) 85 17 124/79 100 % -- Nasal cannula 2 L/min PT    02/29/24 1542 -- 84 12 151/100 97 % -- Nasal cannula 2 L/min AW    02/29/24 1443 -- 85 10 125/76 96 % -- Nasal cannula 2 L/min AW    02/29/24 1343 -- 81 10 155/93 92 % -- Nasal cannula 2 L/min AW    02/29/24 1330 -- 80 11 155/93 99 % -- Nasal cannula 2 L/min AW    02/29/24 1300 -- 85 17 148/88 97 % -- Nasal cannula 2 L/min AW     02/29/24 1242 -- 83 20 131/109 96 % -- Nasal cannula 2 L/min AW    02/29/24 1227 -- 80 12 142/97 99 % -- Nasal cannula 2 L/min AW    02/29/24 1213 -- 80 13 133/93 92 % -- Nasal cannula 2 L/min AW    02/29/24 1204 98.6 °F (37 °C) -- 19 142/100 90 % -- Nasal cannula 2 L/min PT    02/29/24 1130 -- 80 14 137/100 94 % -- -- -- PATTIE    02/29/24 1105 -- 77 12 145/98 100 % -- -- -- PATTIE    02/29/24 1055 -- 80 10 145/98 99 % -- -- -- PATTIE    02/29/24 1040 -- 75 14 130/82 95 % -- -- -- PATTIE    02/29/24 1025 -- 78 12 133/96 96 % -- -- -- PATTIE    02/29/24 1010 -- 75 12 146/108 -- -- -- -- PATTIE    02/29/24 0955 -- 80 10 150/94 92 % -- None (Room air) -- PATTIE    02/29/24 0940 -- 76 12 147/95 100 % -- -- -- PATTIE    02/29/24 0935 -- 80 21 156/103 98 % -- -- -- PATTIE    02/29/24 0930 -- 78 17 147/95 90 % -- -- -- PATTIE    02/29/24 0925 97 °F (36.1 °C) 76 18 163/100 94 % -- Simple mask 10 L/min PATTIE    02/29/24 0923 97 °F (36.1 °C) 76 16 163/100 94 % -- -- -- RAFITA    02/29/24 0741 98.3 °F (36.8 °C) 83 18 145/99 -- -- -- -- PT    02/29/24 0319 97.8 °F (36.6 °C) 73 19 131/95 95 % -- None (Room air) --        2/28 HOSPITALIST NOTE      Subjective:   Patient is still having mid abdominal pain especially worsened with swallowing.  Her pain is currently uncontrolled on her current pain plan.  She denies any fevers or chills, no shortness of breath.  She has no other acute complaints at this time.           Objective:   Review of Systems:   A comprehensive review of systems was completed; pertinent positive and negatives stated in subjective.     Vital signs:  Temp:  [97.9 °F (36.6 °C)-98.7 °F (37.1 °C)] 97.9 °F (36.6 °C)  Pulse:  [62-89] 76  Resp:  [10-20] 18  BP: (118-145)/() 118/77  SpO2:  [92 %-100 %] 92 %    Recent Labs   Lab 02/22/24  0747 02/24/24  2208 02/28/24  0621   WBC 6.6 7.7 6.5   HGB 13.3 13.6 12.7   MCV 79.2* 81.9 82.3   .0 304.0 285.0   INR  --   --  0.99                 Recent Labs   Lab 02/22/24  0747 02/23/24  0837  02/24/24  2208 02/25/24  1133 02/28/24  0621   *  --  99  --  106*   BUN 8*  --  11  --  11   CREATSERUM 0.81  --  0.91  --  1.00   CA 8.7  --  9.8  --  8.7     --  140  --  137   K 3.4*   < > 3.3* 3.7 3.7     --  109  --  107   CO2 23.0  --  27.0  --  27.0       Medications:    enalaprilat  0.625 mg Intravenous Q8H    pantoprazole  40 mg Intravenous Q12H               Assessment & Plan:   #Dysphagia  #Achalasia type II  Patient EGD planned for today but subsequently delayed until tomorrow  EGD performed 2/19  Manometry on 2/21 showing achalasia type II  Balloon dilation and Botox injection on 2/23  Repeat EGD 2/25 with retained oral secretions  Plan for G-POEM tomorrow  Clear liquid diet, pain control, IVF's, PPI  GI following     #Essential HTN - Enalaprilat   #Anxiety - prn valium     2/28 GI NOTE  S: Pt frustrated with ongoing odynophagia and dysphagia. Tolerating sips of clear liquids. No melena or hematochezia   O: /87 (BP Location: Left arm)   Pulse 87   Temp 98.2 °F (36.8 °C) (Oral)   Resp 17   Ht 5' 2\" (1.575 m)   Wt 198 lb 3.1 oz (89.9 kg)   SpO2 96%   BMI 36.25 kg/m²   Gen: AAOx3  CV: RRR with normal S1 / S2  Resp: CTA bilaterally; No increase in respiratory effort   Abd: (+)BS, soft, non-tender, non-distended; no rebound or guarding  Assessment: 38 yr-old female with dysphagia s/p EGD with dilation + Botox without improvement (2/22/24; Dr Roa) with manometry 2/27 revealing Achalasia type II. Plan for EGD with POEM under general in AM  The risks, benefits, alternatives of the procedure including the risks of anesthesia, bleeding, perforation, missed lesions, need for surgery, and infection were discussed with the patient. She expressed understanding of the risks and was agreeable to proceed.  Plan:   Plan for EGD with G-POEM under general in Am with Dr Roa  Clear liquid diet today as able; NPO after midnight  Pain control per Hospitalist recommendations;  antiemetics as needed  BMP, Mg in AM correcting electrolytes as needed     2/29 GI OP NOTE  PREOPERATIVE DIAGNOSIS/INDICATION: Achalasia type II, dysphagia, chest pain  POSTOPERTATIVE DIAGNOSIS: Same  PROCEDURE PERFORMED: EGD with peroral endoscopic myotomy POEM  FINDINGS:  ESOPHAGUS: Mid/distal esophageal vigorous spasm, small amount of fluid residue   EGJ: Tight to the passing of the EGD scope, small hiatal hernia  STOMACH: Normal  DUODENUM: Normal    RECOMMENDATIONS:   Post EGD POEM precautions, watch for bleeding, infection, perforation, adverse drug reactions   NPO until cleared for clears by GI after esophagus Xray  Potential discharge tomorrow  Prophylactic antibiotics  Esophagogram in AM Friday  Continue Antibiotics  Discharge on Levo/flagyl x 7 days  IV PPI's q 12 hrs, Omeprazole 40 mg PO BID upon discharge  Once discharged remain on clears x 24 hrs, then full liquids x 5 days, then soft diet as tolerated  OV follow up GI clinic in 1-2 weeks  2/29 PAIN SERVICES NOTE   Anesthesia Post-op Note     PERORAL ENDOSCOPIC MYOTOMY with clip placement x6     Procedure Summary         Date: 02/29/24 Room / Location:  ENDOSCOPY 02 /  ENDOSCOPY     Anesthesia Start: 0818 Anesthesia Stop: 0923     Procedure: PERORAL ENDOSCOPIC MYOTOMY with clip placement x6 Diagnosis: (achalasia)     Surgeons: Perico Roa MD Anesthesiologist: Kevin Sánchez MD     Anesthesia Type: general ASA Status: 3                Anesthesia Type: general     Vitals Value Taken Time   /100 02/29/24 0923   Temp 97 °F (36.1 °C) 02/29/24 0923   Pulse 76 02/29/24 0923   Resp 16 02/29/24 0923   SpO2 94 % 02/29/24 0923         Patient Location: PACU     Anesthesia Type: general     Airway Patency: patent and extubated     Postop Pain Control: adequate     Mental Status: mildly sedated but able to meaningfully participate in the post-anesthesia evaluation     Nausea/Vomiting: none     Cardiopulmonary/Hydration status: stable euvolemic      Complications: no apparent anesthesia related complications     Postop vital signs: stable     Dental Exam: Unchanged from Preop      2/29 HOSPITAIST NOTE      Subjective:   Patient doing well after her procedure, some pain but controlled.  Denies fever, chills, n/v.  No other acute complaints.             Objective:   Review of Systems:   A comprehensive review of systems was completed; pertinent positive and negatives stated in subjective.     Vital signs:  Temp:  [97.8 °F (36.6 °C)-98.3 °F (36.8 °C)] 98.3 °F (36.8 °C)  Pulse:  [70-92] 83  Resp:  [16-22] 18  BP: (106-148)/() 145/99  SpO2:  [91 %-98 %] 95 %     Physical Exam:    General: No acute distress, pleasant   Respiratory: No wheezes, no rhonchi  Cardiovascular: S1, S2, regular rate and rhythm  Abdomen: Soft, Non-tender, non-distended, positive bowel sounds  Neuro: No new focal deficits.   Extremities: No edema     Diagnostic Data:    Labs:        Recent Labs   Lab 02/24/24 2208 02/28/24 0621 02/29/24  0602   WBC 7.7 6.5 5.5   HGB 13.6 12.7 12.9   MCV 81.9 82.3 84.8   .0 285.0 274.0   INR  --  0.99  --                 Recent Labs   Lab 02/24/24 2208 02/25/24  1133 02/28/24  0621 02/29/24  0602   GLU 99  --  106* 121*   BUN 11  --  11 7*   CREATSERUM 0.91  --  1.00 0.81   CA 9.8  --  8.7 8.4*     --  137 139   K 3.3* 3.7 3.7 3.3*     --  107 106   CO2 27.0  --  27.0 28.0         Medications:    enalaprilat  0.625 mg Intravenous Q8H    pantoprazole  40 mg Intravenous Q12H               Assessment & Plan:   #Dysphagia  #Achalasia type II  Patient EGD planned for today but subsequently delayed until tomorrow  EGD performed 2/19  Manometry on 2/21 showing achalasia type II  Balloon dilation and Botox injection on 2/23  Repeat EGD 2/25 with retained oral secretions  G-POEM 2/29  NPO, IVF's, pain control  Esphagrogram tomorrow, possible discharge tomorrow  GI following     #Essential HTN - Enalaprilat   #Anxiety - prn valium

## 2024-03-04 ENCOUNTER — PATIENT OUTREACH (OUTPATIENT)
Dept: CASE MANAGEMENT | Age: 39
End: 2024-03-04

## 2024-03-04 NOTE — PROGRESS NOTES
NCM attempted to contact the patient for HFU. Pt answered and NCM introduced self and then pt hung up on NCM. NCM will try again another time.

## 2024-03-04 NOTE — PAYOR COMM NOTE
--------------  REQUEST FOR RECONSIDERATION    3/1 CONTINUED STAY REVIEW    Payor: [facility]  Subscriber #:  [id number]  Authorization Number: [id number]    HOSPITALIST:    Patient still complaining of pain and nausea.  No fevers, no chills, no other acute complaints.      Vital signs:  Temp:  [97.9 °F (36.6 °C)-98.7 °F (37.1 °C)] 97.9 °F (36.6 °C)  Pulse:  [73-87] 87  Resp:  [10-18] 18  BP: (124-159)/() 159/109  SpO2:  [80 %-100 %] 80 %     Physical Exam:    General: No acute distress, pleasant   Respiratory: No wheezes, no rhonchi  Cardiovascular: S1, S2, regular rate and rhythm  Abdomen: Soft, Non-tender, non-distended, positive bowel sounds  Neuro: No new focal deficits.   Extremities: No edema     Medications:    levofloxacin  500 mg Intravenous Q24H    metRONIDAZOLE  500 mg Intravenous Q8H    enalaprilat  0.625 mg Intravenous Q8H    pantoprazole  40 mg Intravenous Q12H          Assessment & Plan:   #Dysphagia  #Achalasia type II  Patient EGD planned for today but subsequently delayed until tomorrow  EGD performed 2/19  Manometry on 2/21 showing achalasia type II  Balloon dilation and Botox injection on 2/23  Repeat EGD 2/25 with retained oral secretions  G-POEM 2/29  Esphagrogram without leak  Clear liquid diet  Possible discharge later today, will DC IV pain narcotics and try control pain with oral meds only  GI following     #Essential HTN -can tolerate p.o. meds now, will resume home amlodipine and losartan  #Anxiety -resume home Klonopin.  IV Valium      GI:    Camertia Aurea Guy is a(n) 38 year old female with dysphagia, persistent symptoms despite Botox and 20 mm balloon dilatation, Achalasia per esophagus x ray and high resolution manometry, post POEM day 1, chest pain improving, tolerating clears, esophagogram today, no leak '    Impression:  Achalasia post POEM day 1     Plan:  Clear liquids today  Advance to full liquid diet tomorrow Saturday and remain  on full liquid diet x 5 days, then advance to soft as tolerated

## 2024-03-04 NOTE — PAYOR COMM NOTE
--------------  REQUEST FOR RECONSIDERATION    3/1 CONTINUED STAY REVIEW    Payor: [facility]  Subscriber #:  [id number]  Authorization Number: [id number]    HOSPITALIST:    Patient still complaining of pain and nausea.  No fevers, no chills, no other acute complaints.      Vital signs:  Temp:  [97.9 °F (36.6 °C)-98.7 °F (37.1 °C)] 97.9 °F (36.6 °C)  Pulse:  [73-87] 87  Resp:  [10-18] 18  BP: (124-159)/() 159/109  SpO2:  [80 %-100 %] 80 %     Physical Exam:    General: No acute distress, pleasant   Respiratory: No wheezes, no rhonchi  Cardiovascular: S1, S2, regular rate and rhythm  Abdomen: Soft, Non-tender, non-distended, positive bowel sounds  Neuro: No new focal deficits.   Extremities: No edema     Medications:    levofloxacin  500 mg Intravenous Q24H    metRONIDAZOLE  500 mg Intravenous Q8H    enalaprilat  0.625 mg Intravenous Q8H    pantoprazole  40 mg Intravenous Q12H          Assessment & Plan:   #Dysphagia  #Achalasia type II  Patient EGD planned for today but subsequently delayed until tomorrow  EGD performed 2/19  Manometry on 2/21 showing achalasia type II  Balloon dilation and Botox injection on 2/23  Repeat EGD 2/25 with retained oral secretions  G-POEM 2/29  Esphagrogram without leak  Clear liquid diet  Possible discharge later today, will DC IV pain narcotics and try control pain with oral meds only  GI following     #Essential HTN -can tolerate p.o. meds now, will resume home amlodipine and losartan  #Anxiety -resume home Klonopin.  IV Valium      GI:    Camertia Aurea Guy is a(n) 38 year old female with dysphagia, persistent symptoms despite Botox and 20 mm balloon dilatation, Achalasia per esophagus x ray and high resolution manometry, post POEM day 1, chest pain improving, tolerating clears, esophagogram today, no leak '    Impression:  Achalasia post POEM day 1     Plan:  Clear liquids today  Advance to full liquid diet tomorrow Saturday and remain  on full liquid diet x 5 days, then advance to soft as tolerated

## 2024-03-04 NOTE — PAYOR COMM NOTE
REQUEST FOR RECONSIDERATION    ADMISSION REVIEW     Payor: [facility]  Subscriber #:  [id number]  Authorization Number: [id number]    ADMIT TO INPT STATUS 2/28/24  ADMIT TO OBSERVATION 2/25/24 2/24 Patient Seen in: [facility] Emergency Department    History     Chief Complaint   Patient presents with    Swallowing Problem     Stated Complaint: states she just DC'd yesterday, dx Achalasia. Reports difficulty swallowing sxs*    38-year-old female past medical history of hypertension anxiety and achalasia who presents ED with complaints of difficulty swallowing.  Patient reports that last night she ate mashed potatoes went to sleep woke up in the morning feeling like she regurgitate her food.  Reports in the morning she tried to eat eggs was unable to tolerate them and threw them up as well.  Has been trying to drink fluids tried drinking water to take her Klonopin tonight and was unable to hold it down which is what prompted her to come to the ER.  She is concerned that she may have an obstruction again in her esophagus.  Also complaining of epigastric abdominal pain.  Patient reports that she feels exactly how she presented to the ED 2/19/2024.  Per chart review 2/19/2024 patient endoscopy completed by Dr. Hart who diagnosed her with hiatal hernia esophagitis and gastritis.  She was then referred to Dr. Roa who did EGD with balloon dilatation and Botox injection.    Objective:   Past Medical History:   Diagnosis Date    Anxiety     Esophageal reflux 2/25/2024    Essential hypertension      Past Surgical History:   Procedure Laterality Date    APPENDECTOMY      REMOVAL GALLBLADDER      TOTAL ABDOM HYSTERECTOMY         Physical Exam     ED Triage Vitals [02/24/24 2043]   BP (!) 155/104   Pulse 99   Resp 18   Temp 98.5 °F (36.9 °C)   Temp src Oral   SpO2 97 %   O2 Device None (Room air)     Current:BP (!) 145/100 (BP Location: Right arm)   Pulse 78   Temp 98.4 °F (36.9  °C) (Oral)   Resp 14   Ht 157.5 cm (5' 2\")   Wt 89.9 kg   SpO2 95%   BMI 36.25 kg/m²     Labs Reviewed   BASIC METABOLIC PANEL (8) - Abnormal; Notable for the following components:       Result Value    Potassium 3.3 (*)     All other components within normal limits   TROPONIN I HIGH SENSITIVITY - Normal   CBC WITH DIFFERENTIAL WITH PLATELET      Sinus Rhythm  Reading: no gross st elevations or depressions       XR CHEST AP PORTABLE     Stable cardiac and mediastinal contours.  No pulmonary edema or focal airspace consolidation.  The pleural spaces are clear.    PARKER      Is a 38-year-old female presents ED with complaints of nausea and vomiting burning sensation in chest as well as.  Vital signs stable arrival patient appears nontoxic on examination lungs CTA.  EKG sinus no gross ST change significant ischemia troponin negative.  CBC and electrolytes gross within normal limits except for mild hypokalemia given 20 mill equivalents of potassium via IV as patient cannot tolerate p.o. medications at this time.  Patient cannot tolerate solids or liquids at this time will admit with for IV antibiotics and reevaluation by GI.       Disposition and Plan     Clinical Impression:  1. Chest pain, atypical    2. Intractable vomiting    3. Hypokalemia       Hospital Problems       Present on Admission  Date Reviewed: 1/26/2024            ICD-10-CM Noted POA    * (Principal) Chest pain, atypical R07.89 2/24/2024 Unknown    Achalasia K22.0 2/25/2024 Yes    Anxiety F41.9 12/9/2021 Yes    Esophageal obstruction due to food impaction T18.128A, W44.F3XA 2/19/2024 Yes    Esophageal reflux (Chronic) K21.9 2/25/2024 Yes    Hypokalemia E87.6 2/25/2024 Unknown    Intractable vomiting R11.10 2/25/2024 Yes    Primary hypertension I10 12/9/2021 Yes               2/28 HOSPITALIST NOTE    Patient is still having mid abdominal pain especially worsened with swallowing.  Her pain is currently uncontrolled on her current pain plan.  She  denies any fevers or chills, no shortness of breath.  She has no other acute complaints at this time.      Vital signs:  Temp:  [97.9 °F (36.6 °C)-98.7 °F (37.1 °C)] 97.9 °F (36.6 °C)  Pulse:  [62-89] 76  Resp:  [10-20] 18  BP: (118-145)/() 118/77  SpO2:  [92 %-100 %] 92 %    Recent Labs   Lab 02/22/24  0747 02/24/24 2208 02/28/24  0621   WBC 6.6 7.7 6.5   HGB 13.3 13.6 12.7   MCV 79.2* 81.9 82.3   .0 304.0 285.0   INR  --   --  0.99      Lab 02/22/24  0747 02/23/24  0837 02/24/24 2208 02/25/24  1133 02/28/24  0621   *  --  99  --  106*   BUN 8*  --  11  --  11   CREATSERUM 0.81  --  0.91  --  1.00   CA 8.7  --  9.8  --  8.7     --  140  --  137   K 3.4*   < > 3.3* 3.7 3.7     --  109  --  107   CO2 23.0  --  27.0  --  27.0       Medications:    enalaprilat  0.625 mg Intravenous Q8H    pantoprazole  40 mg Intravenous Q12H          Assessment & Plan:   #Dysphagia  #Achalasia type II  Patient EGD planned for today but subsequently delayed until tomorrow  EGD performed 2/19  Manometry on 2/21 showing achalasia type II  Balloon dilation and Botox injection on 2/23  Repeat EGD 2/25 with retained oral secretions  Plan for G-POEM tomorrow  Clear liquid diet, pain control, IVF's, PPI  GI following     #Essential HTN - Enalaprilat   #Anxiety - prn valium     2/28 GI NOTE  S: Pt frustrated with ongoing odynophagia and dysphagia. Tolerating sips of clear liquids. No melena or hematochezia   O: /87 (BP Location: Left arm)   Pulse 87   Temp 98.2 °F (36.8 °C) (Oral)   Resp 17   Ht 5' 2\" (1.575 m)   Wt 198 lb 3.1 oz (89.9 kg)   SpO2 96%   BMI 36.25 kg/m²   Gen: AAOx3  CV: RRR with normal S1 / S2  Resp: CTA bilaterally; No increase in respiratory effort   Abd: (+)BS, soft, non-tender, non-distended; no rebound or guarding  Assessment: 38 yr-old female with dysphagia s/p EGD with dilation + Botox without improvement (2/22/24; Dr Roa) with manometry 2/27 revealing Achalasia type II.  Plan for EGD with POEM under general in AM  The risks, benefits, alternatives of the procedure including the risks of anesthesia, bleeding, perforation, missed lesions, need for surgery, and infection were discussed with the patient. She expressed understanding of the risks and was agreeable to proceed.  Plan:   Plan for EGD with G-POEM under general in Am with Dr Roa  Clear liquid diet today as able; NPO after midnight  Pain control per Hospitalist recommendations; antiemetics as needed  BMP, Mg in AM correcting electrolytes as needed     2/29 GI OP NOTE  PREOPERATIVE DIAGNOSIS/INDICATION: Achalasia type II, dysphagia, chest pain  POSTOPERTATIVE DIAGNOSIS: Same  PROCEDURE PERFORMED: EGD with peroral endoscopic myotomy POEM  FINDINGS:  ESOPHAGUS: Mid/distal esophageal vigorous spasm, small amount of fluid residue   EGJ: Tight to the passing of the EGD scope, small hiatal hernia  STOMACH: Normal  DUODENUM: Normal    RECOMMENDATIONS:   Post EGD POEM precautions, watch for bleeding, infection, perforation, adverse drug reactions   NPO until cleared for clears by GI after esophagus Xray  Potential discharge tomorrow  Prophylactic antibiotics  Esophagogram in AM Friday  Continue Antibiotics  Discharge on Levo/flagyl x 7 days  IV PPI's q 12 hrs, Omeprazole 40 mg PO BID upon discharge  Once discharged remain on clears x 24 hrs, then full liquids x 5 days, then soft diet as tolerated  OV follow up GI clinic in 1-2 weeks  2/29 PAIN SERVICES NOTE   Anesthesia Post-op Note     PERORAL ENDOSCOPIC MYOTOMY with clip placement x6     Procedure Summary         Date: 02/29/24 Room / Location:  ENDOSCOPY 02 /  ENDOSCOPY     Anesthesia Start: 0818 Anesthesia Stop: 0923     Procedure: PERORAL ENDOSCOPIC MYOTOMY with clip placement x6 Diagnosis: (achalasia)     Surgeons: Perico Roa MD Anesthesiologist: Kevin Sánchez MD     Anesthesia Type: general ASA Status: 3                    2/29:    HOSPITALIST:    Patient  doing well after her procedure, some pain but controlled.  Denies fever, chills, n/v.  No other acute complaints.        Vital signs:  Temp:  [97.8 °F (36.6 °C)-98.3 °F (36.8 °C)] 98.3 °F (36.8 °C)  Pulse:  [70-92] 83  Resp:  [16-22] 18  BP: (106-148)/() 145/99  SpO2:  [91 %-98 %] 95 %     Physical Exam:    General: No acute distress, pleasant   Respiratory: No wheezes, no rhonchi  Cardiovascular: S1, S2, regular rate and rhythm  Abdomen: Soft, Non-tender, non-distended, positive bowel sounds  Neuro: No new focal deficits.   Extremities: No edema     Lab 02/24/24 2208 02/28/24 0621 02/29/24  0602   WBC 7.7 6.5 5.5   HGB 13.6 12.7 12.9   MCV 81.9 82.3 84.8   .0 285.0 274.0   INR  --  0.99  --       Lab 02/24/24 2208 02/25/24  1133 02/28/24  0621 02/29/24  0602   GLU 99  --  106* 121*   BUN 11  --  11 7*   CREATSERUM 0.91  --  1.00 0.81   CA 9.8  --  8.7 8.4*     --  137 139   K 3.3* 3.7 3.7 3.3*     --  107 106   CO2 27.0  --  27.0 28.0         Medications:    enalaprilat  0.625 mg Intravenous Q8H    pantoprazole  40 mg Intravenous Q12H       Assessment & Plan:   #Dysphagia  #Achalasia type II  Patient EGD planned for today but subsequently delayed until tomorrow  EGD performed 2/19  Manometry on 2/21 showing achalasia type II  Balloon dilation and Botox injection on 2/23  Repeat EGD 2/25 with retained oral secretions  G-POEM 2/29  NPO, IVF's, pain control  Esphagrogram tomorrow, possible discharge tomorrow  GI following     #Essential HTN - Enalaprilat   #Anxiety - prn valium        MEDICATIONS ADMINISTERED IN LAST 1 DAY:  amLODIPine (Norvasc) tab 10 mg       Date Action Dose Route User    3/1/2024 1500 Given 10 mg Oral Mary Ralph, RN          diazepam (Valium) 5 mg/mL injection 2.5 mg       Date Action Dose Route User    3/1/2024 0511 Given 2.5 mg Intravenous Cassidy Bazan, KOTA    3/1/2024 0105 Given 2.5 mg Intravenous Cassidy Bazan RN    2/29/2024 2105 Given 2.5 mg Intravenous  Beni, Cassidy, RN          diphenhydrAMINE (Benadryl) 50 mg/mL  injection 12.5 mg       Date Action Dose Route User    2/29/2024 2105 Given 12.5 mg Intravenous Cassidy Bazan RN          enalaprilat (Vasotec) 1.25 mg/mL injection 0.625 mg       Date Action Dose Route User    3/1/2024 1102 Given 0.625 mg Intravenous Mary Ralph RN    3/1/2024 0307 Given 0.625 mg Intravenous Cassidy Bazan RN          HYDROcodone-acetaminophen 7.5-325 MG/15ML solution 15 mL       Date Action Dose Route User    3/1/2024 1500 Given 15 mL Oral Mary Ralph RN          HYDROmorphone (Dilaudid) 1 MG/ML injection 0.8 mg       Date Action Dose Route User    3/1/2024 0659 Given 0.8 mg Intravenous Cassidy Bazan RN    3/1/2024 0512 Given 0.8 mg Intravenous Cassidy Bazan RN    3/1/2024 0307 Given 0.8 mg Intravenous Cassidy Bazan RN    3/1/2024 0105 Given 0.8 mg Intravenous Cassidy Bazan RN    2/29/2024 2304 Given 0.8 mg Intravenous Cassidy Bazan RN    2/29/2024 2105 Given 0.8 mg Intravenous Cassidy Bazan RN          levoFLOXacin in dextrose 5% (Levaquin) 500 mg/100mL IVPB premix 500 mg       Date Action Dose Route User    3/1/2024 1102 New Bag 500 mg Intravenous Mary Ralph RN          metRONIDAZOLE in sodium chloride 0.79% (Flagyl) 5 mg/mL IVPB premix 500 mg       Date Action Dose Route User    3/1/2024 1500 New Bag 500 mg Intravenous Mary Ralph RN    3/1/2024 0307 New Bag 500 mg Intravenous Cassidy Bazan RN    2/29/2024 2105 New Bag 500 mg Intravenous Cassidy Bazan RN          pantoprazole (Protonix) 40 mg in sodium chloride 0.9% PF 10 mL IV push       Date Action Dose Route User    3/1/2024 0515 Given 40 mg Intravenous Cassidy Bazan RN          sodium chloride 0.9% infusion       Date Action Dose Route User    3/1/2024 0105 New Bag (none) Intravenous Cassidy Bazan, RN            Vitals (last day)       Date/Time Temp Pulse Resp BP SpO2 Weight O2 Device O2 Flow Rate (L/min) [facility]    03/01/24 1641 97.7 °F (36.5  °C) 76 18 145/94 98 % -- None (Room air) 0 L/min NG    03/01/24 1219 -- 87 -- 159/109 80 % -- -- -- MW    03/01/24 0745 97.9 °F (36.6 °C) 79 18 138/108 100 % -- None (Room air) -- CY    03/01/24 0307 98.3 °F (36.8 °C) 73 12 143/84 -- -- None (Room air) -- EE    02/29/24 2310 98.7 °F (37.1 °C) 78 13 158/93 90 % -- None (Room air) -- EE    02/29/24 1636 98.4 °F (36.9 °C) 85 17 124/79 100 % -- Nasal cannula 2 L/min PT    02/29/24 1542 -- 84 12 151/100 97 % -- Nasal cannula 2 L/min AW    02/29/24 1443 -- 85 10 125/76 96 % -- Nasal cannula 2 L/min AW    02/29/24 1343 -- 81 10 155/93 92 % -- Nasal cannula 2 L/min AW    02/29/24 1330 -- 80 11 155/93 99 % -- Nasal cannula 2 L/min AW    02/29/24 1300 -- 85 17 148/88 97 % -- Nasal cannula 2 L/min AW    02/29/24 1242 -- 83 20 131/109 96 % -- Nasal cannula 2 L/min AW    02/29/24 1227 -- 80 12 142/97 99 % -- Nasal cannula 2 L/min AW    02/29/24 1213 -- 80 13 133/93 92 % -- Nasal cannula 2 L/min AW    02/29/24 1204 98.6 °F (37 °C) -- 19 142/100 90 % -- Nasal cannula 2 L/min PT    02/29/24 1130 -- 80 14 137/100 94 % -- -- -- PATTIE    02/29/24 1105 -- 77 12 145/98 100 % -- -- -- PATTIE    02/29/24 1055 -- 80 10 145/98 99 % -- -- -- PATTIE    02/29/24 1040 -- 75 14 130/82 95 % -- -- -- PATITE    02/29/24 1025 -- 78 12 133/96 96 % -- -- -- PATTIE    02/29/24 1010 -- 75 12 146/108 -- -- -- -- PATTIE    02/29/24 0955 -- 80 10 150/94 92 % -- None (Room air) -- PATTIE    02/29/24 0940 -- 76 12 147/95 100 % -- -- -- PATTIE    02/29/24 0935 -- 80 21 156/103 98 % -- -- -- PATTIE    02/29/24 0930 -- 78 17 147/95 90 % -- -- -- PATTIE    02/29/24 0925 97 °F (36.1 °C) 76 18 163/100 94 % -- Simple mask 10 L/min PATTIE    02/29/24 0923 97 °F (36.1 °C) 76 16 163/100 94 % -- -- -- RAFITA    02/29/24 0741 98.3 °F (36.8 °C) 83 18 145/99 -- -- -- -- PT    02/29/24 0319 97.8 °F (36.6 °C) 73 19 131/95 95 % -- None (Room air) -- EE

## 2024-03-05 ENCOUNTER — PATIENT OUTREACH (OUTPATIENT)
Dept: CASE MANAGEMENT | Age: 39
End: 2024-03-05

## 2024-03-05 NOTE — PAYOR COMM NOTE
--------------  DISCHARGE REVIEW    Payor: [facility]  Subscriber #:  [id number]  Authorization Number: [id number]    Admit date: 24  Admit time:   7:53 AM  Discharge Date: 3/2/2024  2:22 PM       [facility]     Mago Tovar Patient Status:  Inpatient    3/31/1985 MRN [id number]   Location [facility] 2NE-A Attending Ney Blackmon MD   Hosp Day # 3 PCP None Pcp     Date of Admission: 2024  Date of Discharge:   3/2/2024    Discharge Disposition: Home or Self Care    Discharge Diagnosis:    #Dysphagia  #Achalasia type II  #Essential HTN   #Anxiety     History of Present Illness: Mago Tovar is a 38 year old female with history of achalasia, hypertension, anxiety was just discharged from the hospital yesterday after having esophageal dilation and Botox injection for achalasia.  Patient went home was tolerating food just fine and then that that was able to tolerate her dinner with to bed and then woke up this morning tried some eggs could not keep them down even tried taking her medications with some water and was throwing up.  No hematemesis.  No fevers, chills, diarrhea.    Brief Synopsis:   Patient presented to the hospital with nausea vomiting and abdominal pain.  She had recently been admitted and discharged status post full dilation and Botox injection on .  Patient had repeat EGD on  with which showed retained oral secretions.  She underwent an EGD with a perioral endoscopic myotomy on .  Patient had a postprocedure contrast study which showed no leak.  She was cleared for liquid diet and has a plan with GI about when to advance.  Patient tolerating liquids.  Patient cleared for discharge home.  All questions and concerns were addressed with patient prior to discharge, she is agreeable to plan of care as stated, she is encouraged return to the ER if her symptoms come back or worsen.    Lace+ Score:  49  59-90 High Risk  29-58 Medium Risk  0-28   Low Risk  Patient was referred to the [facility].    TCM Follow-Up Recommendation:  LACE 29-58: Moderate Risk of readmission after discharge from the hospital.      Procedures during hospitalization:   EGD performed 2/19  Manometry on 2/21 showing achalasia type II  Balloon dilation and Botox injection on 2/23  Repeat EGD 2/25 with retained oral secretions  G-POEM 2/29    Incidental or significant findings and recommendations (brief descriptions):  None    Lab/Test results pending at Discharge:   N/a    Consultants:  GI    Discharge Medication List:     Discharge Medications        START taking these medications        Instructions Prescription details   HYDROcodone-acetaminophen 5-325 MG Tabs  Commonly known as: Norco      Take 1 tablet by mouth every 6 (six) hours as needed.   Quantity: 20 tablet  Refills: 0            CHANGE how you take these medications        Instructions Prescription details   pantoprazole 40 MG Tbec  Commonly known as: Protonix  What changed:   when to take this  additional instructions      Take 1 tablet (40 mg total) by mouth 2 (two) times daily before meals. Take one tablet (40 mg total) by mouth once daily, 30 minutes prior to breakfast.   Stop taking on: April 1, 2024  Quantity: 60 tablet  Refills: 0            CONTINUE taking these medications        Instructions Prescription details   amLODIPine 10 MG Tabs  Commonly known as: Norvasc      Take 1 tablet (10 mg total) by mouth daily.   Stop taking on: May 23, 2024  Quantity: 90 tablet  Refills: 0     clonazePAM 0.5 MG Tabs  Commonly known as: KlonoPIN      Take 1 tablet (0.5 mg total) by mouth 2 (two) times daily as needed for Anxiety.   Quantity: 30 tablet  Refills: 0     losartan 100 MG Tabs  Commonly known as: Cozaar      Take 1 tablet (100 mg total) by mouth daily.   Quantity: 90 tablet  Refills: 0     QUEtiapine 25 MG Tabs  Commonly known as: SEROquel      Take 1  tablet (25 mg total) by mouth nightly.   Quantity: 90 tablet  Refills: 0     sertraline 50 MG Tabs  Commonly known as: Zoloft      Take 1 tablet (50 mg total) by mouth daily.   Quantity: 30 tablet  Refills: 1               Where to Get Your Medications        These medications were sent to [facility] [address] [place] [address] [phone], [phone]  [address] [place] [id number]      Phone: [phone]   HYDROcodone-acetaminophen 5-325 MG Tabs  pantoprazole 40 MG Tbec         [place]    Follow-up appointment:   No follow-up provider specified.  Appointments for Next 30 Days 3/2/2024 - 2024      None            Vital signs:  Temp:  [97.7 °F (36.5 °C)-98.5 °F (36.9 °C)] 97.7 °F (36.5 °C)  Pulse:  [63-89] 63  Resp:  [18-20] 18  BP: (121-159)/() 121/75  SpO2:  [80 %-98 %] 96 %    Physical Exam:    General: No acute distress, pleasant   Lungs: clear to auscultation  Cardiovascular: S1, S2, RRR  Abdomen: Soft, NT, ND    -----------------------------------------------------------------------------------------------  PATIENT DISCHARGE INSTRUCTIONS: See electronic chart    Ney Blackmon MD    Total time spent on discharge plannin minutes     The  [employer] Cures Act makes medical notes like these available to patients in the interest of transparency. Please be advised this is a medical document. Medical documents are intended to carry relevant information, facts as evident, and the clinical opinion of the practitioner. The medical note is intended as peer to peer communication and may appear blunt or direct. It is written in medical language and may contain abbreviations or verbiage that are unfamiliar.       Electronically signed by Ney Blackmon MD on 3/2/2024 12:22 PM         REVIEWER COMMENTS

## 2024-03-05 NOTE — PROGRESS NOTES
Hospital follow up.    TCC request.    Patient declined, states she \"already has one\".  Confirmed with patient.    Closing encounter.

## 2024-03-16 ENCOUNTER — TELEMEDICINE (OUTPATIENT)
Dept: TELEHEALTH | Age: 39
End: 2024-03-16
Payer: MEDICAID

## 2024-03-16 DIAGNOSIS — L29.9 PRURITUS: Primary | ICD-10-CM

## 2024-03-16 DIAGNOSIS — F41.9 ANXIETY: ICD-10-CM

## 2024-03-16 PROCEDURE — 99213 OFFICE O/P EST LOW 20 MIN: CPT | Performed by: NURSE PRACTITIONER

## 2024-03-16 RX ORDER — HYDROXYZINE HYDROCHLORIDE 25 MG/1
25 TABLET, FILM COATED ORAL 3 TIMES DAILY PRN
Qty: 21 TABLET | Refills: 0 | Status: SHIPPED | OUTPATIENT
Start: 2024-03-16 | End: 2024-03-23

## 2024-03-16 NOTE — PROGRESS NOTES
Virtual/Telephone Check-In    Mago Tovar verbally consents to a Virtual/Telephone Check-In service on 03/16/24.  Patient has been referred to the [facility] website at [email or web address] to review the yearly Consent to Treat document.  Patient understands and accepts financial responsibility for any deductible, co-insurance and/or co-pays associated with this service.       Telehealth Verbal Consent   I conducted a telehealth visit with Mago Tovar today, 03/16/24, which was completed using two-way, real-time interactive audio and video communication. This has been done in good joselyn to provide continuity of care in the best interest of the provider-patient relationship, due to the COVID -19 public health crisis/national emergency where restrictions of face-to-face office visits are ongoing. Every conscious effort was taken to allow for sufficient and adequate time to complete the visit.  The patient was made aware of the limitations of the telehealth visit, including treatment limitations as no physical exam could be performed.  The patient was advised to call 911 or to go to the ER in case there was an emergency.  The patient was also advised of the potential privacy & security concerns related to the telehealth platform.   The patient was made aware of where to find [facility]'s notice of privacy practices, telehealth consent form and other related consent forms and documents.  which are located on the [facility] website. The patient verbally agreed to telehealth consent form, related consents and the risks discussed.    Lastly, the patient confirmed that they were in [place].   Included in this visit, time may have been spent reviewing labs, medications, radiology tests and decision making. Appropriate medical decision-making and tests are ordered as detailed in the plan of care above.  Coding/billing information is submitted for this visit based on complexity of care and/or time spent for the  visit.    CHIEF COMPLAINT:     Chief Complaint   Patient presents with    Anxiety    Itching       HPI:   Mago Tovar is a 38 year old female who presents for a video visit.  Patient reports anxiety and anxiety induced itching.  Pt reports she has experienced anxiety and itching in the past.  Reports was hospitalized 2/19/-2/23/24 and 2/24-3/2/24 at  for achalasia.  Reports her anxiety worsened while she was sick.  She was on klonopin and getting some relief but ran out.   Reports anxiety has continued to worsen.  When she has a panic attack she \"itches like crazy\".  Denies rash.  Panic attacks happening more frequently; last was last night.  Reports during panic attack she gets racing of her heart, tingling in her fingers, and hyperventilates.    Reports difficulty with follow up care with psych and getting disability/ERICKA paperwork.   Having difficulty finding PCP in network with [facility] health plan.   Pt was referred to [facility] at discharge but declined.       Current Outpatient Medications   Medication Sig Dispense Refill    pantoprazole 40 MG Oral Tab EC Take 1 tablet (40 mg total) by mouth 2 (two) times daily before meals. Take one tablet (40 mg total) by mouth once daily, 30 minutes prior to breakfast. 60 tablet 0    amLODIPine 10 MG Oral Tab Take 1 tablet (10 mg total) by mouth daily. 90 tablet 0    losartan 100 MG Oral Tab Take 1 tablet (100 mg total) by mouth daily. 90 tablet 0    QUEtiapine 25 MG Oral Tab Take 1 tablet (25 mg total) by mouth nightly. 90 tablet 0    HYDROcodone-acetaminophen 5-325 MG Oral Tab Take 1 tablet by mouth every 6 (six) hours as needed. (Patient not taking: Reported on 3/16/2024) 20 tablet 0    clonazePAM 0.5 MG Oral Tab Take 1 tablet (0.5 mg total) by mouth 2 (two) times daily as needed for Anxiety. 30 tablet 0    sertraline 50 MG Oral Tab Take 1 tablet (50 mg total) by mouth daily. (Patient not taking: Reported on 3/16/2024) 30 tablet 1      Past Medical  History:   Diagnosis Date    Anxiety     Esophageal reflux 2/25/2024    Essential hypertension       Past Surgical History:   Procedure Laterality Date    APPENDECTOMY      REMOVAL GALLBLADDER      TOTAL ABDOM HYSTERECTOMY           Social History     Socioeconomic History    Marital status: Single   Tobacco Use    Smoking status: Never   Vaping Use    Vaping Use: Never used   Substance and Sexual Activity    Alcohol use: Yes     Comment: social    Drug use: Never     Social Determinants of Health     Food Insecurity: Food Insecurity Present (2/25/2024)    Food Insecurity     Food Insecurity: Sometimes true   Transportation Needs: Unmet Transportation Needs (2/25/2024)    Transportation Needs     Lack of Transportation: Yes   Housing Stability: Low Risk  (2/25/2024)    Housing Stability     Housing Instability: No   Recent Concern: Housing Stability - Medium Risk (2/19/2024)    Housing Stability     Housing Instability: Yes         REVIEW OF SYSTEMS:   GENERAL: fair appetite  SKIN: no rashes or abnormal skin lesions  HEENT: See HPI  LUNGS: denies shortness of breath or wheezing, See HPI  CARDIOVASCULAR: denies chest pain; see HPI   GI: denies N/V/C or abdominal pain  NEURO: Denies headaches    EXAM:   General: Alert, Well-appearing, and In no acute distress  Respiratory:   Speaking in full sentences comfortably  Normal work of breathing  No cough during visit  Head: Normocephalic  Eyes: Conjunctiva clear  Skin: No obvious rashes or lesions from what observed.   Mood: Affect appropriate    ASSESSMENT AND PLAN:   Mago Tovar is a 38 year old female who presents with symptoms that are consistent with    ASSESSMENT/PLAN:     Diagnoses and all orders for this visit:    Pruritus  -     hydrOXYzine 25 MG Oral Tab; Take 1 tablet (25 mg total) by mouth 3 (three) times daily as needed for Itching.    Anxiety      Recommended pt go to ED for treatment of anxiety; possible refill on clonazepam  Pt does not want  to go to ED now.  Reports has been treated with hydroxyzine in the past for the itching and it helped her anxiety.   Requesting script for hydroxyzine.   Discussed use, dose, and possible side effects.   Recommended to establish care with PCP to assist with ERICKA/STD paperwork and psych referral.  Advised to call insurance company for names of in-network PCPs.   Visit abruptly ended.  Attempted to reconnect without success.   Attempted to reach pt by phone; reached .  Advised provider would remain on visit so pt can reconnect if interested.  No connection made.    See pt instructions      Patient Instructions   Go to the emergency room for management of your anxiety  Contact your insurance company for names of in-network PCPs in your area  Hydroxyzine for itching as prescribed    The patient indicates understanding of these issues and agrees to the plan.    Face to face time spent on Video Visit: 16:50 min  Total Time spent on visit including reviewing history, ordering labs/medication, patient examination and education: 21 min

## 2024-03-16 NOTE — PATIENT INSTRUCTIONS
Go to the emergency room for management of your anxiety  Contact your insurance company for names of in-network PCPs in your area  Hydroxyzine for itching as prescribed

## 2024-03-27 ENCOUNTER — TELEPHONE (OUTPATIENT)
Dept: ADMINISTRATIVE | Age: 39
End: 2024-03-27

## 2024-03-27 NOTE — TELEPHONE ENCOUNTER
Pt has pending [employer]/Disab forms to be completed. Pt is frustrated the hospital MD did not complete these as she was promised by a nurse. Pt has never seen any [facility] physicians, as to the reason Forms Dept cannot complete this request. Called Dr Roa's office at [phone], was transferred to Dr Roa's MA ext. LM requesting a call back to find a way to assist the pt with completing her forms as Dr Roa treated the pt while she was admitted.   Called pt to inform I reached out to Dr. Roa's office and will await a call back. Pt was very appreciative and states she is \"desperate\" at this point due to not having any income without having these forms completed. Pt states her deadline is 4/6/24.

## 2024-03-27 NOTE — TELEPHONE ENCOUNTER
Olivia from Dr Roa's office called back, after discussing pt's situation forms were faxed to Dr Roa's office for review. Called pt to inform her forms will be forwarded to Dr Roa's office but with no guarantee they will be completed as it is up to the doctors discretion. Pt will email forms to Forms Dept, once receiving forms will be faxed to [phone], attnatalia Baker

## 2024-04-29 ENCOUNTER — TELEMEDICINE (OUTPATIENT)
Dept: TELEHEALTH | Age: 39
End: 2024-04-29

## 2024-04-29 DIAGNOSIS — L29.9 ITCHING: Primary | ICD-10-CM

## 2024-04-29 NOTE — PATIENT INSTRUCTIONS
-Please call number on back of insurance card and make an appointment with your PCP  -If you are unable to see your PCP in a timely manner you may proceed to one of our Immediate care or ER locations.   -ER advised with lips swelling tongue swelling, trouble breathing, wheezing, or any other worsening symptoms  -ER advised if you feel as though you need anxiety management

## 2024-04-29 NOTE — PROGRESS NOTES
Telehealth Verbal Consent   I conducted a telehealth visit with Mago Tovar today, 04/29/24, which was completed using two-way, real-time interactive audio and video communication. This has been done in good joselyn to provide continuity of care in the best interest of the provider-patient relationship, due to the COVID -19 public health crisis/national emergency where restrictions of face-to-face office visits are ongoing. Every conscious effort was taken to allow for sufficient and adequate time to complete the visit.  The patient was made aware of the limitations of the telehealth visit, including treatment limitations as no physical exam could be performed.  The patient was advised to call 911 or to go to the ER in case there was an emergency.  The patient was also advised of the potential privacy & security concerns related to the telehealth platform.   The patient was made aware of where to find [facility]'s notice of privacy practices, telehealth consent form and other related consent forms and documents.  which are located on the [facility] website. The patient verbally agreed to telehealth consent form, related consents and the risks discussed.    Lastly, the patient confirmed that they were in [place].   Included in this visit, time may have been spent reviewing labs, medications, radiology tests and decision making. Appropriate medical decision-making and tests are ordered as detailed in the plan of care above.  Coding/billing information is submitted for this visit based on complexity of care and/or time spent for the visit.    CHIEF COMPLAINT:     Chief Complaint   Patient presents with    Itching       HPI:   Mago Tovar is a 39 year old female who presents for a video visit.  Patient reports itching since she was hospitalized at the end of February for achalasia.  Patient reports blood work done at this time and there was no known cause for the itching. Patient is unsure what  blood work was performed but said that they checked her liver.  There is no rash. Patient has tried benadryl, multiple OTC lotions, and was prescribed hydroxyzine last visit.  Since itching has started, patient has not called or contacted her PCP.  Patient has not been seen by derm.  Patient is seeking a medication to stop her itching. Nothing is working very well.  Last video visit note indicated history of itching due to anxiety.  When asked about this, patient reports she has been told in the past her itching could be anxiety related, but she does not know.  Patient has never had allergy testing.  No new soaps, conditioners, shampoos, detergents, lotions, or foods.  No pus,  no blood, no discharge.      Pertinent negatives include no anorexia, congestion, cough, lip swelling, tongue swelling, diarrhea, eye pain, facial edema, fatigue, fever, joint pain, rhinorrhea, swollen throat or tongue, shortness of breath, sore throat or vomiting.    Current Outpatient Medications   Medication Sig Dispense Refill    HYDROcodone-acetaminophen 5-325 MG Oral Tab Take 1 tablet by mouth every 6 (six) hours as needed. (Patient not taking: Reported on 3/16/2024) 20 tablet 0    amLODIPine 10 MG Oral Tab Take 1 tablet (10 mg total) by mouth daily. 90 tablet 0    losartan 100 MG Oral Tab Take 1 tablet (100 mg total) by mouth daily. 90 tablet 0    clonazePAM 0.5 MG Oral Tab Take 1 tablet (0.5 mg total) by mouth 2 (two) times daily as needed for Anxiety. 30 tablet 0    QUEtiapine 25 MG Oral Tab Take 1 tablet (25 mg total) by mouth nightly. 90 tablet 0    sertraline 50 MG Oral Tab Take 1 tablet (50 mg total) by mouth daily. (Patient not taking: Reported on 3/16/2024) 30 tablet 1      Past Medical History:    Anxiety    Esophageal reflux    Essential hypertension      Past Surgical History:   Procedure Laterality Date    Appendectomy      Removal gallbladder      Total abdom hysterectomy           Social History     Socioeconomic History     Marital status: Single   Tobacco Use    Smoking status: Never   Vaping Use    Vaping status: Never Used   Substance and Sexual Activity    Alcohol use: Yes     Comment: social    Drug use: Never     Social Determinants of Health     Food Insecurity: Food Insecurity Present (2/25/2024)    Food Insecurity     Food Insecurity: Sometimes true   Transportation Needs: Unmet Transportation Needs (2/25/2024)    Transportation Needs     Lack of Transportation: Yes   Housing Stability: Low Risk  (2/25/2024)    Housing Stability     Housing Instability: No   Recent Concern: Housing Stability - Medium Risk (2/19/2024)    Housing Stability     Housing Instability: Yes         REVIEW OF SYSTEMS:   GENERAL: feels well otherwise, no fever, no chills.  SKIN: Per HPI. No edema. No ulcerations.  HEENT: Denies rhinorrhea, edema of the lips or swelling of throat.  CARDIOVASCULAR: Denies chest pains or palpitations.  LUNGS: Denies shortness of breath with exertion or rest. No cough or wheezing.  LYMPH: Denies enlargement of the lymph nodes.  NEURO: Denies abnormal sensation, tingling of the skin, or numbness.    EXAM:   General: Alert  Respiratory:   Speaking in full sentences comfortably  Normal work of breathing  No cough during visit  Head: Normocephalic  Nose: No obvious nasal discharge.  Skin: No rash. No facial swelling.     No results found for this or any previous visit (from the past 24 hour(s)).    ASSESSMENT AND PLAN:   Mago Tovar is a 39 year old female who presents with symptoms that are consistent with    ASSESSMENT:   Encounter Diagnosis   Name Primary?    Itching Yes       PLAN: Meds as below.  See patient Instructions.  -This is patient's second on demand video visit for the same chief complaint.  Discussed per our guidelines, this is a follow up visit and she needs to be seen in person or call her PCP. ER advised with any worsening symptoms.   Patient terminated visit once this was advised.    Meds &  Refills for this Visit:  Requested Prescriptions      No prescriptions requested or ordered in this encounter       Risks, benefits, and side effects of medication explained and discussed.    Patient Instructions   -Please call number on back of insurance card and make an appointment with your PCP  -If you are unable to see your PCP in a timely manner you may proceed to one of our Immediate care or ER locations.   -ER advised with lips swelling tongue swelling, trouble breathing, wheezing, or any other worsening symptoms  -ER advised if you feel as though you need anxiety management      The patient indicates understanding of these issues and agrees to the plan.  The patient is asked to return if sx's persist or worsen.    Mago Tovar understands video visit evaluation is not a substitute for face-to-face examination or emergency care. Patient advised to go to ER or call 911 for worsening symptoms or acute distress.

## (undated) DEVICE — BITEBLOCK ENDOSCP 60FR MAXI STRP

## (undated) DEVICE — SLEEVE COMPR M KNEE LEN SGL USE KENDALL SCD

## (undated) DEVICE — Device

## (undated) DEVICE — HYBRIDKNIFE® I-TYPE I-JET OD 2.3MM; L 1.9M: Brand: ERBE

## (undated) DEVICE — KIT VLV 5 PC AIR H2O SUCT BX ENDOGATOR CONN

## (undated) DEVICE — HOOD ST ENDOSCOPIC DH-28GR

## (undated) DEVICE — ELECTRODE EKG W3.5XL4CM ABRAD W1.25XL1.5IN

## (undated) DEVICE — 3M™ RED DOT™ MONITORING ELECTRODE WITH FOAM TAPE AND STICKY GEL, 50/BAG, 20/CASE, 72/PLT 2570: Brand: RED DOT™

## (undated) DEVICE — DEVICE INFL 60ML 15ATM PRSS COOK SPHR

## (undated) DEVICE — REM POLYHESIVE ADULT PATIENT RETURN ELECTRODE: Brand: VALLEYLAB

## (undated) DEVICE — STERIS KITS

## (undated) DEVICE — KIT CUSTOM ENDOPROCEDURE STERIS

## (undated) DEVICE — 10FT COMBINED O2 DELIVERY/CO2 MONITORING. FILTER WITH MICROSTREAM TYPE LUER: Brand: DUAL ADULT NASAL CANNULA

## (undated) DEVICE — NEEDLE INJ 25GA CATH 230CM CHN 2.8MM ACUJECT

## (undated) DEVICE — CANISTER SUCT 1200CC LID BLU HRD ADPT AUTO

## (undated) DEVICE — SINGLE USE ELECTROSURGICAL HEMOSTATIC FORCEPS: Brand: SINGLE USE ELECTROSURGICAL HEMOSTATIC FORCEPS

## (undated) DEVICE — 1200CC GUARDIAN II: Brand: GUARDIAN

## (undated) DEVICE — PUMP CARTRIDGE FOR ERBEJET® 2: Brand: ERBE

## (undated) DEVICE — CANNULA NSL AD W/ FLTR 14FT O2/CO2

## (undated) NOTE — LETTER
[address]  [address]  Authorization for Surgical Operation and Procedure     Date:___________                                                                                                         Time:__________  I hereby authorize Surgeon(s):  Efraín Hart MD, my physician and his/her assistants (if applicable), which may include medical students, residents, and/or fellows, to perform the following surgical operation/ procedure and administer such anesthesia as may be determined necessary by my physician:  Operation/Procedure name (s) Procedure(s):  ESOPHAGOGASTRODUODENOSCOPY (EGD) with food bolus removal on [facility] Aurea Tovar   2.   I recognize that during the surgical operation/procedure, unforeseen conditions may necessitate additional or different procedures than those listed above.  I, therefore, further authorize and request that the above-named surgeon, assistants, or designees perform such procedures as are, in their judgment, necessary and desirable.    3.   My surgeon/physician has discussed prior to my surgery the potential benefits, risks and side effects of this procedure; the likelihood of achieving goals; and potential problems that might occur during recuperation.  They also discussed reasonable alternatives to the procedure, including risks, benefits, and side effects related to the alternatives and risks related to not receiving this procedure.  I have had all my questions answered and I acknowledge that no guarantee has been made as to the result that may be obtained.    4.   Should the need arise during my operation/procedure, which includes change of level of care prior to discharge, I also consent to the administration of blood and/or blood products.  Further, I understand that despite careful testing and screening of blood or blood products by collecting agencies, I may still be subject to ill effects as a result of receiving a blood  transfusion and/or blood products.  The following are some, but not all, of the potential risks that can occur: fever and allergic reactions, hemolytic reactions, transmission of diseases such as Hepatitis, AIDS and Cytomegalovirus (CMV) and fluid overload.  In the event that I wish to have an autologous transfusion of my own blood, or a directed donor transfusion, I will discuss this with my physician.  Check only if Refusing Blood or Blood Products  I understand refusal of blood or blood products as deemed necessary by my physician may have serious consequences to my condition to include possible death. I hereby assume responsibility for my refusal and release the hospital, its personnel, and my physicians from any responsibility for the consequences of my refusal.          o  Refuse      5.   I authorize the use of any specimen, organs, tissues, body parts or foreign objects that may be removed from my body during the operation/procedure for diagnosis, research or teaching purposes and their subsequent disposal by hospital authorities.  I also authorize the release of specimen test results and/or written reports to my treating physician on the hospital medical staff or other referring or consulting physicians involved in my care, at the discretion of the Pathologist or my treating physician.    6.   I consent to the photographing or videotaping of the operations or procedures to be performed, including appropriate portions of my body for medical, scientific, or educational purposes, provided my identity is not revealed by the pictures or by descriptive texts accompanying them.  If the procedure has been photographed/videotaped, the surgeon will obtain the original picture, image, videotape or CD.  The hospital will not be responsible for storage, release or maintenance of the picture, image, tape or CD.    7.   I consent to the presence of a  or observers in the operating room as deemed  necessary by my physician or their designees.    8.   I recognize that in the event my procedure results in extended X-Ray/fluoroscopy time, I may develop a skin reaction.    9. If I have a Do Not Attempt Resuscitation (DNAR) order in place, that status will be suspended while in the operating room, procedural suite, and during the recovery period unless otherwise explicitly stated by me (or a person authorized to consent on my behalf). The surgeon or my attending physician will determine when the applicable recovery period ends for purposes of reinstating the DNAR order.  10. Patients having a sterilization procedure: I understand that if the procedure is successful the results will be permanent and it will therefore be impossible for me to inseminate, conceive, or bear children.  I also understand that the procedure is intended to result in sterility, although the result has not been guaranteed.   11. I acknowledge that my physician has explained sedation/analgesia administration to me including the risk and benefits I consent to the administration of sedation/analgesia as may be necessary or desirable in the judgment of my physician.    I CERTIFY THAT I HAVE READ AND FULLY UNDERSTAND THE ABOVE CONSENT TO OPERATION and/or OTHER PROCEDURE.    _________________________________________  __________________________________  Signature of Patient     Signature of Responsible Person         ___________________________________         Printed Name of Responsible Person           _________________________________                 Relationship to Patient  _________________________________________  ______________________________  Signature of Witness          Date  Time      Patient Name: Mago Tovar     : 3/31/1985                 Printed: 2024     Medical Record #: [id number]                     Page 1 of 2                                    [address]   [id number]    Consent for Anesthesia    IMago agree to be cared for by an anesthesiologist, who is specially trained to monitor me and give me medicine to put me to sleep or keep me comfortable during my procedure    I understand that my anesthesiologist is not an employee or agent of [facility] or [employer]. He or she works for [employer].    As the patient asking for anesthesia services, I agree to:  Allow the anesthesiologist (anesthesia doctor) to give me medicine and do additional procedures as necessary. Some examples are: Starting or using an “IV” to give me medicine, fluids or blood during my procedure, and having a breathing tube placed to help me breathe when I’m asleep (intubation). In the event that my heart stops working properly, I understand that my anesthesiologist will make every effort to sustain my life, unless otherwise directed by [facility] Do Not Resuscitate documents.  Tell my anesthesia doctor before my procedure:  If I am pregnant.  The last time that I ate or drank.  All of the medicines I take (including prescriptions, herbal supplements, and pills I can buy without a prescription (including street drugs/illegal medications). Failure to inform my anesthesiologist about these medicines may increase my risk of anesthetic complications.  If I am allergic to anything or have had a reaction to anesthesia before.  I understand how the anesthesia medicine will help me (benefits).  I understand that with any type of anesthesia medicine there are risks:  The most common risks are: nausea, vomiting, sore throat, muscle soreness, damage to my eyes, mouth, or teeth (from breathing tube placement).  Rare risks include: remembering what happened during my procedure, allergic reactions to medications, injury to my airway, heart, lungs, vision, nerves, or muscles and in extremely rare instances death.  My doctor has explained to me other  choices available to me for my care (alternatives).  Pregnant Patients (“epidural”):  I understand that the risks of having an epidural (medicine given into my back to help control pain during labor), include itching, low blood pressure, difficulty urinating, headache or slowing of the baby’s heart. Very rare risks include infection, bleeding, seizure, irregular heart rhythms and nerve injury.  Regional Anesthesia (“spinal”, “epidural”, & “nerve blocks”):  I understand that rare but potential complications include headache, bleeding, infection, seizure, irregular heart rhythms, and nerve injury.    I can change my mind about having anesthesia services at any time before I get the medicine.    _____________________________________________________________________________  Patient (or Representative) Signature/Relationship to Patient  Date   Time    _____________________________________________________________________________   Name (if used)    Language/Organization   Time    _____________________________________________________________________________  Anesthesiologist Signature     Date   Time  I have discussed the procedure and information above with the patient (or patient’s representative) and answered their questions. The patient or their representative has agreed to have anesthesia services.    _____________________________________________________________________________  Witness        Date   Time  I have verified that the signature is that of the patient or patient’s representative, and that it was signed before the procedure  Patient Name: Mago Tovar     : 3/31/1985                 Printed: 2024     Medical Record #: [id number]                     Page 2 of 2

## (undated) NOTE — LETTER
[address]  [address]  Authorization for Surgical Operation and Procedure     Date:__2/26/2024_________                                                                                                         Time:_______1330___  I hereby authorize Surgeon(s):  Perico Roa MD, my physician and his/her assistants (if applicable), which may include medical students, residents, and/or fellows, to perform the following surgical operation/ procedure and administer such anesthesia as may be determined necessary by my physician:  Operation/Procedure name (s) Procedure(s):  ESOPHAGEAL MANOMETRY on [facility] A[facility]a [facility]ov[facility]   2.   I recognize that during the surgical operation/procedure, unforeseen conditions may necessitate additional or different procedures than those listed above.  I, therefore, further authorize and request that the above-named surgeon, assistants, or designees perform such procedures as are, in their judgment, necessary and desirable.    3.   My surgeon/physician has discussed prior to my surgery the potential benefits, risks and side effects of this procedure; the likelihood of achieving goals; and potential problems that might occur during recuperation.  They also discussed reasonable alternatives to the procedure, including risks, benefits, and side effects related to the alternatives and risks related to not receiving this procedure.  I have had all my questions answered and I acknowledge that no guarantee has been made as to the result that may be obtained.    4.   Should the need arise during my operation/procedure, which includes change of level of care prior to discharge, I also consent to the administration of blood and/or blood products.  Further, I understand that despite careful testing and screening of blood or blood products by collecting agencies, I may still be subject to ill effects as a result of receiving a blood transfusion and/or blood  products.  The following are some, but not all, of the potential risks that can occur: fever and allergic reactions, hemolytic reactions, transmission of diseases such as Hepatitis, AIDS and Cytomegalovirus (CMV) and fluid overload.  In the event that I wish to have an autologous transfusion of my own blood, or a directed donor transfusion, I will discuss this with my physician.  Check only if Refusing Blood or Blood Products  I understand refusal of blood or blood products as deemed necessary by my physician may have serious consequences to my condition to include possible death. I hereby assume responsibility for my refusal and release the hospital, its personnel, and my physicians from any responsibility for the consequences of my refusal.          o  Refuse      5.   I authorize the use of any specimen, organs, tissues, body parts or foreign objects that may be removed from my body during the operation/procedure for diagnosis, research or teaching purposes and their subsequent disposal by hospital authorities.  I also authorize the release of specimen test results and/or written reports to my treating physician on the hospital medical staff or other referring or consulting physicians involved in my care, at the discretion of the Pathologist or my treating physician.    6.   I consent to the photographing or videotaping of the operations or procedures to be performed, including appropriate portions of my body for medical, scientific, or educational purposes, provided my identity is not revealed by the pictures or by descriptive texts accompanying them.  If the procedure has been photographed/videotaped, the surgeon will obtain the original picture, image, videotape or CD.  The hospital will not be responsible for storage, release or maintenance of the picture, image, tape or CD.    7.   I consent to the presence of a  or observers in the operating room as deemed necessary by my physician or  their designees.    8.   I recognize that in the event my procedure results in extended X-Ray/fluoroscopy time, I may develop a skin reaction.    9. If I have a Do Not Attempt Resuscitation (DNAR) order in place, that status will be suspended while in the operating room, procedural suite, and during the recovery period unless otherwise explicitly stated by me (or a person authorized to consent on my behalf). The surgeon or my attending physician will determine when the applicable recovery period ends for purposes of reinstating the DNAR order.  10. Patients having a sterilization procedure: I understand that if the procedure is successful the results will be permanent and it will therefore be impossible for me to inseminate, conceive, or bear children.  I also understand that the procedure is intended to result in sterility, although the result has not been guaranteed.   11. I acknowledge that my physician has explained sedation/analgesia administration to me including the risk and benefits I consent to the administration of sedation/analgesia as may be necessary or desirable in the judgment of my physician.    I CERTIFY THAT I HAVE READ AND FULLY UNDERSTAND THE ABOVE CONSENT TO OPERATION and/or OTHER PROCEDURE.    _________________________________________  __________________________________  Signature of Patient     Signature of Responsible Person         ___________________________________         Printed Name of Responsible Person           _________________________________                 Relationship to Patient  _________________________________________  ______________________________  Signature of Witness          Date  Time      Patient Name: Mago Tovar     : 3/31/1985                 Printed: 2024     Medical Record #: [id number]                     Page [address] of [address] [place] [address]  [id number]    Consent for  Anesthesia    I, Mago Tovar agree to be cared for by an anesthesiologist, who is specially trained to monitor me and give me medicine to put me to sleep or keep me comfortable during my procedure    I understand that my anesthesiologist is not an employee or agent of [facility] or [employer]. He or she works for [employer].    As the patient asking for anesthesia services, I agree to:  Allow the anesthesiologist (anesthesia doctor) to give me medicine and do additional procedures as necessary. Some examples are: Starting or using an “IV” to give me medicine, fluids or blood during my procedure, and having a breathing tube placed to help me breathe when I’m asleep (intubation). In the event that my heart stops working properly, I understand that my anesthesiologist will make every effort to sustain my life, unless otherwise directed by [facility] Do Not Resuscitate documents.  Tell my anesthesia doctor before my procedure:  If I am pregnant.  The last time that I ate or drank.  All of the medicines I take (including prescriptions, herbal supplements, and pills I can buy without a prescription (including street drugs/illegal medications). Failure to inform my anesthesiologist about these medicines may increase my risk of anesthetic complications.  If I am allergic to anything or have had a reaction to anesthesia before.  I understand how the anesthesia medicine will help me (benefits).  I understand that with any type of anesthesia medicine there are risks:  The most common risks are: nausea, vomiting, sore throat, muscle soreness, damage to my eyes, mouth, or teeth (from breathing tube placement).  Rare risks include: remembering what happened during my procedure, allergic reactions to medications, injury to my airway, heart, lungs, vision, nerves, or muscles and in extremely rare instances death.  My doctor has explained to me other choices available to me for  my care (alternatives).  Pregnant Patients (“epidural”):  I understand that the risks of having an epidural (medicine given into my back to help control pain during labor), include itching, low blood pressure, difficulty urinating, headache or slowing of the baby’s heart. Very rare risks include infection, bleeding, seizure, irregular heart rhythms and nerve injury.  Regional Anesthesia (“spinal”, “epidural”, & “nerve blocks”):  I understand that rare but potential complications include headache, bleeding, infection, seizure, irregular heart rhythms, and nerve injury.    I can change my mind about having anesthesia services at any time before I get the medicine.    _____________________________________________________________________________  Patient (or Representative) Signature/Relationship to Patient  Date   Time    _____________________________________________________________________________   Name (if used)    Language/Organization   Time    _____________________________________________________________________________  Anesthesiologist Signature     Date   Time  I have discussed the procedure and information above with the patient (or patient’s representative) and answered their questions. The patient or their representative has agreed to have anesthesia services.    _____________________________________________________________________________  Witness        Date   Time  I have verified that the signature is that of the patient or patient’s representative, and that it was signed before the procedure  Patient Name: Mago Tovar     : 3/31/1985                 Printed: 2024     Medical Record #: [id number]                     Page 2 of 2

## (undated) NOTE — LETTER
[address]  [address]  Authorization for Surgical Operation and Procedure     Date:_____2/25/2024______                                                                                                         Time:____0930______  I hereby authorize Surgeon(s):  Dat Wagner MD, my physician and his/her assistants (if applicable), which may include medical students, residents, and/or fellows, to perform the following surgical operation/ procedure and administer such anesthesia as may be determined necessary by my physician:  Operation/Procedure name (s) Procedure(s):  ESOPHAGOGASTRODUODENOSCOPY (EGD) on [facility]   2.   I recognize that during the surgical operation/procedure, unforeseen conditions may necessitate additional or different procedures than those listed above.  I, therefore, further authorize and request that the above-named surgeon, assistants, or designees perform such procedures as are, in their judgment, necessary and desirable.    3.   My surgeon/physician has discussed prior to my surgery the potential benefits, risks and side effects of this procedure; the likelihood of achieving goals; and potential problems that might occur during recuperation.  They also discussed reasonable alternatives to the procedure, including risks, benefits, and side effects related to the alternatives and risks related to not receiving this procedure.  I have had all my questions answered and I acknowledge that no guarantee has been made as to the result that may be obtained.    4.   Should the need arise during my operation/procedure, which includes change of level of care prior to discharge, I also consent to the administration of blood and/or blood products.  Further, I understand that despite careful testing and screening of blood or blood products by collecting agencies, I may still be subject to ill effects as a result of receiving a blood  transfusion and/or blood products.  The following are some, but not all, of the potential risks that can occur: fever and allergic reactions, hemolytic reactions, transmission of diseases such as Hepatitis, AIDS and Cytomegalovirus (CMV) and fluid overload.  In the event that I wish to have an autologous transfusion of my own blood, or a directed donor transfusion, I will discuss this with my physician.  Check only if Refusing Blood or Blood Products  I understand refusal of blood or blood products as deemed necessary by my physician may have serious consequences to my condition to include possible death. I hereby assume responsibility for my refusal and release the hospital, its personnel, and my physicians from any responsibility for the consequences of my refusal.          o  Refuse      5.   I authorize the use of any specimen, organs, tissues, body parts or foreign objects that may be removed from my body during the operation/procedure for diagnosis, research or teaching purposes and their subsequent disposal by hospital authorities.  I also authorize the release of specimen test results and/or written reports to my treating physician on the hospital medical staff or other referring or consulting physicians involved in my care, at the discretion of the Pathologist or my treating physician.    6.   I consent to the photographing or videotaping of the operations or procedures to be performed, including appropriate portions of my body for medical, scientific, or educational purposes, provided my identity is not revealed by the pictures or by descriptive texts accompanying them.  If the procedure has been photographed/videotaped, the surgeon will obtain the original picture, image, videotape or CD.  The hospital will not be responsible for storage, release or maintenance of the picture, image, tape or CD.    7.   I consent to the presence of a  or observers in the operating room as deemed  necessary by my physician or their designees.    8.   I recognize that in the event my procedure results in extended X-Ray/fluoroscopy time, I may develop a skin reaction.    9. If I have a Do Not Attempt Resuscitation (DNAR) order in place, that status will be suspended while in the operating room, procedural suite, and during the recovery period unless otherwise explicitly stated by me (or a person authorized to consent on my behalf). The surgeon or my attending physician will determine when the applicable recovery period ends for purposes of reinstating the DNAR order.  10. Patients having a sterilization procedure: I understand that if the procedure is successful the results will be permanent and it will therefore be impossible for me to inseminate, conceive, or bear children.  I also understand that the procedure is intended to result in sterility, although the result has not been guaranteed.   11. I acknowledge that my physician has explained sedation/analgesia administration to me including the risk and benefits I consent to the administration of sedation/analgesia as may be necessary or desirable in the judgment of my physician.    I CERTIFY THAT I HAVE READ AND FULLY UNDERSTAND THE ABOVE CONSENT TO OPERATION and/or OTHER PROCEDURE.    _________________________________________  __________________________________  Signature of Patient     Signature of Responsible Person         ___________________________________         Printed Name of Responsible Person           _________________________________                 Relationship to Patient  _________________________________________  ______________________________  Signature of Witness          Date  Time      Patient Name: Mago Tovar     : 3/31/1985                 Printed: 2024     Medical Record #: [id number]                     Page 1 of 2                                    [address]   [address]    Consent for Anesthesia    IMago agree to be cared for by an anesthesiologist, who is specially trained to monitor me and give me medicine to put me to sleep or keep me comfortable during my procedure    I understand that my anesthesiologist is not an employee or agent of [facility] or [employer]. He or she works for [employer].    As the patient asking for anesthesia services, I agree to:  Allow the anesthesiologist (anesthesia doctor) to give me medicine and do additional procedures as necessary. Some examples are: Starting or using an “IV” to give me medicine, fluids or blood during my procedure, and having a breathing tube placed to help me breathe when I’m asleep (intubation). In the event that my heart stops working properly, I understand that my anesthesiologist will make every effort to sustain my life, unless otherwise directed by [facility] Do Not Resuscitate documents.  Tell my anesthesia doctor before my procedure:  If I am pregnant.  The last time that I ate or drank.  All of the medicines I take (including prescriptions, herbal supplements, and pills I can buy without a prescription (including street drugs/illegal medications). Failure to inform my anesthesiologist about these medicines may increase my risk of anesthetic complications.  If I am allergic to anything or have had a reaction to anesthesia before.  I understand how the anesthesia medicine will help me (benefits).  I understand that with any type of anesthesia medicine there are risks:  The most common risks are: nausea, vomiting, sore throat, muscle soreness, damage to my eyes, mouth, or teeth (from breathing tube placement).  Rare risks include: remembering what happened during my procedure, allergic reactions to medications, injury to my airway, heart, lungs, vision, nerves, or muscles and in extremely rare instances death.  My doctor has explained to me other  choices available to me for my care (alternatives).  Pregnant Patients (“epidural”):  I understand that the risks of having an epidural (medicine given into my back to help control pain during labor), include itching, low blood pressure, difficulty urinating, headache or slowing of the baby’s heart. Very rare risks include infection, bleeding, seizure, irregular heart rhythms and nerve injury.  Regional Anesthesia (“spinal”, “epidural”, & “nerve blocks”):  I understand that rare but potential complications include headache, bleeding, infection, seizure, irregular heart rhythms, and nerve injury.    I can change my mind about having anesthesia services at any time before I get the medicine.    _____________________________________________________________________________  Patient (or Representative) Signature/Relationship to Patient  Date   Time    _____________________________________________________________________________   Name (if used)    Language/Organization   Time    _____________________________________________________________________________  Anesthesiologist Signature     Date   Time  I have discussed the procedure and information above with the patient (or patient’s representative) and answered their questions. The patient or their representative has agreed to have anesthesia services.    _____________________________________________________________________________  Witness        Date   Time  I have verified that the signature is that of the patient or patient’s representative, and that it was signed before the procedure  Patient Name: Mago Tovar     : 3/31/1985                 Printed: 2024     Medical Record #: [id number]                     Page 2 of 2

## (undated) NOTE — LETTER
Date & Time: 6/21/2023, 10:40 AM  Patient: Solo Garcia  Encounter Provider(s):    Renan Marques MD       To Whom It May Concern:    Solo Garcia was seen and treated in our department on 6/21/2023. She may return to work on 6/23/23. .    If you have any questions or concerns, please do not hesitate to call.        _____________________________  Physician/APC Signature

## (undated) NOTE — LETTER
[address]  [address]  Authorization for Surgical Operation and Procedure     Date:___________                                                                                                         Time:__________  I hereby authorize Surgeon(s):  Efraín Hart MD, my physician and his/her assistants (if applicable), which may include medical students, residents, and/or fellows, to perform the following surgical operation/ procedure and administer such anesthesia as may be determined necessary by my physician:  Operation/Procedure name (s) Procedure(s):  [facility]   2.   I recognize that during the surgical operation/procedure, unforeseen conditions may necessitate additional or different procedures than those listed above.  I, therefore, further authorize and request that the above-named surgeon, assistants, or designees perform such procedures as are, in their judgment, necessary and desirable.    3.   My surgeon/physician has discussed prior to my surgery the potential benefits, risks and side effects of this procedure; the likelihood of achieving goals; and potential problems that might occur during recuperation.  They also discussed reasonable alternatives to the procedure, including risks, benefits, and side effects related to the alternatives and risks related to not receiving this procedure.  I have had all my questions answered and I acknowledge that no guarantee has been made as to the result that may be obtained.    4.   Should the need arise during my operation/procedure, which includes change of level of care prior to discharge, I also consent to the administration of blood and/or blood products.  Further, I understand that despite careful testing and screening of blood or blood products by collecting agencies, I may still be subject to ill effects as a result of receiving a blood transfusion and/or blood products.  The  following are some, but not all, of the potential risks that can occur: fever and allergic reactions, hemolytic reactions, transmission of diseases such as Hepatitis, AIDS and Cytomegalovirus (CMV) and fluid overload.  In the event that I wish to have an autologous transfusion of my own blood, or a directed donor transfusion, I will discuss this with my physician.  Check only if Refusing Blood or Blood Products  I understand refusal of blood or blood products as deemed necessary by my physician may have serious consequences to my condition to include possible death. I hereby assume responsibility for my refusal and release the hospital, its personnel, and my physicians from any responsibility for the consequences of my refusal.          o  Refuse      5.   I authorize the use of any specimen, organs, tissues, body parts or foreign objects that may be removed from my body during the operation/procedure for diagnosis, research or teaching purposes and their subsequent disposal by hospital authorities.  I also authorize the release of specimen test results and/or written reports to my treating physician on the hospital medical staff or other referring or consulting physicians involved in my care, at the discretion of the Pathologist or my treating physician.    6.   I consent to the photographing or videotaping of the operations or procedures to be performed, including appropriate portions of my body for medical, scientific, or educational purposes, provided my identity is not revealed by the pictures or by descriptive texts accompanying them.  If the procedure has been photographed/videotaped, the surgeon will obtain the original picture, image, videotape or CD.  The hospital will not be responsible for storage, release or maintenance of the picture, image, tape or CD.    7.   I consent to the presence of a  or observers in the operating room as deemed necessary by my physician or their designees.     8.   I recognize that in the event my procedure results in extended X-Ray/fluoroscopy time, I may develop a skin reaction.    9. If I have a Do Not Attempt Resuscitation (DNAR) order in place, that status will be suspended while in the operating room, procedural suite, and during the recovery period unless otherwise explicitly stated by me (or a person authorized to consent on my behalf). The surgeon or my attending physician will determine when the applicable recovery period ends for purposes of reinstating the DNAR order.  10. Patients having a sterilization procedure: I understand that if the procedure is successful the results will be permanent and it will therefore be impossible for me to inseminate, conceive, or bear children.  I also understand that the procedure is intended to result in sterility, although the result has not been guaranteed.   11. I acknowledge that my physician has explained sedation/analgesia administration to me including the risk and benefits I consent to the administration of sedation/analgesia as may be necessary or desirable in the judgment of my physician.    I CERTIFY THAT I HAVE READ AND FULLY UNDERSTAND THE ABOVE CONSENT TO OPERATION and/or OTHER PROCEDURE.    _________________________________________  __________________________________  Signature of Patient     Signature of Responsible Person         ___________________________________         Printed Name of Responsible Person           _________________________________                 Relationship to Patient  _________________________________________  ______________________________  Signature of Witness          Date  Time      Patient Name: Mago Tovar     : 3/31/1985                 Printed: 2024     Medical Record #: [id number]                     Page [address] of [address] [place] [address] [place]  [address]    Consent for Anesthesia    Mago REDDY  Aurea Tovar agree to be cared for by an anesthesiologist, who is specially trained to monitor me and give me medicine to put me to sleep or keep me comfortable during my procedure    I understand that my anesthesiologist is not an employee or agent of [facility] [employer]. He or she works for [employer].    As the patient asking for anesthesia services, I agree to:  Allow the anesthesiologist (anesthesia doctor) to give me medicine and do additional procedures as necessary. Some examples are: Starting or using an “IV” to give me medicine, fluids or blood during my procedure, and having a breathing tube placed to help me breathe when I’m asleep (intubation). In the event that my heart stops working properly, I understand that my anesthesiologist will make every effort to sustain my life, unless otherwise directed by [facility] Do Not Resuscitate documents.  Tell my anesthesia doctor before my procedure:  If I am pregnant.  The last time that I ate or drank.  All of the medicines I take (including prescriptions, herbal supplements, and pills I can buy without a prescription (including street drugs/illegal medications). Failure to inform my anesthesiologist about these medicines may increase my risk of anesthetic complications.  If I am allergic to anything or have had a reaction to anesthesia before.  I understand how the anesthesia medicine will help me (benefits).  I understand that with any type of anesthesia medicine there are risks:  The most common risks are: nausea, vomiting, sore throat, muscle soreness, damage to my eyes, mouth, or teeth (from breathing tube placement).  Rare risks include: remembering what happened during my procedure, allergic reactions to medications, injury to my airway, heart, lungs, vision, nerves, or muscles and in extremely rare instances death.  My doctor has explained to me other choices available to me for my care  (alternatives).  Pregnant Patients (“epidural”):  I understand that the risks of having an epidural (medicine given into my back to help control pain during labor), include itching, low blood pressure, difficulty urinating, headache or slowing of the baby’s heart. Very rare risks include infection, bleeding, seizure, irregular heart rhythms and nerve injury.  Regional Anesthesia (“spinal”, “epidural”, & “nerve blocks”):  I understand that rare but potential complications include headache, bleeding, infection, seizure, irregular heart rhythms, and nerve injury.    I can change my mind about having anesthesia services at any time before I get the medicine.    _____________________________________________________________________________  Patient (or Representative) Signature/Relationship to Patient  Date   Time    _____________________________________________________________________________   Name (if used)    Language/Organization   Time    _____________________________________________________________________________  Anesthesiologist Signature     Date   Time  I have discussed the procedure and information above with the patient (or patient’s representative) and answered their questions. The patient or their representative has agreed to have anesthesia services.    _____________________________________________________________________________  Witness        Date   Time  I have verified that the signature is that of the patient or patient’s representative, and that it was signed before the procedure  Patient Name: Mago Tovar     : 3/31/1985                 Printed: 2024     Medical Record #: [id number]                     Page 2 of 2

## (undated) NOTE — LETTER
[address]  [address]  Authorization for Surgical Operation and Procedure     Date:___________                                                                                                         Time:__________  I hereby authorize Surgeon(s):  Perico Roa MD, my physician and his/her assistants (if applicable), which may include medical students, residents, and/or fellows, to perform the following surgical operation/ procedure and administer such anesthesia as may be determined necessary by my physician:  Operation/Procedure name (s) Procedure(s):  [facility]   2.   I recognize that during the surgical operation/procedure, unforeseen conditions may necessitate additional or different procedures than those listed above.  I, therefore, further authorize and request that the above-named surgeon, assistants, or designees perform such procedures as are, in their judgment, necessary and desirable.    3.   My surgeon/physician has discussed prior to my surgery the potential benefits, risks and side effects of this procedure; the likelihood of achieving goals; and potential problems that might occur during recuperation.  They also discussed reasonable alternatives to the procedure, including risks, benefits, and side effects related to the alternatives and risks related to not receiving this procedure.  I have had all my questions answered and I acknowledge that no guarantee has been made as to the result that may be obtained.    4.   Should the need arise during my operation/procedure, which includes change of level of care prior to discharge, I also consent to the administration of blood and/or blood products.  Further, I understand that despite careful testing and screening of blood or blood products by collecting agencies, I may still be subject to ill effects as a result of receiving a blood transfusion and/or blood products.   The following are some, but not all, of the potential risks that can occur: fever and allergic reactions, hemolytic reactions, transmission of diseases such as Hepatitis, AIDS and Cytomegalovirus (CMV) and fluid overload.  In the event that I wish to have an autologous transfusion of my own blood, or a directed donor transfusion, I will discuss this with my physician.  Check only if Refusing Blood or Blood Products  I understand refusal of blood or blood products as deemed necessary by my physician may have serious consequences to my condition to include possible death. I hereby assume responsibility for my refusal and release the hospital, its personnel, and my physicians from any responsibility for the consequences of my refusal.          o  Refuse      5.   I authorize the use of any specimen, organs, tissues, body parts or foreign objects that may be removed from my body during the operation/procedure for diagnosis, research or teaching purposes and their subsequent disposal by hospital authorities.  I also authorize the release of specimen test results and/or written reports to my treating physician on the hospital medical staff or other referring or consulting physicians involved in my care, at the discretion of the Pathologist or my treating physician.    6.   I consent to the photographing or videotaping of the operations or procedures to be performed, including appropriate portions of my body for medical, scientific, or educational purposes, provided my identity is not revealed by the pictures or by descriptive texts accompanying them.  If the procedure has been photographed/videotaped, the surgeon will obtain the original picture, image, videotape or CD.  The hospital will not be responsible for storage, release or maintenance of the picture, image, tape or CD.    7.   I consent to the presence of a  or observers in the operating room as deemed necessary by my physician or their  designees.    8.   I recognize that in the event my procedure results in extended X-Ray/fluoroscopy time, I may develop a skin reaction.    9. If I have a Do Not Attempt Resuscitation (DNAR) order in place, that status will be suspended while in the operating room, procedural suite, and during the recovery period unless otherwise explicitly stated by me (or a person authorized to consent on my behalf). The surgeon or my attending physician will determine when the applicable recovery period ends for purposes of reinstating the DNAR order.  10. Patients having a sterilization procedure: I understand that if the procedure is successful the results will be permanent and it will therefore be impossible for me to inseminate, conceive, or bear children.  I also understand that the procedure is intended to result in sterility, although the result has not been guaranteed.   11. I acknowledge that my physician has explained sedation/analgesia administration to me including the risk and benefits I consent to the administration of sedation/analgesia as may be necessary or desirable in the judgment of my physician.    I CERTIFY THAT I HAVE READ AND FULLY UNDERSTAND THE ABOVE CONSENT TO OPERATION and/or OTHER PROCEDURE.    _________________________________________  __________________________________  Signature of Patient     Signature of Responsible Person         ___________________________________         Printed Name of Responsible Person           _________________________________                 Relationship to Patient  _________________________________________  ______________________________  Signature of Witness          Date  Time      Patient Name: Mago Tovar     : 3/31/1985                 Printed: 2024     Medical Record #: [id number]                     Page 1 of [address] [place] [address]  [id number]    Consent for  Anesthesia    I, Mago Tovar agree to be cared for by an anesthesiologist, who is specially trained to monitor me and give me medicine to put me to sleep or keep me comfortable during my procedure    I understand that my anesthesiologist is not an employee or agent of [facility] or [employer]. He or she works for [employer].    As the patient asking for anesthesia services, I agree to:  Allow the anesthesiologist (anesthesia doctor) to give me medicine and do additional procedures as necessary. Some examples are: Starting or using an “IV” to give me medicine, fluids or blood during my procedure, and having a breathing tube placed to help me breathe when I’m asleep (intubation). In the event that my heart stops working properly, I understand that my anesthesiologist will make every effort to sustain my life, unless otherwise directed by [facility] Do Not Resuscitate documents.  Tell my anesthesia doctor before my procedure:  If I am pregnant.  The last time that I ate or drank.  All of the medicines I take (including prescriptions, herbal supplements, and pills I can buy without a prescription (including street drugs/illegal medications). Failure to inform my anesthesiologist about these medicines may increase my risk of anesthetic complications.  If I am allergic to anything or have had a reaction to anesthesia before.  I understand how the anesthesia medicine will help me (benefits).  I understand that with any type of anesthesia medicine there are risks:  The most common risks are: nausea, vomiting, sore throat, muscle soreness, damage to my eyes, mouth, or teeth (from breathing tube placement).  Rare risks include: remembering what happened during my procedure, allergic reactions to medications, injury to my airway, heart, lungs, vision, nerves, or muscles and in extremely rare instances death.  My doctor has explained to me other choices available to me for  my care (alternatives).  Pregnant Patients (“epidural”):  I understand that the risks of having an epidural (medicine given into my back to help control pain during labor), include itching, low blood pressure, difficulty urinating, headache or slowing of the baby’s heart. Very rare risks include infection, bleeding, seizure, irregular heart rhythms and nerve injury.  Regional Anesthesia (“spinal”, “epidural”, & “nerve blocks”):  I understand that rare but potential complications include headache, bleeding, infection, seizure, irregular heart rhythms, and nerve injury.    I can change my mind about having anesthesia services at any time before I get the medicine.    _____________________________________________________________________________  Patient (or Representative) Signature/Relationship to Patient  Date   Time    _____________________________________________________________________________   Name (if used)    Language/Organization   Time    _____________________________________________________________________________  Anesthesiologist Signature     Date   Time  I have discussed the procedure and information above with the patient (or patient’s representative) and answered their questions. The patient or their representative has agreed to have anesthesia services.    _____________________________________________________________________________  Witness        Date   Time  I have verified that the signature is that of the patient or patient’s representative, and that it was signed before the procedure  Patient Name: Mago Tovar     : 3/31/1985                 Printed: 2024     Medical Record #: [id number]                     Page 2 of 2

## (undated) NOTE — LETTER
[address]  [address]  Consent for Procedure/Sedation  Date: ***         Time: ***    {[facility] ivs consent:[id number]}

## (undated) NOTE — LETTER
[address]  [address]  Authorization for Surgical Operation and Procedure     Date:___________                                                                                                         Time:__________  I hereby authorize Surgeon(s):  Perico Roa MD, my physician and his/her assistants (if applicable), which may include medical students, residents, and/or fellows, to perform the following surgical operation/ procedure and administer such anesthesia as may be determined necessary by my physician:  Operation/Procedure name (s) Procedure(s):  Gastric PERORAL ENDOSCOPIC MYOTOMY on Mago Tovar   2.   I recognize that during the surgical operation/procedure, unforeseen conditions may necessitate additional or different procedures than those listed above.  I, therefore, further authorize and request that the above-named surgeon, assistants, or designees perform such procedures as are, in their judgment, necessary and desirable.    3.   My surgeon/physician has discussed prior to my surgery the potential benefits, risks and side effects of this procedure; the likelihood of achieving goals; and potential problems that might occur during recuperation.  They also discussed reasonable alternatives to the procedure, including risks, benefits, and side effects related to the alternatives and risks related to not receiving this procedure.  I have had all my questions answered and I acknowledge that no guarantee has been made as to the result that may be obtained.    4.   Should the need arise during my operation/procedure, which includes change of level of care prior to discharge, I also consent to the administration of blood and/or blood products.  Further, I understand that despite careful testing and screening of blood or blood products by collecting agencies, I may still be subject to ill effects as a result of receiving a blood transfusion and/or blood  products.  The following are some, but not all, of the potential risks that can occur: fever and allergic reactions, hemolytic reactions, transmission of diseases such as Hepatitis, AIDS and Cytomegalovirus (CMV) and fluid overload.  In the event that I wish to have an autologous transfusion of my own blood, or a directed donor transfusion, I will discuss this with my physician.  Check only if Refusing Blood or Blood Products  I understand refusal of blood or blood products as deemed necessary by my physician may have serious consequences to my condition to include possible death. I hereby assume responsibility for my refusal and release the hospital, its personnel, and my physicians from any responsibility for the consequences of my refusal.          o  Refuse      5.   I authorize the use of any specimen, organs, tissues, body parts or foreign objects that may be removed from my body during the operation/procedure for diagnosis, research or teaching purposes and their subsequent disposal by hospital authorities.  I also authorize the release of specimen test results and/or written reports to my treating physician on the hospital medical staff or other referring or consulting physicians involved in my care, at the discretion of the Pathologist or my treating physician.    6.   I consent to the photographing or videotaping of the operations or procedures to be performed, including appropriate portions of my body for medical, scientific, or educational purposes, provided my identity is not revealed by the pictures or by descriptive texts accompanying them.  If the procedure has been photographed/videotaped, the surgeon will obtain the original picture, image, videotape or CD.  The hospital will not be responsible for storage, release or maintenance of the picture, image, tape or CD.    7.   I consent to the presence of a  or observers in the operating room as deemed necessary by my physician or  their designees.    8.   I recognize that in the event my procedure results in extended X-Ray/fluoroscopy time, I may develop a skin reaction.    9. If I have a Do Not Attempt Resuscitation (DNAR) order in place, that status will be suspended while in the operating room, procedural suite, and during the recovery period unless otherwise explicitly stated by me (or a person authorized to consent on my behalf). The surgeon or my attending physician will determine when the applicable recovery period ends for purposes of reinstating the DNAR order.  10. Patients having a sterilization procedure: I understand that if the procedure is successful the results will be permanent and it will therefore be impossible for me to inseminate, conceive, or bear children.  I also understand that the procedure is intended to result in sterility, although the result has not been guaranteed.   11. I acknowledge that my physician has explained sedation/analgesia administration to me including the risk and benefits I consent to the administration of sedation/analgesia as may be necessary or desirable in the judgment of my physician.    I CERTIFY THAT I HAVE READ AND FULLY UNDERSTAND THE ABOVE CONSENT TO OPERATION and/or OTHER PROCEDURE.    _________________________________________  __________________________________  Signature of Patient     Signature of Responsible Person         ___________________________________         Printed Name of Responsible Person           _________________________________                 Relationship to Patient  _________________________________________  ______________________________  Signature of Witness          Date  Time      Patient Name: Mago Tovar     : 3/31/1985                 Printed: 2024     Medical Record #: [id number]                     Page 1 of [address] [place] [address]  [address]    Consent for  Anesthesia    I, Mago Tovar agree to be cared for by an anesthesiologist, who is specially trained to monitor me and give me medicine to put me to sleep or keep me comfortable during my procedure    I understand that my anesthesiologist is not an employee or agent of [facility] or [employer]. He or she works for [employer].    As the patient asking for anesthesia services, I agree to:  Allow the anesthesiologist (anesthesia doctor) to give me medicine and do additional procedures as necessary. Some examples are: Starting or using an “IV” to give me medicine, fluids or blood during my procedure, and having a breathing tube placed to help me breathe when I’m asleep (intubation). In the event that my heart stops working properly, I understand that my anesthesiologist will make every effort to sustain my life, unless otherwise directed by [facility] Do Not Resuscitate documents.  Tell my anesthesia doctor before my procedure:  If I am pregnant.  The last time that I ate or drank.  All of the medicines I take (including prescriptions, herbal supplements, and pills I can buy without a prescription (including street drugs/illegal medications). Failure to inform my anesthesiologist about these medicines may increase my risk of anesthetic complications.  If I am allergic to anything or have had a reaction to anesthesia before.  I understand how the anesthesia medicine will help me (benefits).  I understand that with any type of anesthesia medicine there are risks:  The most common risks are: nausea, vomiting, sore throat, muscle soreness, damage to my eyes, mouth, or teeth (from breathing tube placement).  Rare risks include: remembering what happened during my procedure, allergic reactions to medications, injury to my airway, heart, lungs, vision, nerves, or muscles and in extremely rare instances death.  My doctor has explained to me other choices available to me for  my care (alternatives).  Pregnant Patients (“epidural”):  I understand that the risks of having an epidural (medicine given into my back to help control pain during labor), include itching, low blood pressure, difficulty urinating, headache or slowing of the baby’s heart. Very rare risks include infection, bleeding, seizure, irregular heart rhythms and nerve injury.  Regional Anesthesia (“spinal”, “epidural”, & “nerve blocks”):  I understand that rare but potential complications include headache, bleeding, infection, seizure, irregular heart rhythms, and nerve injury.    I can change my mind about having anesthesia services at any time before I get the medicine.    _____________________________________________________________________________  Patient (or Representative) Signature/Relationship to Patient  Date   Time    _____________________________________________________________________________   Name (if used)    Language/Organization   Time    _____________________________________________________________________________  Anesthesiologist Signature     Date   Time  I have discussed the procedure and information above with the patient (or patient’s representative) and answered their questions. The patient or their representative has agreed to have anesthesia services.    _____________________________________________________________________________  Witness        Date   Time  I have verified that the signature is that of the patient or patient’s representative, and that it was signed before the procedure  Patient Name: Mago Tovar     : 3/31/1985                 Printed: 2024     Medical Record #: [id number]                     Page 2 of 2

## (undated) NOTE — LETTER
[address]  [address]  Authorization for Surgical Operation and Procedure     Date:__02/27/2024_________                                                                                                         Time:__1204____  I hereby authorize Surgeon(s):  Perico Roa MD, my physician and his/her assistants (if applicable), which may include medical students, residents, and/or fellows, to perform the following surgical operation/ procedure and administer such anesthesia as may be determined necessary by my physician:  Operation/Procedure name (s) Esophagogastroduodenoscopy with gastric peroral endoscopic myotomy under general anesthesia care on [facility]   2.   I recognize that during the surgical operation/procedure, unforeseen conditions may necessitate additional or different procedures than those listed above.  I, therefore, further authorize and request that the above-named surgeon, assistants, or designees perform such procedures as are, in their judgment, necessary and desirable.    3.   My surgeon/physician has discussed prior to my surgery the potential benefits, risks and side effects of this procedure; the likelihood of achieving goals; and potential problems that might occur during recuperation.  They also discussed reasonable alternatives to the procedure, including risks, benefits, and side effects related to the alternatives and risks related to not receiving this procedure.  I have had all my questions answered and I acknowledge that no guarantee has been made as to the result that may be obtained.    4.   Should the need arise during my operation/procedure, which includes change of level of care prior to discharge, I also consent to the administration of blood and/or blood products.  Further, I understand that despite careful testing and screening of blood or blood products by collecting agencies, I may still be subject to ill effects  as a result of receiving a blood transfusion and/or blood products.  The following are some, but not all, of the potential risks that can occur: fever and allergic reactions, hemolytic reactions, transmission of diseases such as Hepatitis, AIDS and Cytomegalovirus (CMV) and fluid overload.  In the event that I wish to have an autologous transfusion of my own blood, or a directed donor transfusion, I will discuss this with my physician.  Check only if Refusing Blood or Blood Products  I understand refusal of blood or blood products as deemed necessary by my physician may have serious consequences to my condition to include possible death. I hereby assume responsibility for my refusal and release the hospital, its personnel, and my physicians from any responsibility for the consequences of my refusal.          o  Refuse      5.   I authorize the use of any specimen, organs, tissues, body parts or foreign objects that may be removed from my body during the operation/procedure for diagnosis, research or teaching purposes and their subsequent disposal by hospital authorities.  I also authorize the release of specimen test results and/or written reports to my treating physician on the hospital medical staff or other referring or consulting physicians involved in my care, at the discretion of the Pathologist or my treating physician.    6.   I consent to the photographing or videotaping of the operations or procedures to be performed, including appropriate portions of my body for medical, scientific, or educational purposes, provided my identity is not revealed by the pictures or by descriptive texts accompanying them.  If the procedure has been photographed/videotaped, the surgeon will obtain the original picture, image, videotape or CD.  The hospital will not be responsible for storage, release or maintenance of the picture, image, tape or CD.    7.   I consent to the presence of a  or observers in the  operating room as deemed necessary by my physician or their designees.    8.   I recognize that in the event my procedure results in extended X-Ray/fluoroscopy time, I may develop a skin reaction.    9. If I have a Do Not Attempt Resuscitation (DNAR) order in place, that status will be suspended while in the operating room, procedural suite, and during the recovery period unless otherwise explicitly stated by me (or a person authorized to consent on my behalf). The surgeon or my attending physician will determine when the applicable recovery period ends for purposes of reinstating the DNAR order.  10. Patients having a sterilization procedure: I understand that if the procedure is successful the results will be permanent and it will therefore be impossible for me to inseminate, conceive, or bear children.  I also understand that the procedure is intended to result in sterility, although the result has not been guaranteed.   11. I acknowledge that my physician has explained sedation/analgesia administration to me including the risk and benefits I consent to the administration of sedation/analgesia as may be necessary or desirable in the judgment of my physician.    I CERTIFY THAT I HAVE READ AND FULLY UNDERSTAND THE ABOVE CONSENT TO OPERATION and/or OTHER PROCEDURE.    _________________________________________  __________________________________  Signature of Patient     Signature of Responsible Person         ___________________________________         Printed Name of Responsible Person           _________________________________                 Relationship to Patient  _________________________________________  ______________________________  Signature of Witness          Date  Time      Patient Name: Mago Tovar     : 3/31/1985                 Printed: 2024     Medical Record #: [id number]                     Page 1 of 2                                    [address]  [place]  [address]    Consent for Anesthesia    IMago agree to be cared for by an anesthesiologist, who is specially trained to monitor me and give me medicine to put me to sleep or keep me comfortable during my procedure    I understand that my anesthesiologist is not an employee or agent of [facility] or [employer]. He or she works for [employer].    As the patient asking for anesthesia services, I agree to:  Allow the anesthesiologist (anesthesia doctor) to give me medicine and do additional procedures as necessary. Some examples are: Starting or using an “IV” to give me medicine, fluids or blood during my procedure, and having a breathing tube placed to help me breathe when I’m asleep (intubation). In the event that my heart stops working properly, I understand that my anesthesiologist will make every effort to sustain my life, unless otherwise directed by [facility] Do Not Resuscitate documents.  Tell my anesthesia doctor before my procedure:  If I am pregnant.  The last time that I ate or drank.  All of the medicines I take (including prescriptions, herbal supplements, and pills I can buy without a prescription (including street drugs/illegal medications). Failure to inform my anesthesiologist about these medicines may increase my risk of anesthetic complications.  If I am allergic to anything or have had a reaction to anesthesia before.  I understand how the anesthesia medicine will help me (benefits).  I understand that with any type of anesthesia medicine there are risks:  The most common risks are: nausea, vomiting, sore throat, muscle soreness, damage to my eyes, mouth, or teeth (from breathing tube placement).  Rare risks include: remembering what happened during my procedure, allergic reactions to medications, injury to my airway, heart, lungs, vision, nerves, or muscles and in extremely rare instances death.  My doctor  has explained to me other choices available to me for my care (alternatives).  Pregnant Patients (“epidural”):  I understand that the risks of having an epidural (medicine given into my back to help control pain during labor), include itching, low blood pressure, difficulty urinating, headache or slowing of the baby’s heart. Very rare risks include infection, bleeding, seizure, irregular heart rhythms and nerve injury.  Regional Anesthesia (“spinal”, “epidural”, & “nerve blocks”):  I understand that rare but potential complications include headache, bleeding, infection, seizure, irregular heart rhythms, and nerve injury.    I can change my mind about having anesthesia services at any time before I get the medicine.    _____________________________________________________________________________  Patient (or Representative) Signature/Relationship to Patient  Date   Time    _____________________________________________________________________________   Name (if used)    Language/Organization   Time    _____________________________________________________________________________  Anesthesiologist Signature     Date   Time  I have discussed the procedure and information above with the patient (or patient’s representative) and answered their questions. The patient or their representative has agreed to have anesthesia services.    _____________________________________________________________________________  Witness        Date   Time  I have verified that the signature is that of the patient or patient’s representative, and that it was signed before the procedure  Patient Name: Mago Tovar     : 3/31/1985                 Printed: 2024     Medical Record #: [id number]                     Page 2 of 2

## (undated) NOTE — LETTER
[address]  [address]  Authorization for Surgical Operation and Procedure     Date:___________                                                                                                         Time:__________  I hereby authorize Surgeon(s):  Perico Roa MD, my physician and his/her assistants (if applicable), which may include medical students, residents, and/or fellows, to perform the following surgical operation/ procedure and administer such anesthesia as may be determined necessary by my physician:  Operation/Procedure name (s) Esophagogastroduodenoscopy with possible dilation, possible biopsy, possible control of bleeding under monitored anesthesia care   on [facility]   2.   I recognize that during the surgical operation/procedure, unforeseen conditions may necessitate additional or different procedures than those listed above.  I, therefore, further authorize and request that the above-named surgeon, assistants, or designees perform such procedures as are, in their judgment, necessary and desirable.    3.   My surgeon/physician has discussed prior to my surgery the potential benefits, risks and side effects of this procedure; the likelihood of achieving goals; and potential problems that might occur during recuperation.  They also discussed reasonable alternatives to the procedure, including risks, benefits, and side effects related to the alternatives and risks related to not receiving this procedure.  I have had all my questions answered and I acknowledge that no guarantee has been made as to the result that may be obtained.    4.   Should the need arise during my operation/procedure, which includes change of level of care prior to discharge, I also consent to the administration of blood and/or blood products.  Further, I understand that despite careful testing and screening of blood or blood products by collecting agencies, I may still be  subject to ill effects as a result of receiving a blood transfusion and/or blood products.  The following are some, but not all, of the potential risks that can occur: fever and allergic reactions, hemolytic reactions, transmission of diseases such as Hepatitis, AIDS and Cytomegalovirus (CMV) and fluid overload.  In the event that I wish to have an autologous transfusion of my own blood, or a directed donor transfusion, I will discuss this with my physician.  Check only if Refusing Blood or Blood Products  I understand refusal of blood or blood products as deemed necessary by my physician may have serious consequences to my condition to include possible death. I hereby assume responsibility for my refusal and release the hospital, its personnel, and my physicians from any responsibility for the consequences of my refusal.          o  Refuse      5.   I authorize the use of any specimen, organs, tissues, body parts or foreign objects that may be removed from my body during the operation/procedure for diagnosis, research or teaching purposes and their subsequent disposal by hospital authorities.  I also authorize the release of specimen test results and/or written reports to my treating physician on the hospital medical staff or other referring or consulting physicians involved in my care, at the discretion of the Pathologist or my treating physician.    6.   I consent to the photographing or videotaping of the operations or procedures to be performed, including appropriate portions of my body for medical, scientific, or educational purposes, provided my identity is not revealed by the pictures or by descriptive texts accompanying them.  If the procedure has been photographed/videotaped, the surgeon will obtain the original picture, image, videotape or CD.  The hospital will not be responsible for storage, release or maintenance of the picture, image, tape or CD.    7.   I consent to the presence of a product  specialist or observers in the operating room as deemed necessary by my physician or their designees.    8.   I recognize that in the event my procedure results in extended X-Ray/fluoroscopy time, I may develop a skin reaction.    9. If I have a Do Not Attempt Resuscitation (DNAR) order in place, that status will be suspended while in the operating room, procedural suite, and during the recovery period unless otherwise explicitly stated by me (or a person authorized to consent on my behalf). The surgeon or my attending physician will determine when the applicable recovery period ends for purposes of reinstating the DNAR order.  10. Patients having a sterilization procedure: I understand that if the procedure is successful the results will be permanent and it will therefore be impossible for me to inseminate, conceive, or bear children.  I also understand that the procedure is intended to result in sterility, although the result has not been guaranteed.   11. I acknowledge that my physician has explained sedation/analgesia administration to me including the risk and benefits I consent to the administration of sedation/analgesia as may be necessary or desirable in the judgment of my physician.    I CERTIFY THAT I HAVE READ AND FULLY UNDERSTAND THE ABOVE CONSENT TO OPERATION and/or OTHER PROCEDURE.    _________________________________________  __________________________________  Signature of Patient     Signature of Responsible Person         ___________________________________         Printed Name of Responsible Person           _________________________________                 Relationship to Patient  _________________________________________  ______________________________  Signature of Witness          Date  Time      Patient Name: Mago Tovar     : 3/31/1985                 Printed: 2024     Medical Record #: [id number]                     Page 1 of 2                                     [address]  [address]    Consent for Anesthesia    IMago agree to be cared for by an anesthesiologist, who is specially trained to monitor me and give me medicine to put me to sleep or keep me comfortable during my procedure    I understand that my anesthesiologist is not an employee or agent of [facility] or [employer]. He or she works for [employer].    As the patient asking for anesthesia services, I agree to:  Allow the anesthesiologist (anesthesia doctor) to give me medicine and do additional procedures as necessary. Some examples are: Starting or using an “IV” to give me medicine, fluids or blood during my procedure, and having a breathing tube placed to help me breathe when I’m asleep (intubation). In the event that my heart stops working properly, I understand that my anesthesiologist will make every effort to sustain my life, unless otherwise directed by [facility] Do Not Resuscitate documents.  Tell my anesthesia doctor before my procedure:  If I am pregnant.  The last time that I ate or drank.  All of the medicines I take (including prescriptions, herbal supplements, and pills I can buy without a prescription (including street drugs/illegal medications). Failure to inform my anesthesiologist about these medicines may increase my risk of anesthetic complications.  If I am allergic to anything or have had a reaction to anesthesia before.  I understand how the anesthesia medicine will help me (benefits).  I understand that with any type of anesthesia medicine there are risks:  The most common risks are: nausea, vomiting, sore throat, muscle soreness, damage to my eyes, mouth, or teeth (from breathing tube placement).  Rare risks include: remembering what happened during my procedure, allergic reactions to medications, injury to my airway, heart, lungs, vision, nerves, or muscles and in extremely rare  instances death.  My doctor has explained to me other choices available to me for my care (alternatives).  Pregnant Patients (“epidural”):  I understand that the risks of having an epidural (medicine given into my back to help control pain during labor), include itching, low blood pressure, difficulty urinating, headache or slowing of the baby’s heart. Very rare risks include infection, bleeding, seizure, irregular heart rhythms and nerve injury.  Regional Anesthesia (“spinal”, “epidural”, & “nerve blocks”):  I understand that rare but potential complications include headache, bleeding, infection, seizure, irregular heart rhythms, and nerve injury.    I can change my mind about having anesthesia services at any time before I get the medicine.    _____________________________________________________________________________  Patient (or Representative) Signature/Relationship to Patient  Date   Time    _____________________________________________________________________________   Name (if used)    Language/Organization   Time    _____________________________________________________________________________  Anesthesiologist Signature     Date   Time  I have discussed the procedure and information above with the patient (or patient’s representative) and answered their questions. The patient or their representative has agreed to have anesthesia services.    _____________________________________________________________________________  Witness        Date   Time  I have verified that the signature is that of the patient or patient’s representative, and that it was signed before the procedure  Patient Name: Mago Tovar     : 3/31/1985                 Printed: 2024     Medical Record #: [id number]                     Page 2 of 2